# Patient Record
Sex: MALE | Race: WHITE | NOT HISPANIC OR LATINO | Employment: OTHER | URBAN - METROPOLITAN AREA
[De-identification: names, ages, dates, MRNs, and addresses within clinical notes are randomized per-mention and may not be internally consistent; named-entity substitution may affect disease eponyms.]

---

## 2017-11-08 ENCOUNTER — OFFICE VISIT (OUTPATIENT)
Dept: LAB | Facility: HOSPITAL | Age: 67
End: 2017-11-08
Attending: INTERNAL MEDICINE
Payer: MEDICARE

## 2017-11-08 ENCOUNTER — TRANSCRIBE ORDERS (OUTPATIENT)
Dept: LAB | Facility: HOSPITAL | Age: 67
End: 2017-11-08

## 2017-11-08 DIAGNOSIS — I49.40 CARDIAC ARRHYTHMIA DUE TO PREMATURE DEPOLARIZATION, UNSPECIFIED TYPE: ICD-10-CM

## 2017-11-08 DIAGNOSIS — I49.40 CARDIAC ARRHYTHMIA DUE TO PREMATURE DEPOLARIZATION, UNSPECIFIED TYPE: Primary | ICD-10-CM

## 2017-11-08 LAB
ATRIAL RATE: 75 BPM
P AXIS: 48 DEGREES
PR INTERVAL: 180 MS
QRS AXIS: -67 DEGREES
QRSD INTERVAL: 90 MS
QT INTERVAL: 362 MS
QTC INTERVAL: 404 MS
T WAVE AXIS: 65 DEGREES
VENTRICULAR RATE: 75 BPM

## 2017-11-08 PROCEDURE — 93005 ELECTROCARDIOGRAM TRACING: CPT

## 2017-11-15 LAB — HBA1C MFR BLD HPLC: 5.6 %

## 2018-07-24 ENCOUNTER — TELEPHONE (OUTPATIENT)
Dept: FAMILY MEDICINE CLINIC | Facility: CLINIC | Age: 68
End: 2018-07-24

## 2018-07-24 ENCOUNTER — OFFICE VISIT (OUTPATIENT)
Dept: FAMILY MEDICINE CLINIC | Facility: CLINIC | Age: 68
End: 2018-07-24
Payer: COMMERCIAL

## 2018-07-24 VITALS
TEMPERATURE: 99.7 F | BODY MASS INDEX: 30.06 KG/M2 | WEIGHT: 210 LBS | SYSTOLIC BLOOD PRESSURE: 140 MMHG | HEIGHT: 70 IN | HEART RATE: 64 BPM | RESPIRATION RATE: 16 BRPM | DIASTOLIC BLOOD PRESSURE: 90 MMHG

## 2018-07-24 DIAGNOSIS — R03.0 BORDERLINE HYPERTENSION: ICD-10-CM

## 2018-07-24 DIAGNOSIS — G43.709 CHRONIC MIGRAINE WITHOUT AURA WITHOUT STATUS MIGRAINOSUS, NOT INTRACTABLE: Primary | ICD-10-CM

## 2018-07-24 DIAGNOSIS — K21.9 GASTROESOPHAGEAL REFLUX DISEASE WITHOUT ESOPHAGITIS: ICD-10-CM

## 2018-07-24 DIAGNOSIS — Z13.6 SCREENING FOR CARDIOVASCULAR CONDITION: ICD-10-CM

## 2018-07-24 PROCEDURE — 99204 OFFICE O/P NEW MOD 45 MIN: CPT | Performed by: FAMILY MEDICINE

## 2018-07-24 PROCEDURE — 3008F BODY MASS INDEX DOCD: CPT | Performed by: FAMILY MEDICINE

## 2018-07-24 RX ORDER — RANITIDINE 150 MG/1
150 TABLET ORAL 2 TIMES DAILY
COMMUNITY
End: 2021-06-14 | Stop reason: ALTCHOICE

## 2018-07-24 RX ORDER — RANITIDINE 150 MG/1
150 CAPSULE ORAL 2 TIMES DAILY
Refills: 0 | Status: CANCELLED | OUTPATIENT
Start: 2018-07-24

## 2018-07-24 RX ORDER — CALCIUM CARBONATE 300MG(750)
1 TABLET,CHEWABLE ORAL DAILY
Qty: 30 TABLET | Refills: 5
Start: 2018-07-24

## 2018-07-24 RX ORDER — SUMATRIPTAN 50 MG/1
50 TABLET, FILM COATED ORAL ONCE AS NEEDED
COMMUNITY
End: 2018-07-24 | Stop reason: SDUPTHER

## 2018-07-24 RX ORDER — COVID-19 ANTIGEN TEST
KIT MISCELLANEOUS
Refills: 0 | Status: CANCELLED | OUTPATIENT
Start: 2018-07-24

## 2018-07-24 RX ORDER — SUMATRIPTAN 50 MG/1
50 TABLET, FILM COATED ORAL ONCE AS NEEDED
Qty: 9 TABLET | Refills: 0 | Status: CANCELLED | OUTPATIENT
Start: 2018-07-24

## 2018-07-24 RX ORDER — COVID-19 ANTIGEN TEST
KIT MISCELLANEOUS
COMMUNITY

## 2018-07-24 RX ORDER — SUMATRIPTAN 50 MG/1
50 TABLET, FILM COATED ORAL ONCE AS NEEDED
Qty: 9 TABLET | Refills: 1 | Status: SHIPPED | OUTPATIENT
Start: 2018-07-24 | End: 2018-08-20 | Stop reason: SDUPTHER

## 2018-07-24 NOTE — PROGRESS NOTES
Chief Complaint   Patient presents with    New Patient     New patient here to establish care and discuss migraines  Assessment/Plan:    Chronic migraine without aura or status migrainosus  Patient has chronic frequent migraine headaches  Discuss side effects of Imitrex in detail  Explained to patient that I do not recommend frequent use of medication due to increased risk of cardiovascular events  Recommended to start Magnesium 400 mg 1 tablet daily for migraine prophylaxis  Encouraged patient to stay well hydrated, avoid migraine triggers  Schedule appointment with neurologist to discussed treatment options for migraine headaches, Botox injections  Gastroesophageal reflux disease without esophagitis  Symptoms are stable  Take Ranitidine 150 mg one tablet twice daily pRN  Avoid fried, fatty food  Decrease caffeine and alcohol consumption  Borderline hypertension  Blood pressure is elevated in the office today  Recommended to follow a low-sodium diet  Encouraged regular exercise, weight reduction  Will recheck blood pressure in 4 weeks  Check labs  Schedule follow-up visit in 4 weeks, will review test results  Diagnoses and all orders for this visit:    Chronic migraine without aura without status migrainosus, not intractable  -     CBC and differential; Future  -     Comprehensive metabolic panel; Future  -     TSH, 3rd generation with T4 reflex; Future  -     SUMAtriptan (IMITREX) 50 mg tablet; Take 1 tablet (50 mg total) by mouth once as needed for migraine  -     Ambulatory referral to Neurology; Future  -     Magnesium 400 MG TABS; Take 1 tablet (400 mg total) by mouth daily    Borderline hypertension  -     CBC and differential; Future  -     Comprehensive metabolic panel; Future    Screening for cardiovascular condition  -     Lipid panel;  Future    Gastroesophageal reflux disease without esophagitis    Other orders  -     Cancel: SUMAtriptan (IMITREX) 50 mg tablet; Take 1 tablet (50 mg total) by mouth once as needed for migraine for up to 1 dose  -     Cancel: ranitidine (ZANTAC) 150 MG capsule; Take 1 capsule (150 mg total) by mouth 2 (two) times a day  -     Cancel: Naproxen Sodium (ALEVE) 220 MG CAPS; Take by mouth  -     Discontinue: SUMAtriptan (IMITREX) 50 mg tablet; Take 50 mg by mouth once as needed for migraine  -     ranitidine (ZANTAC) 150 mg tablet; Take 150 mg by mouth 2 (two) times a day  -     Naproxen Sodium (ALEVE) 220 MG CAPS; Take by mouth          Subjective:      Patient ID: Hammad Cobos is a 74 y o  male  HPI     New patient presents to [facility] medical care  Patient has H/o migraine headaches for many years  He reports having headaches 3-4 times per week  The only medication which helps to relieve the headache is Imitrex 50 mg  Patient states that he tried in the past Amitriptyline, Topamax with no relief in symptoms  He has not been seen by neurologist previously  Headaches are not associated with nausea or vomiting  C/o light sensitivity, wears sunglasses  Patient has H/o GERD  He takes Ranitidine 150 mg 1 tablet twice daily PRN  Patient is a former smoker  He drinks whiskey 2-3 drinks daily  No illicit drug use  No recent blood test     The following portions of the patient's history were reviewed and updated as appropriate: allergies, past family history, past medical history, past social history, past surgical history and problem list     Review of Systems   Constitutional: Negative for activity change, appetite change, chills, fatigue and fever  HENT: Negative for congestion, ear pain, hearing loss, nosebleeds, sore throat, tinnitus and trouble swallowing  Eyes: Negative for pain, discharge, redness, itching and visual disturbance  Respiratory: Negative for cough, chest tightness, shortness of breath and wheezing  Cardiovascular: Negative for chest pain, palpitations and leg swelling     Gastrointestinal: Negative for abdominal pain, blood in stool, constipation, diarrhea, nausea and vomiting  Denies heartburn   Genitourinary: Negative for difficulty urinating, dysuria and hematuria  Nocturia   Musculoskeletal: Negative for arthralgias, gait problem, joint swelling and myalgias  Skin: Negative for rash and wound  Neurological: Positive for headaches  Negative for dizziness, seizures and syncope  Hematological: Negative  Psychiatric/Behavioral: Negative  Objective:      /90 (BP Location: Left arm, Patient Position: Sitting, Cuff Size: Large)   Pulse 64   Temp 99 7 °F (37 6 °C) (Tympanic)   Resp 16   Ht 5' 10 25" (1 784 m)   Wt 95 3 kg (210 lb)   BMI 29 92 kg/m²          Physical Exam   Constitutional: He appears well-developed and well-nourished  Obese   HENT:   Head: Normocephalic and atraumatic  Right Ear: External ear normal    Left Ear: External ear normal    Mouth/Throat: Oropharynx is clear and moist    Eyes: Conjunctivae are normal  Pupils are equal, round, and reactive to light  Neck: Normal range of motion  Neck supple  No JVD present  Cardiovascular: Normal rate, regular rhythm and normal heart sounds  No murmur heard  No carotid bruits BL  No abdominal bruits  No BL LE edema   Pulmonary/Chest: Effort normal and breath sounds normal  He has no wheezes  He has no rales  Abdominal: Soft  Bowel sounds are normal  There is no tenderness  Musculoskeletal: Normal range of motion  He exhibits no edema, tenderness or deformity  Neurological: He is alert  No cranial nerve deficit  Coordination normal    Skin: Skin is warm and dry  No rash noted  Psychiatric: He has a normal mood and affect  Nursing note and vitals reviewed

## 2018-07-24 NOTE — ASSESSMENT & PLAN NOTE
Patient has chronic frequent migraine headaches  Discuss side effects of Imitrex in detail  Explained to patient that I do not recommend frequent use of medication due to increased risk of cardiovascular events  Recommended to start Magnesium 400 mg 1 tablet daily for migraine prophylaxis  Encouraged patient to stay well hydrated, avoid migraine triggers  Schedule appointment with neurologist to discussed treatment options for migraine headaches, Botox injections

## 2018-07-24 NOTE — ASSESSMENT & PLAN NOTE
Symptoms are stable  Take Ranitidine 150 mg one tablet twice daily pRN  Avoid fried, fatty food  Decrease caffeine and alcohol consumption

## 2018-07-24 NOTE — ASSESSMENT & PLAN NOTE
Blood pressure is elevated in the office today  Recommended to follow a low-sodium diet  Encouraged regular exercise, weight reduction  Will recheck blood pressure in 4 weeks

## 2018-08-20 DIAGNOSIS — G43.709 CHRONIC MIGRAINE WITHOUT AURA WITHOUT STATUS MIGRAINOSUS, NOT INTRACTABLE: ICD-10-CM

## 2018-08-20 RX ORDER — SUMATRIPTAN 50 MG/1
50 TABLET, FILM COATED ORAL ONCE AS NEEDED
Qty: 9 TABLET | Refills: 0 | Status: SHIPPED | OUTPATIENT
Start: 2018-08-20 | End: 2018-09-10 | Stop reason: SDUPTHER

## 2018-08-24 ENCOUNTER — APPOINTMENT (OUTPATIENT)
Dept: LAB | Facility: HOSPITAL | Age: 68
End: 2018-08-24
Payer: COMMERCIAL

## 2018-08-24 DIAGNOSIS — R03.0 BORDERLINE HYPERTENSION: ICD-10-CM

## 2018-08-24 DIAGNOSIS — Z13.6 SCREENING FOR CARDIOVASCULAR CONDITION: ICD-10-CM

## 2018-08-24 DIAGNOSIS — G43.709 CHRONIC MIGRAINE WITHOUT AURA WITHOUT STATUS MIGRAINOSUS, NOT INTRACTABLE: ICD-10-CM

## 2018-08-24 LAB
ALBUMIN SERPL BCP-MCNC: 3.5 G/DL (ref 3.5–5)
ALP SERPL-CCNC: 66 U/L (ref 46–116)
ALT SERPL W P-5'-P-CCNC: 51 U/L (ref 12–78)
ANION GAP SERPL CALCULATED.3IONS-SCNC: 5 MMOL/L (ref 4–13)
AST SERPL W P-5'-P-CCNC: 48 U/L (ref 5–45)
BASOPHILS # BLD AUTO: 0.08 THOUSANDS/ΜL (ref 0–0.1)
BASOPHILS NFR BLD AUTO: 1 % (ref 0–1)
BILIRUB SERPL-MCNC: 1.1 MG/DL (ref 0.2–1)
BUN SERPL-MCNC: 18 MG/DL (ref 5–25)
CALCIUM SERPL-MCNC: 8.3 MG/DL (ref 8.3–10.1)
CHLORIDE SERPL-SCNC: 105 MMOL/L (ref 100–108)
CHOLEST SERPL-MCNC: 185 MG/DL (ref 50–200)
CO2 SERPL-SCNC: 26 MMOL/L (ref 21–32)
CREAT SERPL-MCNC: 1.2 MG/DL (ref 0.6–1.3)
EOSINOPHIL # BLD AUTO: 0.06 THOUSAND/ΜL (ref 0–0.61)
EOSINOPHIL NFR BLD AUTO: 1 % (ref 0–6)
ERYTHROCYTE [DISTWIDTH] IN BLOOD BY AUTOMATED COUNT: 12.4 % (ref 11.6–15.1)
GFR SERPL CREATININE-BSD FRML MDRD: 63 ML/MIN/1.73SQ M
GLUCOSE P FAST SERPL-MCNC: 94 MG/DL (ref 65–99)
HCT VFR BLD AUTO: 45.4 % (ref 36.5–49.3)
HDLC SERPL-MCNC: 51 MG/DL (ref 40–60)
HGB BLD-MCNC: 15.9 G/DL (ref 12–17)
IMM GRANULOCYTES # BLD AUTO: 0.01 THOUSAND/UL (ref 0–0.2)
IMM GRANULOCYTES NFR BLD AUTO: 0 % (ref 0–2)
LDLC SERPL CALC-MCNC: 104 MG/DL (ref 0–100)
LYMPHOCYTES # BLD AUTO: 2.21 THOUSANDS/ΜL (ref 0.6–4.47)
LYMPHOCYTES NFR BLD AUTO: 35 % (ref 14–44)
MCH RBC QN AUTO: 32.3 PG (ref 26.8–34.3)
MCHC RBC AUTO-ENTMCNC: 35 G/DL (ref 31.4–37.4)
MCV RBC AUTO: 92 FL (ref 82–98)
MONOCYTES # BLD AUTO: 0.41 THOUSAND/ΜL (ref 0.17–1.22)
MONOCYTES NFR BLD AUTO: 6 % (ref 4–12)
NEUTROPHILS # BLD AUTO: 3.6 THOUSANDS/ΜL (ref 1.85–7.62)
NEUTS SEG NFR BLD AUTO: 57 % (ref 43–75)
NONHDLC SERPL-MCNC: 134 MG/DL
NRBC BLD AUTO-RTO: 0 /100 WBCS
PLATELET # BLD AUTO: 166 THOUSANDS/UL (ref 149–390)
PMV BLD AUTO: 10.4 FL (ref 8.9–12.7)
POTASSIUM SERPL-SCNC: 4.2 MMOL/L (ref 3.5–5.3)
PROT SERPL-MCNC: 6.9 G/DL (ref 6.4–8.2)
RBC # BLD AUTO: 4.92 MILLION/UL (ref 3.88–5.62)
SODIUM SERPL-SCNC: 136 MMOL/L (ref 136–145)
TRIGL SERPL-MCNC: 148 MG/DL
TSH SERPL DL<=0.05 MIU/L-ACNC: 2.68 UIU/ML (ref 0.36–3.74)
WBC # BLD AUTO: 6.37 THOUSAND/UL (ref 4.31–10.16)

## 2018-08-24 PROCEDURE — 80053 COMPREHEN METABOLIC PANEL: CPT

## 2018-08-24 PROCEDURE — 85025 COMPLETE CBC W/AUTO DIFF WBC: CPT

## 2018-08-24 PROCEDURE — 84443 ASSAY THYROID STIM HORMONE: CPT

## 2018-08-24 PROCEDURE — 36415 COLL VENOUS BLD VENIPUNCTURE: CPT

## 2018-08-24 PROCEDURE — 80061 LIPID PANEL: CPT

## 2018-08-30 ENCOUNTER — OFFICE VISIT (OUTPATIENT)
Dept: FAMILY MEDICINE CLINIC | Facility: CLINIC | Age: 68
End: 2018-08-30
Payer: COMMERCIAL

## 2018-08-30 VITALS
BODY MASS INDEX: 30.17 KG/M2 | WEIGHT: 211.8 LBS | SYSTOLIC BLOOD PRESSURE: 154 MMHG | HEART RATE: 94 BPM | RESPIRATION RATE: 16 BRPM | TEMPERATURE: 99.3 F | DIASTOLIC BLOOD PRESSURE: 94 MMHG

## 2018-08-30 DIAGNOSIS — I10 ESSENTIAL HYPERTENSION: Primary | ICD-10-CM

## 2018-08-30 DIAGNOSIS — R00.0 SINUS TACHYCARDIA: ICD-10-CM

## 2018-08-30 DIAGNOSIS — G43.709 CHRONIC MIGRAINE WITHOUT AURA WITHOUT STATUS MIGRAINOSUS, NOT INTRACTABLE: ICD-10-CM

## 2018-08-30 DIAGNOSIS — Z12.11 SCREENING FOR COLON CANCER: ICD-10-CM

## 2018-08-30 PROBLEM — R74.01 ELEVATED AST (SGOT): Status: ACTIVE | Noted: 2018-08-30

## 2018-08-30 PROCEDURE — 99214 OFFICE O/P EST MOD 30 MIN: CPT | Performed by: FAMILY MEDICINE

## 2018-08-30 PROCEDURE — 93000 ELECTROCARDIOGRAM COMPLETE: CPT | Performed by: FAMILY MEDICINE

## 2018-08-30 RX ORDER — METOPROLOL SUCCINATE 25 MG/1
25 TABLET, EXTENDED RELEASE ORAL DAILY
Qty: 30 TABLET | Refills: 5 | Status: SHIPPED | OUTPATIENT
Start: 2018-08-30 | End: 2018-09-26 | Stop reason: ALTCHOICE

## 2018-08-30 NOTE — ASSESSMENT & PLAN NOTE
Start Magnesium 400 mg one tablet daily for migraine prophylaxis  Recommended to stay well hydrated, avoid migraine triggers  Schedule appointment with neurology for chronic migraine headaches as discussed at the last visit

## 2018-08-30 NOTE — ASSESSMENT & PLAN NOTE
Patient has elevated blood pressure, tachycardia  EKG showed sinus rhythm, HR 98 beats per minute, QRS abnormality in leads 3, rule out old infarct  No acute ST- T wave changes  No significant EKG changes from previous EKG from 11/17  Will start Metoprolol ER 25 mg 1 tablet daily  Recommended to follow a low-sodium diet  Encouraged regular exercise

## 2018-08-30 NOTE — PROGRESS NOTES
Chief Complaint   Patient presents with    Follow-up     Follow up to review labs  Health Maintenance   Topic Date Due    Depression Screening PHQ  1950    Medicare Annual Wellness Visit (AWV)  1950    CRC Screening: Colonoscopy  1950    DTaP,Tdap,and Td Vaccines (1 - Tdap) 09/08/1973    Fall Risk  09/08/2017    Pneumococcal PPSV23/PCV13 65+ Years / Low and Medium Risk (1 of 2 - PCV13) 09/08/2017    INFLUENZA VACCINE  09/01/2018     Assessment/Plan:    Essential hypertension  Patient has elevated blood pressure, tachycardia  EKG showed sinus rhythm, HR 98 beats per minute, QRS abnormality in leads 3, rule out old infarct  No acute ST- T wave changes  No significant EKG changes from previous EKG from 11/17  Will start Metoprolol ER 25 mg 1 tablet daily  Recommended to follow a low-sodium diet  Encouraged regular exercise  Chronic migraine without aura or status migrainosus  Start Magnesium 400 mg one tablet daily for migraine prophylaxis  Recommended to stay well hydrated, avoid migraine triggers  Schedule appointment with neurology for chronic migraine headaches as discussed at the last visit  Sinus tachycardia  Recommended to decrease caffeine intake  Will start beta-blocker  Elevated AST (SGOT)  AST level is mildly elevated  Patient was advised to decrease alcohol consumption  Schedule follow-up visit in 2 weeks for HTN, sinus tachycardia  Diagnoses and all orders for this visit:    Essential hypertension  -     POCT ECG  -     metoprolol succinate (TOPROL-XL) 25 mg 24 hr tablet; Take 1 tablet (25 mg total) by mouth daily    Sinus tachycardia  -     POCT ECG  -     metoprolol succinate (TOPROL-XL) 25 mg 24 hr tablet; Take 1 tablet (25 mg total) by mouth daily    Chronic migraine without aura without status migrainosus, not intractable    Screening for colon cancer  -     Ambulatory referral to Gastroenterology;  Future          Subjective: Patient ID: Sophie Damon is a 74 y o  male  HPI     Patient presents for 4 week follow-up office visit,  review test results  He was seen for initial evaluation on July 24, 2018  His blood pressure was elevated at 140/90  Patient states that he was taking blood pressure medication in the past, but he does not remember the name of the medicine  Denies chest pain, shortness of breath, dizziness, heart palpitations  Patient had blood test done on 8/24/18  Cholesterol 185, triglycerides 148, HDL 51, , CBC with dif - within normal limits  Potassium 4 2, creatinine 1 20, AST 48, ALT 51,   TSH 2 680  Patient has chronic frequent migraine headaches  He reports no headache for the past week  Usually he gets migraine headache 3-4 times per week  He takes Imitrex  PRN  Patient tried Amitriptyline, Topamax in the past with no improvement in symptoms  He states that he had adverse reaction to Amitriptyline  Patient did not start taking Magnesium 400 mg daily as discussed at the last visit  Patient did not schedule appointment with neurology yet  Former smoker  Patient drinks whiskey 2-3 drinks daily  Denies using illicit drugs  The following portions of the patient's history were reviewed and updated as appropriate: allergies, current medications, past medical history, past social history, past surgical history and problem list     Review of Systems   Constitutional: Negative for activity change, chills, fatigue and fever  HENT: Negative  Eyes: Negative for pain, discharge, redness, itching and visual disturbance  Respiratory: Negative for cough, chest tightness, shortness of breath and wheezing  Cardiovascular: Negative for chest pain, palpitations and leg swelling  Gastrointestinal: Negative for abdominal pain, blood in stool, constipation, diarrhea, nausea and vomiting     Genitourinary: Negative for difficulty urinating, dysuria, flank pain, frequency and hematuria  Musculoskeletal: Negative for arthralgias, gait problem, joint swelling and myalgias  Skin: Negative for rash and wound  Neurological: Negative for dizziness, seizures, syncope and headaches  Hematological: Negative  Psychiatric/Behavioral: Negative  Objective:      /94 (BP Location: Left arm, Patient Position: Sitting, Cuff Size: Large)   Pulse 94   Temp 99 3 °F (37 4 °C) (Tympanic)   Resp 16   Wt 96 1 kg (211 lb 12 8 oz)   BMI 30 17 kg/m²          Physical Exam   Constitutional: He appears well-developed and well-nourished  Mildly obese   HENT:   Head: Normocephalic and atraumatic  Right Ear: External ear normal    Left Ear: External ear normal    Mouth/Throat: Oropharynx is clear and moist    Eyes: Conjunctivae are normal  Pupils are equal, round, and reactive to light  Cardiovascular: Normal rate and regular rhythm  Exam reveals no gallop  No murmur heard  Sinus tachycardia  No carotid bruits BL  No BL LE edema   Pulmonary/Chest: Effort normal and breath sounds normal  He has no wheezes  He has no rales  Abdominal: Soft  Bowel sounds are normal  There is no rebound  Musculoskeletal: Normal range of motion  He exhibits no edema, tenderness or deformity  Patient ambulates with a cane   Skin: Skin is warm and dry  Psychiatric: He has a normal mood and affect  Nursing note and vitals reviewed

## 2018-09-10 DIAGNOSIS — G43.709 CHRONIC MIGRAINE WITHOUT AURA WITHOUT STATUS MIGRAINOSUS, NOT INTRACTABLE: ICD-10-CM

## 2018-09-10 RX ORDER — SUMATRIPTAN 50 MG/1
50 TABLET, FILM COATED ORAL ONCE AS NEEDED
Qty: 9 TABLET | Refills: 0 | Status: SHIPPED | OUTPATIENT
Start: 2018-09-10 | End: 2018-09-26 | Stop reason: SDUPTHER

## 2018-09-26 ENCOUNTER — OFFICE VISIT (OUTPATIENT)
Dept: FAMILY MEDICINE CLINIC | Facility: CLINIC | Age: 68
End: 2018-09-26
Payer: COMMERCIAL

## 2018-09-26 ENCOUNTER — TELEPHONE (OUTPATIENT)
Dept: FAMILY MEDICINE CLINIC | Facility: CLINIC | Age: 68
End: 2018-09-26

## 2018-09-26 VITALS
HEART RATE: 84 BPM | OXYGEN SATURATION: 95 % | BODY MASS INDEX: 29.78 KG/M2 | SYSTOLIC BLOOD PRESSURE: 144 MMHG | TEMPERATURE: 99.2 F | RESPIRATION RATE: 16 BRPM | DIASTOLIC BLOOD PRESSURE: 90 MMHG | WEIGHT: 209 LBS

## 2018-09-26 DIAGNOSIS — G43.709 CHRONIC MIGRAINE WITHOUT AURA WITHOUT STATUS MIGRAINOSUS, NOT INTRACTABLE: ICD-10-CM

## 2018-09-26 DIAGNOSIS — I10 ESSENTIAL HYPERTENSION: Primary | ICD-10-CM

## 2018-09-26 DIAGNOSIS — R00.0 SINUS TACHYCARDIA: ICD-10-CM

## 2018-09-26 DIAGNOSIS — Z12.11 SCREENING FOR COLON CANCER: ICD-10-CM

## 2018-09-26 DIAGNOSIS — R94.31 ABNORMAL EKG: ICD-10-CM

## 2018-09-26 DIAGNOSIS — K21.9 GASTROESOPHAGEAL REFLUX DISEASE WITHOUT ESOPHAGITIS: ICD-10-CM

## 2018-09-26 PROCEDURE — 99214 OFFICE O/P EST MOD 30 MIN: CPT | Performed by: FAMILY MEDICINE

## 2018-09-26 RX ORDER — SUMATRIPTAN 50 MG/1
50 TABLET, FILM COATED ORAL ONCE AS NEEDED
Qty: 9 TABLET | Refills: 1 | Status: SHIPPED | OUTPATIENT
Start: 2018-09-26 | End: 2018-10-15 | Stop reason: SDUPTHER

## 2018-09-26 NOTE — PROGRESS NOTES
Chief Complaint   Patient presents with    Follow-up     2 week follow up for Hypertension  Health Maintenance   Topic Date Due    Depression Screening PHQ  1950    Medicare Annual Wellness Visit (AWV)  1950    CRC Screening: Colonoscopy  1950    DTaP,Tdap,and Td Vaccines (1 - Tdap) 09/08/1973    Fall Risk  09/08/2017    Pneumococcal PPSV23/PCV13 65+ Years / Low and Medium Risk (1 of 2 - PCV13) 09/08/2017    INFLUENZA VACCINE  09/01/2018     Assessment/Plan:    HTN / Sinus tachycardia - patient stop taking Metoprolol ER 25 mg due to side effects, felt tired and weak  Blood pressure remains elevated  His heart rate is irregular  Patient denies heart palpitations, dizziness, chest pain  Recommended to follow low-sodium diet, decrease caffeine and alcohol intake  Stay well hydrated  Check BP at [employer] and keep BP log  Recommended cardiology evaluation  Chronic migraine without aura or status migrainosus  Explained to patient that he should not take Imitrex every 2-3 days due to increased risk of cardiovascular events  Will refill prescription only for 9 pills per month  Recommended neurology consultation  Appointment scheduled with neurology on 11/2/18  Gastroesophageal reflux disease without esophagitis  Symptoms are stable  Take Ranitidine 150 mg 1 tablet twice daily PRN  HM: patient declined flu shot today  Recommended to schedule colonoscopy  I have spent 25 minutes with Patient  today in which greater than 50% of this time was spent in counseling/coordination of care regarding Risks and benefits of tx options, Intructions for management, Importance of tx compliance and Risk factor reductions  Schedule follow-up visit in 4 months  Diagnoses and all orders for this visit:    Essential hypertension  -     Ambulatory referral to Cardiology; Future    Sinus tachycardia  -     Ambulatory referral to Cardiology;  Future    Chronic migraine without aura without status migrainosus, not intractable  -     Ambulatory referral to Neurology; Future  -     SUMAtriptan (IMITREX) 50 mg tablet; Take 1 tablet (50 mg total) by mouth once as needed for migraine    Screening for colon cancer  -     Ambulatory referral to Gastroenterology; Future    Abnormal EKG  -     Ambulatory referral to Cardiology; Future    Gastroesophageal reflux disease without esophagitis          Subjective:      Patient ID: Abbey Capone is a 79 y o  male  HPI     Patient presents for 3 week follow-up visit for HTN, sinus tachycardia  He was started on Metoprolol ER 25 mg daily at the last visit on 8/30/18  Patient states that he took medication for 1 week and then stopped due to feeling very tired and weak  Patient denies chest pain, shortness of breath, heart palpitations  EKG done last month showed sinus rhythm, 98 bpm,  QRS abnormality in leads 3, rule out old infarct  No acute ST- T wave changes  Patient has chronic frequent migraine headaches  He takes Imitrex 50 mg every 2-3 days  Patient states that he needs 18 pills per month  He tried Topamax, Amitriptyline in the past with no improvement in symptoms  Patient started taking Magnesium 400 mg daily  Patient did not schedule appointment with neurology as discussed at the previous visit  GERD - symptoms are stable  Patient takes Zantac 150 mg twice daily PRN for heartburn  Denies abdominal pain  Patient is a former smoker, stopped smoking 14 years ago  He drinks whiskey 2-3 drinks daily  Denies using illicit drugs  The following portions of the patient's history were reviewed and updated as appropriate: allergies, past family history, past medical history, past social history, past surgical history and problem list     Review of Systems   Constitutional: Positive for fatigue  Negative for activity change, appetite change, chills and fever  Diaphoresis: mild     HENT: Negative for congestion, hearing loss, sinus pressure, sore throat, tinnitus and trouble swallowing  Eyes: Positive for photophobia  Negative for pain, discharge, redness, itching and visual disturbance  Respiratory: Negative for cough, chest tightness, shortness of breath and wheezing  Cardiovascular: Negative for chest pain, palpitations and leg swelling  Gastrointestinal: Negative  Musculoskeletal: Positive for gait problem (ambulates with a cane)  Negative for arthralgias and joint swelling  Skin: Negative for rash and wound  Neurological: Positive for headaches  Negative for dizziness, seizures and syncope  Light-headedness: chronic migraine headache  Hematological: Negative  Objective:      /90 (BP Location: Left arm, Patient Position: Sitting, Cuff Size: Standard)   Pulse 84   Temp 99 2 °F (37 3 °C) (Tympanic)   Resp 16   Wt 94 8 kg (209 lb)   SpO2 95%   BMI 29 78 kg/m²        Physical Exam   Constitutional: He appears well-developed and well-nourished  HENT:   Head: Normocephalic and atraumatic  Right Ear: External ear normal    Left Ear: External ear normal    Mouth/Throat: Oropharynx is clear and moist    Eyes: Conjunctivae are normal  Pupils are equal, round, and reactive to light  Cardiovascular: Normal rate and normal heart sounds  No murmur heard  Irregular heart rate  No BL LE edema   Pulmonary/Chest: Effort normal and breath sounds normal  He has no wheezes  He has no rales  Abdominal: Soft  Bowel sounds are normal  There is no tenderness  Musculoskeletal: He exhibits no edema, tenderness or deformity  Patient ambulates with a cane   Skin: Skin is warm and dry  No rash noted  Psychiatric: He has a normal mood and affect  Nursing note and vitals reviewed

## 2018-09-26 NOTE — ASSESSMENT & PLAN NOTE
Explained to patient that he should not take Imitrex every 2-3 days due to increased risk of cardiovascular events  Will refill prescription only for 9 pills per month  Recommended neurology consultation  Appointment scheduled with neurology on 11/2/18

## 2018-09-26 NOTE — TELEPHONE ENCOUNTER
FYI:   Mailed an [facility] referral to Gastroenterology along with a Business Card for [employer] for Naz Castrejon, along with instructions to [place]

## 2018-10-10 ENCOUNTER — PATIENT OUTREACH (OUTPATIENT)
Dept: FAMILY MEDICINE CLINIC | Facility: CLINIC | Age: 68
End: 2018-10-10

## 2018-10-10 NOTE — PROGRESS NOTES
Spoke with patient in regards to his upcoming Neurology appointment on 11/2/18  He does not have a cell phone so I will arrange for taxi to take him to Neurology appointment  He is fine with this plan  I will call back day before appointment to confirm everything  He will call me if appointment changes  I will also mail him the Yasmine [facility] application  He uses [employer] bus now to get around  He has my contact information

## 2018-10-15 DIAGNOSIS — G43.709 CHRONIC MIGRAINE WITHOUT AURA WITHOUT STATUS MIGRAINOSUS, NOT INTRACTABLE: ICD-10-CM

## 2018-10-15 RX ORDER — SUMATRIPTAN 50 MG/1
50 TABLET, FILM COATED ORAL ONCE AS NEEDED
Qty: 9 TABLET | Refills: 0 | Status: SHIPPED | OUTPATIENT
Start: 2018-10-15 | End: 2018-11-02 | Stop reason: SDUPTHER

## 2018-10-15 NOTE — TELEPHONE ENCOUNTER
Patient wants a refill on Generic Imitrex 50 mg to [facility]   Can be reached at [phone]

## 2018-10-16 ENCOUNTER — IMMUNIZATION (OUTPATIENT)
Dept: FAMILY MEDICINE CLINIC | Facility: CLINIC | Age: 68
End: 2018-10-16
Payer: COMMERCIAL

## 2018-10-16 DIAGNOSIS — Z23 ENCOUNTER FOR IMMUNIZATION: ICD-10-CM

## 2018-10-16 PROCEDURE — G0008 ADMIN INFLUENZA VIRUS VAC: HCPCS

## 2018-10-16 PROCEDURE — 90662 IIV NO PRSV INCREASED AG IM: CPT

## 2018-11-02 ENCOUNTER — OFFICE VISIT (OUTPATIENT)
Dept: NEUROLOGY | Facility: CLINIC | Age: 68
End: 2018-11-02
Payer: COMMERCIAL

## 2018-11-02 VITALS — SYSTOLIC BLOOD PRESSURE: 132 MMHG | HEART RATE: 87 BPM | DIASTOLIC BLOOD PRESSURE: 79 MMHG

## 2018-11-02 DIAGNOSIS — G43.709 CHRONIC MIGRAINE WITHOUT AURA WITHOUT STATUS MIGRAINOSUS, NOT INTRACTABLE: ICD-10-CM

## 2018-11-02 DIAGNOSIS — G43.719 INTRACTABLE CHRONIC MIGRAINE WITHOUT AURA AND WITHOUT STATUS MIGRAINOSUS: Primary | ICD-10-CM

## 2018-11-02 PROCEDURE — 96372 THER/PROPH/DIAG INJ SC/IM: CPT | Performed by: PSYCHIATRY & NEUROLOGY

## 2018-11-02 PROCEDURE — 99204 OFFICE O/P NEW MOD 45 MIN: CPT | Performed by: PSYCHIATRY & NEUROLOGY

## 2018-11-02 RX ORDER — SUMATRIPTAN 50 MG/1
TABLET, FILM COATED ORAL
Qty: 9 TABLET | Refills: 0 | Status: SHIPPED | OUTPATIENT
Start: 2018-11-02 | End: 2018-12-03 | Stop reason: SDUPTHER

## 2018-11-02 RX ORDER — KETOROLAC TROMETHAMINE 30 MG/ML
60 INJECTION, SOLUTION INTRAMUSCULAR; INTRAVENOUS ONCE
Status: COMPLETED | OUTPATIENT
Start: 2018-11-02 | End: 2018-11-02

## 2018-11-02 RX ORDER — PROPRANOLOL HYDROCHLORIDE 40 MG/1
TABLET ORAL
Qty: 25 TABLET | Refills: 0 | Status: SHIPPED | OUTPATIENT
Start: 2018-11-02 | End: 2019-01-02 | Stop reason: SDUPTHER

## 2018-11-02 RX ADMIN — KETOROLAC TROMETHAMINE 60 MG: 30 INJECTION, SOLUTION INTRAMUSCULAR; INTRAVENOUS at 13:49

## 2018-11-02 NOTE — PROGRESS NOTES
Patient ID: Ashtyn Sahu is a 79 y o  male  Assessment/Plan:    No problem-specific Assessment & Plan notes found for this encounter  Diagnoses and all orders for this visit:    Intractable chronic migraine without aura and without status migrainosus  -     propranolol (INDERAL) 40 mg tablet; Take half tab nightly x 1 wk, then increase to 1 tab nightly  Call MD in 2 weeks  - Will increase to 60 ER if no s/e with 40 mg dose        - Discussed potential med a/e  - If this does not help or if he has s/e, will try Topamax 25 titrate up to 100 mg as tolerated  - Botox is another option  - Discussed with him 50% reduction in frequency is a good goal   Chronic migraine without aura without status migrainosus, not intractable  -  Abortive:   SUMAtriptan (IMITREX) 50 mg tablet; Take 1 tab at migraine onset, max 2 tabs in 24 hours  Max 2 days a week  - Discussed Toradol PO as an option, gave him Toradol IM in office today, if this helps will send over Toradol PO as rescue instead of triptan use given vascular risk factors  - Discussed with him okay to use triptan if once a week max  Subjective:    HPI     Mr Ewa Victor is a pleasant 78 yo male, hx HTN, tobacco use (stopped ) seen in consultation for migraines  He tells me he has had them since childhood and over time worse but has figured out his triggers thus now better than when younger he would stand out in the sun or work there all day and would have almost daily headaches  States now every other day    States PCP referred him here due to frequent sumatriptan use and EKG abnormal- looking in chart sinus tachycardia  He reports occasional SOB- not necessarily with exertion, denies chest pain or other associated symptoms, not with triptan        Sumatriptan with significant migraine - takes it early on resolved in hour  Duration without sumatriptan: > 4 hours up to a week  No aura  Location: bitemporal vertex  Throbbing pounding aching  A/w photophobia, phonophobia, nausea, used to have emesis but no longer  Severity: 8-10/10  Has a severe headache today in office  Denies other associated neurologic deficits or autonomic symptoms or tinnitus or vertigo or speech changes or significant vision changes    Medications tried and failed: Amitriptyline with excessive fatigue, butalbital not helpful, Depakote not helpful, Topamax but did not try high dose, propranolol- states did not help but probably took low dose as he thought it was BP and stopped it- denies s/e, has tried Advil/Aleve uses this max once a week for arthritis- does not help with headache  Verapamil- unsure if he tried it but states sounds familiar and likely did not help  Triggers: Bright lights, loud noises, skipping meals  Can wake up in middle of night with migraines but this is not new, takes sumatriptan and goes back to sleep  Denies s/e with Sumatriptan- takes 50 mg and rarely needs to repeat    Denies hx head or neck trauma, depression or anxiety  No family hx brain aneurysms or brain bleeds  Family hx migraines in dad and paternal grandmother  Sister with TBI and secondary epilepsy  Denies hx stroke TIA    The following portions of the patient's history were reviewed and updated as appropriate: allergies, current medications, past family history, past medical history, past social history, past surgical history and problem list          Objective:    Blood pressure 132/79, pulse 87  Physical Exam   Constitutional: He appears well-developed and well-nourished  Photophobia, has sunglasses on   HENT:   Head: Normocephalic and atraumatic  Eyes: Pupils are equal, round, and reactive to light  Conjunctivae are normal    Neck: Normal range of motion  Neck supple  Cardiovascular: Normal rate and regular rhythm  Pulmonary/Chest: Effort normal    Musculoskeletal: Normal range of motion  Neurological: He is alert  He has normal strength and normal reflexes   Coordination normal  Nursing note and vitals reviewed  Neurological Exam  Mental Status  Alert  Oriented to person, place, time and situation  Recent and remote memory are intact  no dysarthria present  Language is fluent with no aphasia  Attention and concentration are normal  Fund of knowledge is appropriate for level of education  Cranial Nerves  CN II: Visual fields full to confrontation  Right funduscopic exam: disc intact  Left funduscopic exam: disc intact  CN III, IV, VI: Extraocular movements intact bilaterally  Pupils equal round and reactive to light bilaterally  CN V: Facial sensation is normal   CN VII: Full and symmetric facial movement  CN VIII: Hearing is normal   CN IX, X: Palate elevates symmetrically  Normal gag reflex  CN XI: Shoulder shrug strength is normal   CN XII: Tongue midline without atrophy or fasciculations  Motor   Normal muscle tone  Strength is 5/5 throughout all four extremities  Sensory  Sensation is intact to light touch, pinprick, vibration and proprioception in all four extremities  Light touch is normal in upper and lower extremities  Temperature is normal in upper and lower extremities  Vibration is normal in upper and lower extremities  No right-sided hemispatial neglect  No left-sided hemispatial neglect  Reflexes  Deep tendon reflexes are 2+ and symmetric in all four extremities with downgoing toes bilaterally  Coordination  Finger-to-nose, rapid alternating movements and heel-to-shin normal bilaterally without dysmetria  Gait  Antalgic gait with left knee- walks with cane  ROS:    Review of Systems   Constitutional: Negative  HENT: Negative  Eyes: Negative  Respiratory: Positive for shortness of breath  Cardiovascular: Positive for palpitations  Gastrointestinal: Negative  Endocrine: Negative  Genitourinary: Negative  Musculoskeletal: Negative  Skin: Negative  Allergic/Immunologic: Negative      Neurological: Positive for headaches  Memory problems     Hematological: Negative  Psychiatric/Behavioral: Positive for sleep disturbance (Snoring)

## 2018-11-19 ENCOUNTER — TELEPHONE (OUTPATIENT)
Dept: NEUROLOGY | Facility: CLINIC | Age: 68
End: 2018-11-19

## 2018-11-19 NOTE — TELEPHONE ENCOUNTER
Pt called in to give an update on starting propranolol  States that his blood pressure has gone down so this helps the intensity, but the frequency has not changed  States that he doesn't need to take the sumatriptan as often, but it is uncomfortable not to  Pt states the medication has made him tired, and feels depressed, he feels this is realted to the lower BP and discomfort of the migraines  States he would be hesistant to increase it  According to your note the plan was to try topamax, he he hesitant to do so (reviewed side effects with him), and thinks he would prefer to remain on the propranolol

## 2018-11-20 NOTE — TELEPHONE ENCOUNTER
Is he now taking the half tab or the full tab of propranolol? I can send in Toradol for rescue      Thanks

## 2018-11-21 DIAGNOSIS — G43.719 INTRACTABLE CHRONIC MIGRAINE WITHOUT AURA AND WITHOUT STATUS MIGRAINOSUS: Primary | ICD-10-CM

## 2018-11-21 RX ORDER — KETOROLAC TROMETHAMINE 10 MG/1
TABLET, FILM COATED ORAL
Qty: 15 TABLET | Refills: 0 | Status: SHIPPED | OUTPATIENT
Start: 2018-11-21 | End: 2019-01-28 | Stop reason: ALTCHOICE

## 2018-11-21 NOTE — PROGRESS NOTES
Sent toradol as discussed  Will call and speak with him about Aimovig on Monday, am okay for him to try this

## 2018-11-21 NOTE — TELEPHONE ENCOUNTER
Patient has been taking the full Propanolol tablet for about a week  Agreeable to the Toradol prescription to [facility] [address] in [facility] please  Patient also asking about Aimovig prescription and if he would be appropriate to try that medication  Please advise

## 2018-12-03 DIAGNOSIS — G43.709 CHRONIC MIGRAINE WITHOUT AURA WITHOUT STATUS MIGRAINOSUS, NOT INTRACTABLE: ICD-10-CM

## 2018-12-06 RX ORDER — SUMATRIPTAN 50 MG/1
TABLET, FILM COATED ORAL
Qty: 9 TABLET | Refills: 0 | Status: SHIPPED | OUTPATIENT
Start: 2018-12-06 | End: 2019-01-02 | Stop reason: SDUPTHER

## 2019-01-02 DIAGNOSIS — G43.719 INTRACTABLE CHRONIC MIGRAINE WITHOUT AURA AND WITHOUT STATUS MIGRAINOSUS: ICD-10-CM

## 2019-01-02 DIAGNOSIS — G43.709 CHRONIC MIGRAINE WITHOUT AURA WITHOUT STATUS MIGRAINOSUS, NOT INTRACTABLE: ICD-10-CM

## 2019-01-02 RX ORDER — SUMATRIPTAN 50 MG/1
TABLET, FILM COATED ORAL
Qty: 9 TABLET | Refills: 0 | Status: SHIPPED | OUTPATIENT
Start: 2019-01-02 | End: 2019-02-25 | Stop reason: SDUPTHER

## 2019-01-02 RX ORDER — PROPRANOLOL HYDROCHLORIDE 40 MG/1
TABLET ORAL
Qty: 30 TABLET | Refills: 3 | Status: SHIPPED | OUTPATIENT
Start: 2019-01-02 | End: 2019-07-29 | Stop reason: ALTCHOICE

## 2019-01-02 NOTE — TELEPHONE ENCOUNTER
Patient stated that he thinks the propranolol is helping his migraines a little, they are less frequent   It has lowered his BP

## 2019-01-28 ENCOUNTER — OFFICE VISIT (OUTPATIENT)
Dept: FAMILY MEDICINE CLINIC | Facility: CLINIC | Age: 69
End: 2019-01-28
Payer: COMMERCIAL

## 2019-01-28 VITALS
OXYGEN SATURATION: 96 % | TEMPERATURE: 99.4 F | RESPIRATION RATE: 20 BRPM | SYSTOLIC BLOOD PRESSURE: 120 MMHG | HEART RATE: 100 BPM | BODY MASS INDEX: 31.51 KG/M2 | DIASTOLIC BLOOD PRESSURE: 84 MMHG | WEIGHT: 221.2 LBS

## 2019-01-28 DIAGNOSIS — Z00.00 MEDICARE ANNUAL WELLNESS VISIT, INITIAL: ICD-10-CM

## 2019-01-28 DIAGNOSIS — K21.9 GASTROESOPHAGEAL REFLUX DISEASE WITHOUT ESOPHAGITIS: ICD-10-CM

## 2019-01-28 DIAGNOSIS — I10 ESSENTIAL HYPERTENSION: Primary | ICD-10-CM

## 2019-01-28 DIAGNOSIS — Z12.5 SCREENING FOR PROSTATE CANCER: ICD-10-CM

## 2019-01-28 DIAGNOSIS — Z12.11 SCREENING FOR COLON CANCER: ICD-10-CM

## 2019-01-28 DIAGNOSIS — E66.09 CLASS 1 OBESITY DUE TO EXCESS CALORIES WITHOUT SERIOUS COMORBIDITY WITH BODY MASS INDEX (BMI) OF 31.0 TO 31.9 IN ADULT: ICD-10-CM

## 2019-01-28 DIAGNOSIS — G43.709 CHRONIC MIGRAINE WITHOUT AURA WITHOUT STATUS MIGRAINOSUS, NOT INTRACTABLE: ICD-10-CM

## 2019-01-28 DIAGNOSIS — Z13.6 SCREENING FOR CARDIOVASCULAR CONDITION: ICD-10-CM

## 2019-01-28 PROBLEM — E66.811 CLASS 1 OBESITY DUE TO EXCESS CALORIES WITHOUT SERIOUS COMORBIDITY WITH BODY MASS INDEX (BMI) OF 31.0 TO 31.9 IN ADULT: Status: ACTIVE | Noted: 2019-01-28

## 2019-01-28 PROCEDURE — 1170F FXNL STATUS ASSESSED: CPT | Performed by: FAMILY MEDICINE

## 2019-01-28 PROCEDURE — 1125F AMNT PAIN NOTED PAIN PRSNT: CPT | Performed by: FAMILY MEDICINE

## 2019-01-28 PROCEDURE — 3725F SCREEN DEPRESSION PERFORMED: CPT | Performed by: FAMILY MEDICINE

## 2019-01-28 PROCEDURE — 1036F TOBACCO NON-USER: CPT | Performed by: FAMILY MEDICINE

## 2019-01-28 PROCEDURE — 3079F DIAST BP 80-89 MM HG: CPT | Performed by: FAMILY MEDICINE

## 2019-01-28 PROCEDURE — G0438 PPPS, INITIAL VISIT: HCPCS | Performed by: FAMILY MEDICINE

## 2019-01-28 PROCEDURE — 99214 OFFICE O/P EST MOD 30 MIN: CPT | Performed by: FAMILY MEDICINE

## 2019-01-28 PROCEDURE — 1160F RVW MEDS BY RX/DR IN RCRD: CPT | Performed by: FAMILY MEDICINE

## 2019-01-28 PROCEDURE — 1101F PT FALLS ASSESS-DOCD LE1/YR: CPT | Performed by: FAMILY MEDICINE

## 2019-01-28 PROCEDURE — 3074F SYST BP LT 130 MM HG: CPT | Performed by: FAMILY MEDICINE

## 2019-01-28 NOTE — PROGRESS NOTES
Chief Complaint   Patient presents with    Follow-up     4 month follow up     Health Maintenance   Topic Date Due    Hepatitis C Screening  1950   Georgeana Rouleau Medicare Annual Wellness Visit (AWV)  1950    CRC Screening: Colonoscopy  1950    DTaP,Tdap,and Td Vaccines (1 - Tdap) 09/08/1973    Pneumococcal PPSV23/PCV13 65+ Years / Low and Medium Risk (1 of 2 - PCV13) 09/08/2017    Fall Risk  01/28/2020    Depression Screening PHQ  01/28/2020    INFLUENZA VACCINE  Completed     Assessment/Plan:    No problem-specific Assessment & Plan notes found for this encounter  Diagnoses and all orders for this visit:    Essential hypertension    Chronic migraine without aura without status migrainosus, not intractable  -     Comprehensive metabolic panel; Future    Gastroesophageal reflux disease without esophagitis  -     CBC and differential; Future    Screening for prostate cancer  -     PSA, Total Screen; Future    Screening for cardiovascular condition  -     Lipid panel; Future    Screening for colon cancer  -     Cologuard; Future          Subjective:      Patient ID: Camila Betts is a 79 y o  male  HPI     Patient is 21-year-old male with HTN, chronic migraine headaches, GERD  He presents for 4 month follow-up visit, Medicare initial annual wellness visit  The following portions of the patient's history were reviewed and updated as appropriate: current medications, past family history, past medical history, past social history, past surgical history and problem list     Review of Systems   Gastrointestinal: Negative for bowel incontinence  Psychiatric/Behavioral: The patient is not nervous/anxious            Objective:      /84 (BP Location: Left arm, Patient Position: Sitting, Cuff Size: Large)   Pulse 100   Temp 99 4 °F (37 4 °C) (Tympanic)   Resp 20   Wt 100 kg (221 lb 3 2 oz)   SpO2 96%   BMI 31 51 kg/m²          Physical Exam    Assessment and Plan:    Problem List Items Addressed This Visit        Digestive    Gastroesophageal reflux disease without esophagitis    Relevant Orders    CBC and differential       Cardiovascular and Mediastinum    Chronic migraine without aura or status migrainosus    Relevant Orders    Comprehensive metabolic panel    Essential hypertension - Primary      Other Visit Diagnoses     Screening for prostate cancer        Relevant Orders    PSA, Total Screen    Screening for cardiovascular condition        Relevant Orders    Lipid panel    Screening for colon cancer        Relevant Orders    Cologuard        Health Maintenance Due   Topic Date Due    Hepatitis C Screening  1950    Medicare Annual Wellness Visit (AWV)  1950    CRC Screening: Colonoscopy  1950    DTaP,Tdap,and Td Vaccines (1 - Tdap) 09/08/1973    Pneumococcal PPSV23/PCV13 65+ Years / Low and Medium Risk (1 of 2 - PCV13) 09/08/2017         HPI:  Ann Mayorga is a 79 y o  male here for his Initial Wellness Visit  Patient Active Problem List   Diagnosis    Chronic migraine without aura or status migrainosus    Essential hypertension    Gastroesophageal reflux disease without esophagitis    Sinus tachycardia    Elevated AST (SGOT)    Abnormal EKG     Past Medical History:   Diagnosis Date    Arthritis     Heart murmur 1989    No pluerisy    Hypertension     Migraine      No past surgical history on file    Family History   Problem Relation Age of Onset    Cancer Mother     Migraines Father     Bipolar disorder Brother     Migraines Maternal Grandmother      History   Smoking Status    Former Smoker    Quit date: 2005   Smokeless Tobacco    Never Used     History   Alcohol Use    Yes     Comment: Whiskey and beer      History   Drug Use No       Current Outpatient Prescriptions   Medication Sig Dispense Refill    Magnesium 400 MG TABS Take 1 tablet (400 mg total) by mouth daily 30 tablet 5    Naproxen Sodium (ALEVE) 220 MG CAPS Take by mouth      propranolol (INDERAL) 40 mg tablet 1 tablet at bedtime 30 tablet 3    ranitidine (ZANTAC) 150 mg tablet Take 150 mg by mouth 2 (two) times a day      SUMAtriptan (IMITREX) 50 mg tablet Take 1 tab at migraine onset, max 2 tabs in 24 hours  Max 2 days a week 9 tablet 0     No current facility-administered medications for this visit  No Known Allergies  Immunization History   Administered Date(s) Administered    Influenza, high dose seasonal 0 5 mL 10/16/2018       Patient Care Team:  Braulio Barrientos MD as PCP - General (Family Medicine)    Medicare Screening Tests and Risk Assessments:      Health Risk Assessment:  Patient rates overall health as good  Patient feels that their physical health rating is Slightly better  Eyesight was rated as Slightly worse  Hearing was rated as Same  Patient feels that their emotional and mental health rating is Same  Pain experienced by patient in the last 7 days has been Some  Patient's pain rating has been 2/10  Emotional/Mental Health:  Patient has been feeling nervous/anxious  PHQ-9 Depression Screening:    Frequency of the following problems over the past two weeks:      1  Little interest or pleasure in doing things: 2 - more than half the days      2  Feeling down, depressed, or hopeless: 0 - not at all      3  Trouble falling or staying asleep, or sleeping too much: 0 - not at all      4  Feeling tired or having little energy: 0 - not at all      5  Poor appetite or overeatin - not at all      6  Feeling bad about yourself - or that you are a failure or have let yourself or your family down: 0 - not at all      7  Trouble concentrating on things, such as reading the newspaper or watching television: 0 - not at all      8  Moving or speaking so slowly that other people could have noticed  Or the opposite - being so fidgety or restless that you have been moving around a lot more than usual: 0 - not at all      9   Thoughts that you would be better off dead, or of hurting yourself in some way: 0 - not at all  PHQ-2 Score: 2  PHQ-9 Score: 2    Broken Bones/Falls: Fall Risk Assessment:    In the past year, patient has experienced: No history of falling in past year          Bladder/Bowel:  Patient has not leaked urine accidently in the last six months  Patient reports no loss of bowel control  Immunizations:  Patient has had a flu vaccination within the last year  Patient has received a pneumonia shot  Patient has not received a shingles shot  Patient has not received tetanus/diphtheria shot  Home Safety:  Patient does not have trouble with stairs inside or outside of their home  Patient currently reports that there are no safety hazards present in home, working smoke alarms, working carbon monoxide detectors  Preventative Screenings:   no prostate cancer screen performed, no colon cancer screen completed, cholesterol screen completed, no glaucoma eye exam completed    Nutrition:  Current diet: Regular and No Added Salt with servings of the following:    Medications:  Patient is currently taking over-the-counter supplements  Patient is able to manage medications  Lifestyle Choices:  Patient reports no tobacco use  Patient has smoked or used tobacco in the past   Patient has stopped his tobacco use  Patient reports alcohol use  Patient does not drive a vehicle  Patient wears seat belt  Activities of Daily Living:  Can get out of bed by his or her self, able to dress self, able to make own meals, able to do own shopping, able to bathe self, can do own laundry/housekeeping, can manage own money, pay bills and track expenses    Previous Hospitalizations:  No hospitalization or ED visit in past 12 months        Advanced Directives:  Patient has not decided on power of   Patient has not completed advanced directive          Preventative Screening/Counseling:      Cardiovascular:      General: Risks and Benefits Discussed and Screening Current      Counseling: Healthy Diet, Healthy Weight, Improve Cholesterol, Improve Blood Pressure and Improve Exercise Tolerance          Diabetes:      General: Risks and Benefits Discussed      Counseling: Healthy Diet, Healthy Weight and Improve Physical Activity          Colorectal Cancer:      General: Risks and Benefits Discussed and Patient Declines      Counseling: high fiber diet      Comments: Will order Cologuard test        Prostate Cancer:      General: Risks and Benefits Discussed      Due for labs: PSA          Osteoporosis:      General: Risks and Benefits Discussed and Screening Not Indicated      Counseling: Calcium and Vitamin D Intake and Regular Weightbearing Exercise          AAA:      General: Risks and Benefits Discussed and Screening Not Indicated          Glaucoma:      General: Risks and Benefits Discussed      Comments: Recommended to schedule eye exam with ophthalmologist        HIV:      General: Screening Not Indicated          Hepatitis C:      General: Risks and Benefits Discussed        Advanced Directives:   Patient has no living will for healthcare, does not have durable POA for healthcare, patient does not have an advanced directive  Information on ACP and/or AD provided  5 wishes given  End of life assessment reviewed with patient  Immunizations:      Influenza: Risks & Benefits Discussed and Influenza UTD This Year      Pneumococcal: Risks & Benefits Discussed      Shingrix: Risks & Benefits Discussed      Hepatitis B (Low risk patients): Series Not Indicated      TDAP: Risks & Benefits Discussed      Other Preventative Counseling (Non-Medicare): Fall Prevention, Increase physical activity, Nutrition Counseling, Car/seat belt/driving safety reviewed and Skin self-exam      MMSE: 26/30

## 2019-01-29 NOTE — ASSESSMENT & PLAN NOTE
BP improved since last visit  Patient was started on Propranolol 40 mg by neurology for migraines  Recommended to follow a low sodium diet

## 2019-01-29 NOTE — ASSESSMENT & PLAN NOTE
Patient reports improvement in headaches after starting on Propranolol 40 mg daily  Continue Magnesium 400 mg daily for migraine prophylaxis  Follow-up with neurology

## 2019-01-29 NOTE — PROGRESS NOTES
Assessment/Plan:    Essential hypertension  BP improved since last visit  Patient was started on Propranolol 40 mg by neurology for migraines  Recommended to follow a low sodium diet  Chronic migraine without aura or status migrainosus  Patient reports improvement in headaches after starting on Propranolol 40 mg daily  Continue Magnesium 400 mg daily for migraine prophylaxis  Follow-up with neurology  Gastroesophageal reflux disease without esophagitis  Symptoms are stable  Continue Ranitidine 150 mg twice daily PRN  Class 1 obesity due to excess calories without serious comorbidity with body mass index (BMI) of 31 0 to 31 9 in adult  Encouraged regular exercise, weight reduction  HM: patient refusing colonoscopy  Will order Cologuard test   Recommended Tdap, Shingrix vaccination  Patient will check with insurance regarding coverage  Schedule follow-up visit in 6 months  Check labs prior to next visit  Diagnoses and all orders for this visit:    Essential hypertension    Chronic migraine without aura without status migrainosus, not intractable  -     Comprehensive metabolic panel; Future    Gastroesophageal reflux disease without esophagitis  -     CBC and differential; Future    Screening for prostate cancer  -     PSA, Total Screen; Future    Screening for cardiovascular condition  -     Lipid panel; Future    Screening for colon cancer  -     Cologuard; Future    Medicare annual wellness visit, initial          Subjective:      Patient ID: Wu Mike is a 79 y o  male  HPI     Patient is 79-year-old male with HTN, sinus tachycardia, chronic migraine headaches, GERD  He presents for 4 month follow-up office  Reviewed current medications, last blood test results from 8/18  Patient was seen in 11/18 by neurology Dr Tammie Chung who prescribed Propranolol 40 mg daily for migraine headaches  Patient report improvement in headaches after starting on B-blocker   He tolerates current dose well  HTN - BP improved since last visit  Denies CP, SOB, dizziness  GERD - symptoms are stable  Patient takes Ranitidine 150 mg twice daily PRN  Patient refusing colonoscopy  He does not drive a car  Ambulates wit a cane  Denies falls  Patient is a former smoker  He drinks whiskey 2-3 drinks daily  Flu shot done in 10/18  Patient states that had shot for pneumonia 2 years ago at previous PCP's office  The following portions of the patient's history were reviewed and updated as appropriate: allergies, current medications, past medical history, past social history, past surgical history and problem list     Review of Systems   Constitutional: Positive for fatigue (mid)  Negative for appetite change, chills and fever  HENT: Negative for congestion, ear pain, hearing loss, nosebleeds, sore throat, tinnitus and trouble swallowing  Eyes: Positive for photophobia  Negative for pain, discharge, redness, itching and visual disturbance  Respiratory: Negative for cough, chest tightness, shortness of breath and wheezing  Cardiovascular: Negative for chest pain, palpitations and leg swelling  Gastrointestinal: Negative for abdominal pain, blood in stool, constipation, diarrhea, nausea and vomiting  Denies heartburn   Genitourinary: Negative for difficulty urinating, dysuria, flank pain and hematuria  Nocturia   Musculoskeletal: Positive for arthralgias (pain in knees) and gait problem (ambulates with a cane)  Negative for joint swelling and myalgias  Skin: Negative for rash and wound  Neurological: Positive for headaches (improved)  Negative for dizziness and syncope  Hematological: Negative  Psychiatric/Behavioral: Negative            Objective:      /84 (BP Location: Left arm, Patient Position: Sitting, Cuff Size: Large)   Pulse 100   Temp 99 4 °F (37 4 °C) (Tympanic)   Resp 20   Wt 100 kg (221 lb 3 2 oz)   SpO2 96%   BMI 31 51 kg/m²        Physical Exam   Constitutional: He appears well-developed and well-nourished  Obese   HENT:   Head: Normocephalic and atraumatic  Right Ear: External ear normal    Left Ear: External ear normal    Mouth/Throat: Oropharynx is clear and moist    Eyes: Pupils are equal, round, and reactive to light  Conjunctivae are normal    Neck: Normal range of motion  No JVD present  Cardiovascular: Normal rate, regular rhythm and normal heart sounds  No murmur heard  Tachycardia   Pulmonary/Chest: Effort normal and breath sounds normal  He has no wheezes  He has no rales  Abdominal: Soft  Bowel sounds are normal  There is no tenderness  Musculoskeletal: He exhibits no edema, tenderness or deformity  Patient ambulates with a cane   Skin: Skin is warm and dry  No rash noted  Psychiatric: He has a normal mood and affect  Nursing note and vitals reviewed

## 2019-02-25 DIAGNOSIS — G43.709 CHRONIC MIGRAINE WITHOUT AURA WITHOUT STATUS MIGRAINOSUS, NOT INTRACTABLE: ICD-10-CM

## 2019-02-25 RX ORDER — SUMATRIPTAN 50 MG/1
TABLET, FILM COATED ORAL
Qty: 9 TABLET | Refills: 0 | Status: SHIPPED | OUTPATIENT
Start: 2019-02-25 | End: 2019-03-22 | Stop reason: SDUPTHER

## 2019-02-25 NOTE — TELEPHONE ENCOUNTER
Pt called stated that he was taking propranolol and his BP has been gradully going down, so he stopped this 2 weeks ago and states that now his /60, states this is a lot lower than normal for him  States that he is not able to say if he benefited from the medication, reports he had fewer migraines, but he is not sure if it was from that medication

## 2019-02-25 NOTE — TELEPHONE ENCOUNTER
If he wants to continue with imitrex alone PRN basis, that is fine  We can discuss alternative meds at a f/u if he would like

## 2019-02-26 NOTE — TELEPHONE ENCOUNTER
I made pt aware  He will call back to schedule a follow up appt  Pt has no further questions at this time

## 2019-03-22 DIAGNOSIS — G43.709 CHRONIC MIGRAINE WITHOUT AURA WITHOUT STATUS MIGRAINOSUS, NOT INTRACTABLE: ICD-10-CM

## 2019-03-25 RX ORDER — SUMATRIPTAN 50 MG/1
TABLET, FILM COATED ORAL
Qty: 9 TABLET | Refills: 0 | Status: SHIPPED | OUTPATIENT
Start: 2019-03-25 | End: 2019-04-25 | Stop reason: SDUPTHER

## 2019-04-11 DIAGNOSIS — G43.709 CHRONIC MIGRAINE WITHOUT AURA WITHOUT STATUS MIGRAINOSUS, NOT INTRACTABLE: ICD-10-CM

## 2019-04-12 RX ORDER — SUMATRIPTAN 50 MG/1
TABLET, FILM COATED ORAL
Qty: 9 TABLET | Refills: 0 | OUTPATIENT
Start: 2019-04-12

## 2019-04-12 RX ORDER — DEXAMETHASONE 2 MG/1
TABLET ORAL
Qty: 5 TABLET | Refills: 0 | Status: SHIPPED | OUTPATIENT
Start: 2019-04-12 | End: 2019-07-29 | Stop reason: ALTCHOICE

## 2019-04-25 DIAGNOSIS — G43.709 CHRONIC MIGRAINE WITHOUT AURA WITHOUT STATUS MIGRAINOSUS, NOT INTRACTABLE: ICD-10-CM

## 2019-04-25 RX ORDER — SUMATRIPTAN 50 MG/1
TABLET, FILM COATED ORAL
Qty: 9 TABLET | Refills: 0 | Status: SHIPPED | OUTPATIENT
Start: 2019-04-25 | End: 2019-05-28 | Stop reason: SDUPTHER

## 2019-04-27 ENCOUNTER — OFFICE VISIT (OUTPATIENT)
Dept: FAMILY MEDICINE CLINIC | Facility: CLINIC | Age: 69
End: 2019-04-27
Payer: COMMERCIAL

## 2019-04-27 VITALS
SYSTOLIC BLOOD PRESSURE: 142 MMHG | HEIGHT: 71 IN | DIASTOLIC BLOOD PRESSURE: 68 MMHG | OXYGEN SATURATION: 95 % | TEMPERATURE: 99.3 F | HEART RATE: 80 BPM | BODY MASS INDEX: 30.1 KG/M2 | RESPIRATION RATE: 16 BRPM | WEIGHT: 215 LBS

## 2019-04-27 DIAGNOSIS — J20.9 ACUTE BRONCHITIS WITH BRONCHOSPASM: Primary | ICD-10-CM

## 2019-04-27 DIAGNOSIS — I10 ESSENTIAL HYPERTENSION: ICD-10-CM

## 2019-04-27 PROCEDURE — 3008F BODY MASS INDEX DOCD: CPT | Performed by: FAMILY MEDICINE

## 2019-04-27 PROCEDURE — 1160F RVW MEDS BY RX/DR IN RCRD: CPT | Performed by: FAMILY MEDICINE

## 2019-04-27 PROCEDURE — 99214 OFFICE O/P EST MOD 30 MIN: CPT | Performed by: FAMILY MEDICINE

## 2019-04-27 RX ORDER — AZITHROMYCIN 250 MG/1
TABLET, FILM COATED ORAL
Qty: 6 TABLET | Refills: 0 | Status: SHIPPED | OUTPATIENT
Start: 2019-04-27 | End: 2019-05-01

## 2019-04-27 RX ORDER — PROMETHAZINE HYDROCHLORIDE AND CODEINE PHOSPHATE 6.25; 1 MG/5ML; MG/5ML
5 SYRUP ORAL EVERY 6 HOURS PRN
Qty: 240 ML | Refills: 0 | Status: SHIPPED | OUTPATIENT
Start: 2019-04-27 | End: 2019-07-29 | Stop reason: ALTCHOICE

## 2019-04-27 RX ORDER — ALBUTEROL SULFATE 90 UG/1
2 AEROSOL, METERED RESPIRATORY (INHALATION) EVERY 6 HOURS PRN
Qty: 1 INHALER | Refills: 0 | Status: SHIPPED | OUTPATIENT
Start: 2019-04-27

## 2019-05-28 DIAGNOSIS — G43.709 CHRONIC MIGRAINE WITHOUT AURA WITHOUT STATUS MIGRAINOSUS, NOT INTRACTABLE: ICD-10-CM

## 2019-05-29 RX ORDER — SUMATRIPTAN 50 MG/1
TABLET, FILM COATED ORAL
Qty: 9 TABLET | Refills: 0 | Status: SHIPPED | OUTPATIENT
Start: 2019-05-29 | End: 2019-09-05 | Stop reason: SDUPTHER

## 2019-06-30 ENCOUNTER — HOSPITAL ENCOUNTER (EMERGENCY)
Facility: HOSPITAL | Age: 69
Discharge: HOME | End: 2019-06-30
Attending: EMERGENCY MEDICINE | Admitting: EMERGENCY MEDICINE
Payer: COMMERCIAL

## 2019-06-30 VITALS
SYSTOLIC BLOOD PRESSURE: 167 MMHG | HEIGHT: 72 IN | DIASTOLIC BLOOD PRESSURE: 90 MMHG | RESPIRATION RATE: 18 BRPM | OXYGEN SATURATION: 97 % | WEIGHT: 210 LBS | TEMPERATURE: 97.8 F | HEART RATE: 74 BPM | BODY MASS INDEX: 28.44 KG/M2

## 2019-06-30 DIAGNOSIS — R11.0 NAUSEA: ICD-10-CM

## 2019-06-30 DIAGNOSIS — G43.909 MIGRAINE: Primary | ICD-10-CM

## 2019-06-30 PROCEDURE — 99283 EMERGENCY DEPT VISIT LOW MDM: CPT

## 2019-06-30 PROCEDURE — 99284 EMERGENCY DEPT VISIT MOD MDM: CPT | Performed by: EMERGENCY MEDICINE

## 2019-06-30 PROCEDURE — 96372 THER/PROPH/DIAG INJ SC/IM: CPT

## 2019-06-30 PROCEDURE — 96365 THER/PROPH/DIAG IV INF INIT: CPT

## 2019-06-30 PROCEDURE — 96375 TX/PRO/DX INJ NEW DRUG ADDON: CPT

## 2019-06-30 RX ORDER — SUMATRIPTAN 100 MG/1
50 TABLET, FILM COATED ORAL ONCE AS NEEDED
Qty: 15 TABLET | Refills: 0 | Status: SHIPPED | OUTPATIENT
Start: 2019-06-30 | End: 2019-07-29 | Stop reason: ALTCHOICE

## 2019-06-30 RX ORDER — MAGNESIUM SULFATE HEPTAHYDRATE 40 MG/ML
2 INJECTION, SOLUTION INTRAVENOUS ONCE
Status: COMPLETED | OUTPATIENT
Start: 2019-06-30 | End: 2019-06-30

## 2019-06-30 RX ORDER — KETOROLAC TROMETHAMINE 30 MG/ML
15 INJECTION, SOLUTION INTRAMUSCULAR; INTRAVENOUS ONCE
Status: COMPLETED | OUTPATIENT
Start: 2019-06-30 | End: 2019-06-30

## 2019-06-30 RX ORDER — METOCLOPRAMIDE HYDROCHLORIDE 5 MG/ML
10 INJECTION INTRAMUSCULAR; INTRAVENOUS ONCE
Status: COMPLETED | OUTPATIENT
Start: 2019-06-30 | End: 2019-06-30

## 2019-06-30 RX ORDER — ACETAMINOPHEN 325 MG/1
975 TABLET ORAL ONCE
Status: COMPLETED | OUTPATIENT
Start: 2019-06-30 | End: 2019-06-30

## 2019-06-30 RX ORDER — SUMATRIPTAN 6 MG/.5ML
6 INJECTION, SOLUTION SUBCUTANEOUS ONCE
Status: COMPLETED | OUTPATIENT
Start: 2019-06-30 | End: 2019-06-30

## 2019-06-30 RX ADMIN — SUMATRIPTAN 6 MG: 6 INJECTION, SOLUTION SUBCUTANEOUS at 21:46

## 2019-06-30 RX ADMIN — ACETAMINOPHEN 975 MG: 325 TABLET ORAL at 20:37

## 2019-06-30 RX ADMIN — MAGNESIUM SULFATE HEPTAHYDRATE 2 G: 40 INJECTION, SOLUTION INTRAVENOUS at 20:37

## 2019-06-30 RX ADMIN — KETOROLAC TROMETHAMINE 15 MG: 30 INJECTION, SOLUTION INTRAMUSCULAR at 20:37

## 2019-06-30 RX ADMIN — METOCLOPRAMIDE 10 MG: 5 INJECTION, SOLUTION INTRAMUSCULAR; INTRAVENOUS at 20:37

## 2019-07-01 NOTE — ED ATTENDING ATTESTATION
Himanshu Escalona MD, saw and evaluated the patient  I have discussed the patient with the resident/non-physician practitioner and agree with the resident's/non-physician practitioner's findings, Plan of Care, and MDM as documented in the resident's/non-physician practitioner's note, except where noted  All available labs and Radiology studies were reviewed  I was present for key portions of any procedure(s) performed by the resident/non-physician practitioner and I was immediately available to provide assistance  At this point I agree with the current assessment done in the Emergency Department  I have conducted an independent evaluation of this patient a history and physical is as follows:    80-year-old male with history of chronic migraines about 2 per week presented for treatment after he developed a migraine at home today and had run out of his abortive medication Imitrex  States that it has been few hours since the onset of the headache and that it is a typical migraine for him with photophobia and some nausea but no vomiting  Pain throbbing, fairly diffuse, no radiation  No neurologic deficits on exam   Will plan dose of Imitrex here as well as migraine cocktail and will re-evaluate       Critical Care Time  Procedures

## 2019-07-01 NOTE — ED PROVIDER NOTES
History  Chief Complaint   Patient presents with    Migraine     Pt states he has a hx of migraines and take imatrex for them, however he ran out of his medications last week and has had a migraine since this afternoon       This 63-year-old male with previous medical history of migraines presenting emergency department with a migraine consistent with previous migraines  Patient said he had onset of symptoms around noon today  Patient states he usually takes Imitrex however he is  Out of Imitrex  Patient denies maximal onset of symptoms, neck stiffness, fevers, chills  Patient admits nausea without emesis  Patient denies any recent sickness or illnesses such as diarrhea, constipation, upper respiratory infections  Patient denies neurological deficits such as change in vision, hearing, numbness, tingling, weakness, inability to ambulate  Migraine   Severity:  Severe  Onset quality:  Gradual  Timing:  Constant  Progression:  Worsening  Chronicity:  Chronic  Relieved by:  Imitrex  Worsened by:  Photophobia, phonophobia  Associated symptoms: headaches    Headache - Recurrent or Known Dx Migraines       Prior to Admission Medications   Prescriptions Last Dose Informant Patient Reported? Taking? Magnesium 400 MG TABS  Self No Yes   Sig: Take 1 tablet (400 mg total) by mouth daily   Naproxen Sodium (ALEVE) 220 MG CAPS  Self Yes Yes   Sig: Take by mouth   SUMAtriptan (IMITREX) 50 mg tablet   No Yes   Sig: Take 1 tab at migraine onset, max 2 tabs in 24 hours  Max 2 days a week   albuterol (VENTOLIN HFA) 90 mcg/act inhaler   No Yes   Sig: Inhale 2 puffs every 6 (six) hours as needed for wheezing   dexamethasone (DECADRON) 2 mg tablet  Self No Yes   Si tab qam with food prn migraine     promethazine-codeine (PHENERGAN WITH CODEINE) 6 25-10 mg/5 mL syrup   No No   Sig: Take 5 mL by mouth every 6 (six) hours as needed for cough   propranolol (INDERAL) 40 mg tablet  Self No Yes   Si tablet at bedtime ranitidine (ZANTAC) 150 mg tablet  Self Yes No   Sig: Take 150 mg by mouth 2 (two) times a day      Facility-Administered Medications: None       Past Medical History:   Diagnosis Date    Arthritis     Heart murmur     No pluerisy    Hypertension     Migraine        History reviewed  No pertinent surgical history  Family History   Problem Relation Age of Onset    Cancer Mother     Migraines Father     Bipolar disorder Brother     Migraines Maternal Grandmother      I have reviewed and agree with the history as documented  Social History     Tobacco Use    Smoking status: Former Smoker     Last attempt to quit:      Years since quittin 5    Smokeless tobacco: Never Used   Substance Use Topics    Alcohol use: Yes     Comment: Whiskey and beer    Drug use: No        Review of Systems   Neurological: Positive for headaches  All other systems reviewed and are negative  Physical Exam  ED Triage Vitals [19]   Temperature Pulse Respirations Blood Pressure SpO2   97 8 °F (36 6 °C) 74 18 167/90 97 %      Temp Source Heart Rate Source Patient Position - Orthostatic VS BP Location FiO2 (%)   Oral Monitor Lying Left arm --      Pain Score       Worst Possible Pain             Orthostatic Vital Signs  Vitals:    19   BP: 167/90   Pulse: 74   Patient Position - Orthostatic VS: Lying       Physical Exam   Constitutional: He appears well-developed and well-nourished  No distress  HENT:   Head: Normocephalic and atraumatic  Right Ear: External ear normal    Left Ear: External ear normal    Eyes: Conjunctivae and EOM are normal    Neck: No JVD present  No tracheal deviation present  Cardiovascular: Normal rate, regular rhythm, normal heart sounds and intact distal pulses  No murmur heard  Pulmonary/Chest: Breath sounds normal  No stridor  No respiratory distress  He has no wheezes  He has no rales  Abdominal: Soft   Bowel sounds are normal  He exhibits no distension and no mass  There is no tenderness  There is no rebound and no guarding  Genitourinary:   Genitourinary Comments: Deferred   Musculoskeletal: He exhibits no edema, tenderness or deformity  Neurological: He exhibits normal muscle tone  Coordination normal    Muscular strength 5/5 in the upper lower extremities bilaterally, negative point-to-point exam, negative pronator drift, intact light sensation in the upper lower extremities bilaterally, ambulates without difficulty  Skin: Skin is warm and dry  Capillary refill takes less than 2 seconds  No rash noted  He is not diaphoretic  No erythema  No pallor  Psychiatric: He has a normal mood and affect  His behavior is normal  Judgment and thought content normal    Nursing note and vitals reviewed  ED Medications  Medications   metoclopramide (REGLAN) injection 10 mg (10 mg Intravenous Given 6/30/19 2037)   ketorolac (TORADOL) injection 15 mg (15 mg Intravenous Given 6/30/19 2037)   acetaminophen (TYLENOL) tablet 975 mg (975 mg Oral Given 6/30/19 2037)   magnesium sulfate 2 g/50 mL IVPB (premix) 2 g (0 g Intravenous Stopped 6/30/19 2141)   SUMAtriptan (IMITREX) subcutaneous injection 6 mg (6 mg Subcutaneous Given 6/30/19 2146)       Diagnostic Studies  Results Reviewed     None                 No orders to display         Procedures  Procedures        ED Course  ED Course as of Jun 30 2218   Sun Jun 30, 2019 2212 Patient has had resolution of his headache  Identification of Seniors at Risk      Most Recent Value   (ISAR) Identification of Seniors at Risk   Before the illness or injury that brought you to the Emergency, did you need someone to help you on a regular basis? 0 Filed at: 06/30/2019 2016   In the last 24 hours, have you needed more help than usual?  0 Filed at: 06/30/2019 2016   Have you been hospitalized for one or more nights during the past 6 months?   0 Filed at: 06/30/2019 2016   In general, do you see well?  0 Filed at: 06/30/2019 2016   In general, do you have serious problems with your memory? 0 Filed at: 06/30/2019 2016   Do you take more than three different medications every day? 1 Filed at: 06/30/2019 2016   ISAR Score  1 Filed at: 06/30/2019 2016                          [facility]  Number of Diagnoses or Management Options  Migraine: established and worsening  Nausea: established and worsening  Diagnosis management comments: This 72-year-old male presenting emergency department headache consistent with previous headaches  Patient will be told treated with Imitrex, Reglan, Toradol, magnesium  Planned for discharge the patient back home with a script for Imitrex  Patient come back to the emergency department if he has any fevers, neurological deficits  Amount and/or Complexity of Data Reviewed  Review and summarize past medical records: yes    Risk of Complications, Morbidity, and/or Mortality  Presenting problems: minimal  Diagnostic procedures: minimal  Management options: minimal    Patient Progress  Patient progress: stable      Disposition  Final diagnoses:   Migraine   Nausea     Time reflects when diagnosis was documented in both MDM as applicable and the Disposition within this note     Time User Action Codes Description Comment    6/30/2019 10:15 PM Pratibha Ellison [G43 [id number]] Migraine     6/30/2019 10:15 PM Pratibha Ellison [R11 [id number]] Nausea       ED Disposition     ED Disposition Condition Date/Time Comment    Discharge Fair Sun Jun 30, 2019 10:15 PM Amie Bailey discharge to home/self care              Follow-up Information     Follow up With Specialties Details Why Contact Info Additional Information    Antwon Davila MD Family Medicine In 1 week  [address] [facility] [id number]       [address] [place] [address] Emergency Department Emergency Medicine  If symptoms worsen [address]  [phone]  ED, [address] [place] [address], [phone]          Patient's Medications   Discharge Prescriptions    SUMATRIPTAN (IMITREX) 100 MG TABLET    Take 0 5 tablets (50 mg total) by mouth once as needed for migraine for up to 30 doses       Start Date: 6/30/2019 End Date: --       Order Dose: 50 mg       Quantity: 15 tablet    Refills: 0     No discharge procedures on file  ED Provider  Attending physically available and evaluated Josr Frias I managed the patient along with the ED Attending      Electronically Signed by         Manny Collins DO  06/30/19 1356

## 2019-07-27 PROBLEM — J20.9 ACUTE BRONCHITIS WITH BRONCHOSPASM: Status: RESOLVED | Noted: 2019-04-27 | Resolved: 2019-07-27

## 2019-07-29 ENCOUNTER — OFFICE VISIT (OUTPATIENT)
Dept: FAMILY MEDICINE CLINIC | Facility: CLINIC | Age: 69
End: 2019-07-29
Payer: COMMERCIAL

## 2019-07-29 VITALS
WEIGHT: 210 LBS | OXYGEN SATURATION: 97 % | DIASTOLIC BLOOD PRESSURE: 80 MMHG | TEMPERATURE: 99.7 F | HEART RATE: 100 BPM | SYSTOLIC BLOOD PRESSURE: 124 MMHG | HEIGHT: 72 IN | RESPIRATION RATE: 20 BRPM | BODY MASS INDEX: 28.44 KG/M2

## 2019-07-29 DIAGNOSIS — J20.9 ACUTE BRONCHITIS WITH BRONCHOSPASM: ICD-10-CM

## 2019-07-29 DIAGNOSIS — E66.09 CLASS 1 OBESITY DUE TO EXCESS CALORIES WITHOUT SERIOUS COMORBIDITY WITH BODY MASS INDEX (BMI) OF 31.0 TO 31.9 IN ADULT: ICD-10-CM

## 2019-07-29 DIAGNOSIS — K21.9 GASTROESOPHAGEAL REFLUX DISEASE WITHOUT ESOPHAGITIS: ICD-10-CM

## 2019-07-29 DIAGNOSIS — G43.709 CHRONIC MIGRAINE WITHOUT AURA WITHOUT STATUS MIGRAINOSUS, NOT INTRACTABLE: ICD-10-CM

## 2019-07-29 DIAGNOSIS — Z12.11 SCREENING FOR COLON CANCER: ICD-10-CM

## 2019-07-29 DIAGNOSIS — I10 ESSENTIAL HYPERTENSION: Primary | ICD-10-CM

## 2019-07-29 PROCEDURE — 3074F SYST BP LT 130 MM HG: CPT | Performed by: FAMILY MEDICINE

## 2019-07-29 PROCEDURE — 1160F RVW MEDS BY RX/DR IN RCRD: CPT | Performed by: FAMILY MEDICINE

## 2019-07-29 PROCEDURE — 3008F BODY MASS INDEX DOCD: CPT | Performed by: FAMILY MEDICINE

## 2019-07-29 PROCEDURE — 1036F TOBACCO NON-USER: CPT | Performed by: FAMILY MEDICINE

## 2019-07-29 PROCEDURE — 99214 OFFICE O/P EST MOD 30 MIN: CPT | Performed by: FAMILY MEDICINE

## 2019-07-29 PROCEDURE — 3725F SCREEN DEPRESSION PERFORMED: CPT | Performed by: FAMILY MEDICINE

## 2019-07-29 RX ORDER — PREDNISONE 10 MG/1
TABLET ORAL
Qty: 21 TABLET | Refills: 0 | Status: SHIPPED | OUTPATIENT
Start: 2019-07-29 | End: 2019-10-01 | Stop reason: SDUPTHER

## 2019-07-29 RX ORDER — PROMETHAZINE HYDROCHLORIDE AND CODEINE PHOSPHATE 6.25; 1 MG/5ML; MG/5ML
5 SYRUP ORAL EVERY 6 HOURS PRN
Qty: 240 ML | Refills: 0 | Status: SHIPPED | OUTPATIENT
Start: 2019-07-29 | End: 2019-10-01 | Stop reason: ALTCHOICE

## 2019-07-29 RX ORDER — AZITHROMYCIN 250 MG/1
TABLET, FILM COATED ORAL
Qty: 6 TABLET | Refills: 0 | Status: SHIPPED | OUTPATIENT
Start: 2019-07-29 | End: 2019-08-02

## 2019-07-29 NOTE — ASSESSMENT & PLAN NOTE
Patient c/o occasional heartburn  Recommended to take Ranitidine 100 mg 1 tablet twice daily before meals  Avoid caffeine, fried, fatty foods, late meals

## 2019-07-29 NOTE — PROGRESS NOTES
Chief Complaint   Patient presents with    Follow-up     6 month follow up    Cough     Chronic Cough     Health Maintenance   Topic Date Due    Hepatitis C Screening  1950    CRC Screening: Colonoscopy  1950    BMI: Followup Plan  09/08/1970    DTaP,Tdap,and Td Vaccines (1 - Tdap) 09/08/1973    Pneumococcal Vaccine: 65+ Years (1 of 2 - PCV13) 09/08/2017    INFLUENZA VACCINE  09/19/2019 (Originally 7/1/2019)    Fall Risk  01/28/2020    Depression Screening PHQ  01/28/2020    Medicare Annual Wellness Visit (AWV)  01/28/2020    BMI: Adult  06/30/2020    Pneumococcal Vaccine: Pediatrics (0 to 5 Years) and At-Risk Patients (6 to 59 Years)  Aged Out    HEPATITIS B VACCINES  Aged Out     Assessment/Plan:    Essential hypertension  BP is stable  Patient does not take any blood pressure medication  Recommended to follow a low-sodium diet, regular exercise  Recommended to stay well hydrated, avoid caffeine due to sinus tachycardia  Class 1 obesity due to excess calories without serious comorbidity with body mass index (BMI) of 31 0 to 31 9 in adult  Patient lost 11 lb since January 2019  Continue to work on weight reduction  Chronic migraine without aura or status migrainosus  Patient stopped taking Propranolol 40 mg daily as was prescribed by neurologist Dr Lan Carlisle  He states that Propranolol was not effective for management of migraine headaches  He takes Imitrex 100 mg 2-3 times per week PRN  Also takes Magnesium 400 mg daily for migraine prophylaxis  Recommended to avoid migraine triggers  Follow- up with neurology  Gastroesophageal reflux disease without esophagitis  Patient c/o occasional heartburn  Recommended to take Ranitidine 100 mg 1 tablet twice daily before meals  Avoid caffeine, fried, fatty foods, late meals  Acute bronchitis with bronchospasm  Start Zithromax for 5 days, Promethazine with Codeine 1 tsp every 6 hr PRN for cough      Use Ventolin HFA inhaler PRN for chest tightness or wheezing  Will start on Prednisone taper  Patient was advised to call office if symptoms persist or worsen  Consider referral to pulmonology if continues with persistent dry cough  HM: patient refusing colonoscopy  Will check  FIT test     Check labs as ordered in 1/19  Schedule follow-up office visit in 6 months  Diagnoses and all orders for this visit:    Essential hypertension    Class 1 obesity due to excess calories without serious comorbidity with body mass index (BMI) of 31 0 to 31 9 in adult    Chronic migraine without aura without status migrainosus, not intractable    Gastroesophageal reflux disease without esophagitis    Acute bronchitis with bronchospasm  -     azithromycin (ZITHROMAX) 250 mg tablet; Take 2 tablets today then 1 tablet daily x 4 days  -     predniSONE 10 mg tablet; Take 4 tab  for 3 days, then 2 tab  for 3 days, then 1 tab  for 3 days, then stop  -     promethazine-codeine (PHENERGAN WITH CODEINE) 6 25-10 mg/5 mL syrup; Take 5 mL by mouth every 6 (six) hours as needed for cough    Screening for colon cancer  -     Occult Blood, Fecal Immunochemical; Future          Subjective:      Patient ID: Lanelle Hashimoto is a 79 y o  male  HPI     Patient presents for 6 month follow-up office visit  PMHx: HTN, sinus tachycardia, chronic migraine headaches, GERD  Reviewed current medications  Patient did not go for blood work as was ordered in 1/19  Patient c/o persistent dry cough, some chest tightness, wheezing for the past   4 weeks  He was seen in April 2019 for acute bronchitis with bronchospasm  He states that respiratory symptoms improved with antibiotic therapy, using Ventolin HFA inhaler, cough syrup, but then started coughing again 4 weeks ago  Patient is a former smoker  Quit smoking 15 years ago  Denies fever, chills  HTN - blood pressure is stable    Patient denies chest pain, shortness of breath, dizziness  He does not take any blood pressure medication currently  He stopped taking Propranolol 40 mg as was prescribed by neurologist Jarrett Law in November 2018  He takes Imitrex 100 mg 2-3 times per week PRN for migraine headaches  Admits drinking 2 cups of coffee daily  GERD - patient c/o occasional heartburn  He does not take Ranitidine 150 mg twice daily, only PRN  Denies abdominal pain  Patient refusing colonoscopy  He did not sent back Cologuard kit  Patient does not drive a car  He ambulates with a cane  Denies falls  Drinks whiskey 2-3 drinks daily  Patient states that he had Pneumovax done over 2 years ago at previous PCP's office  The following portions of the patient's history were reviewed and updated as appropriate: allergies, current medications, past family history, past medical history, past social history, past surgical history and problem list     Review of Systems   Constitutional: Positive for fatigue (mild)  Negative for activity change, appetite change, chills and fever  HENT: Negative for congestion, ear pain, hearing loss, nosebleeds, postnasal drip, sore throat and trouble swallowing  Eyes: Positive for photophobia  Negative for pain, discharge, itching and visual disturbance  Wears glasses   Respiratory: Positive for cough, chest tightness and wheezing  Negative for shortness of breath  Cardiovascular: Negative for chest pain, palpitations and leg swelling  Gastrointestinal: Negative for abdominal pain, blood in stool, constipation, diarrhea, nausea and vomiting  C/o occasional heartburn   Genitourinary: Negative for difficulty urinating, dysuria, flank pain, frequency and hematuria  Nocturia   Musculoskeletal: Positive for arthralgias (BL knee pain ) and gait problem (ambulates with a cane)  Negative for back pain, joint swelling and neck pain  Skin: Negative for rash and wound     Neurological: Positive for headaches  Negative for dizziness and syncope  Hematological: Negative  Psychiatric/Behavioral: Negative  Objective:      /80 (BP Location: Left arm, Patient Position: Sitting, Cuff Size: Standard)   Pulse 100   Temp 99 7 °F (37 6 °C) (Tympanic)   Resp 20   Ht 6' (1 829 m)   Wt 95 3 kg (210 lb)   SpO2 97%   BMI 28 48 kg/m²          Physical Exam   Constitutional: He appears well-developed and well-nourished  HENT:   Head: Normocephalic and atraumatic  Right Ear: External ear normal    Left Ear: External ear normal    Mouth/Throat: Oropharynx is clear and moist    Eyes: Pupils are equal, round, and reactive to light  Conjunctivae are normal    Neck: Normal range of motion  Neck supple  No JVD present  Cardiovascular: Normal rate, regular rhythm and normal heart sounds  No murmur heard  Sinus tachycardia  No carotid bruits BL  No BL LE edema   Pulmonary/Chest: Effort normal  No respiratory distress  Coarse breath sounds BL   Abdominal: Soft  Bowel sounds are normal  There is no tenderness  Musculoskeletal:   Patient ambulates with a cane  No joint swelling or tenderness   Skin: Skin is warm and dry  No rash noted  Psychiatric: He has a normal mood and affect  Nursing note and vitals reviewed  BMI Counseling: Body mass index is 28 48 kg/m²  Discussed the patient's BMI with him  The BMI is above average  BMI counseling and education was provided to the patient  Nutrition recommendations include reducing portion sizes, decreasing overall calorie intake, 3-5 servings of fruits/vegetables daily, reducing fast food intake, consuming healthier snacks, decreasing soda and/or juice intake, moderation in carbohydrate intake, increasing intake of lean protein, reducing intake of saturated fat and trans fat and reducing intake of cholesterol  Exercise recommendations include moderate aerobic physical activity for 150 minutes/week

## 2019-07-29 NOTE — ASSESSMENT & PLAN NOTE
Start Zithromax for 5 days, Promethazine with Codeine 1 tsp every 6 hr PRN for cough  Use Ventolin HFA inhaler PRN for chest tightness or wheezing  Will start on Prednisone taper  Patient was advised to call office if symptoms persist or worsen  Consider referral to pulmonology if continues with persistent dry cough

## 2019-07-29 NOTE — ASSESSMENT & PLAN NOTE
BP is stable  Patient does not take any blood pressure medication  Recommended to follow a low-sodium diet, regular exercise  Recommended to stay well hydrated, avoid caffeine due to sinus tachycardia

## 2019-07-29 NOTE — ASSESSMENT & PLAN NOTE
Patient stopped taking Propranolol 40 mg daily as was prescribed by neurologist Dr Herndon Ranks  He states that Propranolol was not effective for management of migraine headaches  He takes Imitrex 100 mg 2-3 times per week PRN  Also takes Magnesium 400 mg daily for migraine prophylaxis  Recommended to avoid migraine triggers  Follow- up with neurology

## 2019-09-05 DIAGNOSIS — G43.709 CHRONIC MIGRAINE WITHOUT AURA WITHOUT STATUS MIGRAINOSUS, NOT INTRACTABLE: ICD-10-CM

## 2019-09-05 RX ORDER — SUMATRIPTAN 50 MG/1
TABLET, FILM COATED ORAL
Qty: 9 TABLET | Refills: 0 | Status: SHIPPED | OUTPATIENT
Start: 2019-09-05 | End: 2019-10-18 | Stop reason: SDUPTHER

## 2019-09-05 NOTE — TELEPHONE ENCOUNTER
Patient requesting imitrex refill be sent to [facility] [place]  Requesting a call once it's sent   [phone]

## 2019-09-17 ENCOUNTER — TELEPHONE (OUTPATIENT)
Dept: NEUROLOGY | Facility: CLINIC | Age: 69
End: 2019-09-17

## 2019-09-17 NOTE — TELEPHONE ENCOUNTER
Received a [facility] Medical Exception form for sumatriptan  Emailed to JAMSHID KIM  For signature  Please have your MA fax to them

## 2019-09-26 NOTE — TELEPHONE ENCOUNTER
Received fax from [facility] stating sumatriptan approved from 9/18/19-3/18/20  However, review code states to call help desk  Called [facility] provider services at [phone] and spoke with [facility]  She states nothing is needed at this time

## 2019-09-30 ENCOUNTER — TELEPHONE (OUTPATIENT)
Dept: NEUROLOGY | Facility: CLINIC | Age: 69
End: 2019-09-30

## 2019-10-01 ENCOUNTER — OFFICE VISIT (OUTPATIENT)
Dept: FAMILY MEDICINE CLINIC | Facility: CLINIC | Age: 69
End: 2019-10-01
Payer: COMMERCIAL

## 2019-10-01 VITALS
HEIGHT: 72 IN | HEART RATE: 92 BPM | TEMPERATURE: 98.2 F | OXYGEN SATURATION: 96 % | SYSTOLIC BLOOD PRESSURE: 132 MMHG | WEIGHT: 208 LBS | BODY MASS INDEX: 28.17 KG/M2 | DIASTOLIC BLOOD PRESSURE: 86 MMHG | RESPIRATION RATE: 16 BRPM

## 2019-10-01 DIAGNOSIS — J20.9 ACUTE BRONCHITIS WITH BRONCHOSPASM: Primary | ICD-10-CM

## 2019-10-01 PROCEDURE — 99214 OFFICE O/P EST MOD 30 MIN: CPT | Performed by: FAMILY MEDICINE

## 2019-10-01 PROCEDURE — 3008F BODY MASS INDEX DOCD: CPT | Performed by: FAMILY MEDICINE

## 2019-10-01 RX ORDER — PREDNISONE 10 MG/1
TABLET ORAL
Qty: 21 TABLET | Refills: 0 | Status: SHIPPED | OUTPATIENT
Start: 2019-10-01 | End: 2019-12-16

## 2019-10-01 RX ORDER — DEXTROMETHORPHAN HYDROBROMIDE AND PROMETHAZINE HYDROCHLORIDE 15; 6.25 MG/5ML; MG/5ML
5 SOLUTION ORAL 4 TIMES DAILY PRN
Qty: 180 ML | Refills: 0 | Status: SHIPPED | OUTPATIENT
Start: 2019-10-01 | End: 2019-12-16

## 2019-10-01 RX ORDER — DOXYCYCLINE 100 MG/1
100 CAPSULE ORAL 2 TIMES DAILY
Qty: 20 CAPSULE | Refills: 0 | Status: SHIPPED | OUTPATIENT
Start: 2019-10-01 | End: 2019-10-11

## 2019-10-01 NOTE — PROGRESS NOTES
Chief Complaint   Patient presents with    URI     Symptoms congestion in throat, post nasal drip, non productive cough, and migraine for couple days  Assessment/Plan:    A lot of fluids and rest  Tylenol or motrin for fever or pain  May use OTC flonase  Give doxycycline  Side effects educated patient  Give prednisone  SE educated pt  Give cough medication  Side effects educated patient  Will check CXR  Call office if symptoms no improving or worse  Diagnoses and all orders for this visit:    Acute bronchitis with bronchospasm  -     doxycycline monohydrate (MONODOX) 100 mg capsule; Take 1 capsule (100 mg total) by mouth 2 (two) times a day for 10 days  -     predniSONE 10 mg tablet; Take 4 tab  for 3 days, then 2 tab  for 3 days, then 1 tab  for 3 days, then stop  -     XR chest pa & lateral; Future  -     Promethazine-DM (PHENERGAN-DM) 6 25-15 mg/5 mL oral syrup; Take 5 mL by mouth 4 (four) times a day as needed for cough          Subjective:      Patient ID: Luis Antonio Holliday is a 81 y o  male  HPI    Pt is here by himself  C/o cough for several weeks  Some running nose  No ear pain or sore throat  Dry cough, sometimes wheezing  No SOB  He uses albuterol Q 6 hours daily  Last use albuterol was this morning and in the waiting room per pt  Denies fever  Denies chest pain, n/v/abd pain  No smoking  Denies hx of asthma or COPD  Last bronchitis was the end of July  Got zpack and prednisone at that time  Pt states he got better after medications and then symptoms coming back again  The following portions of the patient's history were reviewed and updated as appropriate: allergies, current medications, past family history, past medical history, past social history, past surgical history and problem list     Review of Systems   Constitutional: Negative for appetite change, chills and fever  HENT: Positive for rhinorrhea   Negative for congestion, ear pain, sinus pain and sore throat  Eyes: Negative for discharge and itching  Respiratory: Positive for cough and wheezing  Negative for apnea, chest tightness and shortness of breath  Cardiovascular: Negative for chest pain, palpitations and leg swelling  Gastrointestinal: Negative for abdominal pain, anal bleeding, constipation, diarrhea, nausea and vomiting  Endocrine: Negative for cold intolerance, heat intolerance and polyuria  Genitourinary: Negative for difficulty urinating and dysuria  Musculoskeletal: Negative for arthralgias, back pain and myalgias  Skin: Negative for rash  Neurological: Negative for dizziness and headaches  Psychiatric/Behavioral: Negative for agitation  Objective:      /86 (BP Location: Left arm, Patient Position: Sitting, Cuff Size: Adult)   Pulse 92   Temp 98 2 °F (36 8 °C) (Tympanic)   Resp 16   Ht 6' (1 829 m)   Wt 94 3 kg (208 lb)   SpO2 96%   BMI 28 21 kg/m²          Physical Exam   Constitutional: He appears well-developed  HENT:   Head: Normocephalic and atraumatic  Right Ear: External ear normal    Left Ear: External ear normal    Mouth/Throat: Oropharynx is clear and moist    B/l nasal swollen   Eyes: Pupils are equal, round, and reactive to light  Conjunctivae are normal    Neck: Normal range of motion  Neck supple  Cardiovascular: Normal rate, regular rhythm, normal heart sounds and intact distal pulses  Exam reveals no gallop and no friction rub  No murmur heard  Pulmonary/Chest: Effort normal and breath sounds normal  No stridor  No respiratory distress  He has no wheezes  He has no rales  He exhibits no tenderness  Abdominal: Soft  Bowel sounds are normal    Musculoskeletal: Normal range of motion  Neurological: He is alert

## 2019-10-18 DIAGNOSIS — G43.709 CHRONIC MIGRAINE WITHOUT AURA WITHOUT STATUS MIGRAINOSUS, NOT INTRACTABLE: ICD-10-CM

## 2019-10-18 RX ORDER — SUMATRIPTAN 50 MG/1
TABLET, FILM COATED ORAL
Qty: 9 TABLET | Refills: 0 | Status: SHIPPED | OUTPATIENT
Start: 2019-10-18 | End: 2019-11-19 | Stop reason: SDUPTHER

## 2019-10-28 ENCOUNTER — TELEPHONE (OUTPATIENT)
Dept: NEUROLOGY | Facility: CLINIC | Age: 69
End: 2019-10-28

## 2019-11-19 DIAGNOSIS — G43.709 CHRONIC MIGRAINE WITHOUT AURA WITHOUT STATUS MIGRAINOSUS, NOT INTRACTABLE: ICD-10-CM

## 2019-11-19 RX ORDER — SUMATRIPTAN 50 MG/1
TABLET, FILM COATED ORAL
Qty: 9 TABLET | Refills: 0 | Status: SHIPPED | OUTPATIENT
Start: 2019-11-19 | End: 2019-12-19 | Stop reason: SDUPTHER

## 2019-11-19 NOTE — TELEPHONE ENCOUNTER
Medication refill check list    Correct patient? yes   Correct medication name, dose, and pill size? yes   Correct provider? yes   Last and Next appt  scheduled? Yes, last date 11/2/18 & next date 1/21/20   Right pharmacy listed? yes   Correct quantity for 30 or 90 days? yes   Is the patient out of refills? When was it last prescribed? Yes, date 10/18/19   Directions match what the patient says they are taking?  yes   Enough refills? (none for controlled substances, 1 year for routine medications) no

## 2019-12-16 ENCOUNTER — OFFICE VISIT (OUTPATIENT)
Dept: FAMILY MEDICINE CLINIC | Facility: CLINIC | Age: 69
End: 2019-12-16
Payer: COMMERCIAL

## 2019-12-16 VITALS
HEIGHT: 72 IN | HEART RATE: 88 BPM | BODY MASS INDEX: 27.77 KG/M2 | DIASTOLIC BLOOD PRESSURE: 68 MMHG | RESPIRATION RATE: 16 BRPM | SYSTOLIC BLOOD PRESSURE: 122 MMHG | WEIGHT: 205 LBS | TEMPERATURE: 99.7 F | OXYGEN SATURATION: 97 %

## 2019-12-16 DIAGNOSIS — J20.9 ACUTE BRONCHITIS, UNSPECIFIED ORGANISM: Primary | ICD-10-CM

## 2019-12-16 PROCEDURE — 1036F TOBACCO NON-USER: CPT | Performed by: NURSE PRACTITIONER

## 2019-12-16 PROCEDURE — 1160F RVW MEDS BY RX/DR IN RCRD: CPT | Performed by: NURSE PRACTITIONER

## 2019-12-16 PROCEDURE — 3008F BODY MASS INDEX DOCD: CPT | Performed by: NURSE PRACTITIONER

## 2019-12-16 PROCEDURE — 99213 OFFICE O/P EST LOW 20 MIN: CPT | Performed by: NURSE PRACTITIONER

## 2019-12-16 RX ORDER — AZITHROMYCIN 250 MG/1
TABLET, FILM COATED ORAL
Qty: 6 TABLET | Refills: 0 | Status: SHIPPED | OUTPATIENT
Start: 2019-12-16 | End: 2019-12-20

## 2019-12-16 RX ORDER — DEXTROMETHORPHAN HYDROBROMIDE AND PROMETHAZINE HYDROCHLORIDE 15; 6.25 MG/5ML; MG/5ML
5 SOLUTION ORAL 4 TIMES DAILY PRN
Qty: 118 ML | Refills: 0 | Status: SHIPPED | OUTPATIENT
Start: 2019-12-16 | End: 2021-05-18 | Stop reason: ALTCHOICE

## 2019-12-16 NOTE — PROGRESS NOTES
Chief Complaint   Patient presents with    Cough     patient complains of coughing and lack of apetiete      Health Maintenance   Topic Date Due    Hepatitis C Screening  1950    CRC Screening: Colonoscopy  1950    DTaP,Tdap,and Td Vaccines (1 - Tdap) 09/08/1963    Pneumococcal Vaccine: 65+ Years (1 of 2 - PCV13) 09/08/2017    Influenza Vaccine  07/01/2019    Fall Risk  01/28/2020    Medicare Annual Wellness Visit (AWV)  01/28/2020    Depression Screening PHQ  07/29/2020    BMI: Followup Plan  07/29/2020    BMI: Adult  10/01/2020    Pneumococcal Vaccine: Pediatrics (0 to 5 Years) and At-Risk Patients (6 to 59 Years)  Aged Out    HIB Vaccine  Aged Out    Hepatitis B Vaccine  Aged Out    IPV Vaccine  Aged Out    Hepatitis A Vaccine  Aged Out    Meningococcal ACWY Vaccine  Aged Out    HPV Vaccine  Aged Out     Assessment/Plan:    Acute bronchitis- to start Azithromycin  SE reviewed  May take Promethazine DM prn for the cough  Increase PO fluids and rest   Warm, salt water gargles, throat lozenges  OTC Tylenol prn, max of 3g/24 hours  Call office if symptoms worsen or persist      Diagnoses and all orders for this visit:    Acute bronchitis, unspecified organism  -     azithromycin (ZITHROMAX) 250 mg tablet; Take 2 tablets today then 1 tablet daily x 4 days  -     Promethazine-DM (PHENERGAN-DM) 6 25-15 mg/5 mL oral syrup; Take 5 mL by mouth 4 (four) times a day as needed for cough          Subjective:      Patient ID: Zofia Verma is a 81 y o  male      HPI     Pt presents by himself today for an acute visit   C/o a dry cough for the past 3 days  Denies SOB or wheezing   +sore throat   Denies sinus pressure, headaches, rhinorrhea  No fevers, chills, body aches or headaches  Slight nausea on and off but no vomiting or diarrhea  Appetite is decreased although he notes this is improving today      Denies H/o asthma or COPD   Non smoker   No known sick contacts   He did not get the flu vaccine this season and would not like it today       The following portions of the patient's history were reviewed and updated as appropriate: allergies, current medications, past medical history, past social history and problem list     Review of Systems   Constitutional: Positive for appetite change and fatigue  Negative for chills and fever  HENT: Positive for sore throat  Negative for congestion, ear pain, postnasal drip, rhinorrhea, sinus pressure, sinus pain, tinnitus, trouble swallowing and voice change  Eyes: Negative for visual disturbance  Respiratory: Positive for cough  Negative for chest tightness, shortness of breath and wheezing  Cardiovascular: Negative for chest pain, palpitations and leg swelling  Gastrointestinal: Positive for nausea  Negative for abdominal distention, abdominal pain, blood in stool, constipation, diarrhea and vomiting  Genitourinary: Negative for dysuria  Musculoskeletal: Negative for arthralgias and myalgias  Skin: Negative for rash and wound  Neurological: Negative for dizziness, weakness, numbness and headaches  Psychiatric/Behavioral: Negative for sleep disturbance and suicidal ideas  Objective:      /68   Pulse 88   Temp 99 7 °F (37 6 °C) (Tympanic)   Resp 16   Ht 6' (1 829 m)   Wt 93 kg (205 lb)   SpO2 97%   BMI 27 80 kg/m²          Physical Exam   Constitutional: He is oriented to person, place, and time  He appears well-developed and well-nourished  No distress  HENT:   Head: Normocephalic and atraumatic  Right Ear: Hearing, tympanic membrane and ear canal normal    Left Ear: Hearing, tympanic membrane and ear canal normal    Nose: Rhinorrhea present  No mucosal edema  Right sinus exhibits no maxillary sinus tenderness and no frontal sinus tenderness  Left sinus exhibits no maxillary sinus tenderness and no frontal sinus tenderness     Mouth/Throat: Uvula is midline and mucous membranes are normal  Posterior oropharyngeal erythema present  No posterior oropharyngeal edema  Eyes: Pupils are equal, round, and reactive to light  Conjunctivae are normal    Neck: Normal range of motion  Neck supple  No thyromegaly present  Cardiovascular: Normal rate, regular rhythm and normal heart sounds  No murmur heard  No LE edema   Pulmonary/Chest: Effort normal and breath sounds normal  No accessory muscle usage or stridor  No respiratory distress  He has no decreased breath sounds  He has no wheezes  He has no rhonchi  He has no rales  Abdominal: Soft  Bowel sounds are normal  He exhibits no distension  There is no tenderness  Musculoskeletal: Normal range of motion  Lymphadenopathy:     He has no cervical adenopathy  Neurological: He is alert and oriented to person, place, and time  Skin: Skin is warm and dry  He is not diaphoretic  Psychiatric: He has a normal mood and affect

## 2019-12-19 DIAGNOSIS — G43.709 CHRONIC MIGRAINE WITHOUT AURA WITHOUT STATUS MIGRAINOSUS, NOT INTRACTABLE: ICD-10-CM

## 2019-12-19 NOTE — TELEPHONE ENCOUNTER
Medication refill check list    Correct patient? yes   Correct medication name, dose, and pill size? yes   Correct provider? yes   Last and Next appt  scheduled? Yes, last date 11/02/18 & next date 01/21/20   Right pharmacy listed? yes   Correct quantity for 30 or 90 days? yes   Is the patient out of refills? When was it last prescribed? N/A Seen by faculty  Directions match what the patient says they are taking? yes   Enough refills? (none for controlled substances, 1 year for routine medications) yes       Patient has not been seen in a year

## 2019-12-20 ENCOUNTER — TELEPHONE (OUTPATIENT)
Dept: FAMILY MEDICINE CLINIC | Facility: CLINIC | Age: 69
End: 2019-12-20

## 2019-12-20 DIAGNOSIS — R05.9 COUGH: Primary | ICD-10-CM

## 2019-12-20 RX ORDER — SUMATRIPTAN 50 MG/1
TABLET, FILM COATED ORAL
Qty: 9 TABLET | Refills: 0 | Status: SHIPPED | OUTPATIENT
Start: 2019-12-20 | End: 2020-01-20 | Stop reason: SDUPTHER

## 2019-12-20 RX ORDER — PROMETHAZINE HYDROCHLORIDE AND CODEINE PHOSPHATE 6.25; 1 MG/5ML; MG/5ML
5 SYRUP ORAL EVERY 6 HOURS PRN
Qty: 180 ML | Refills: 0 | Status: SHIPPED | OUTPATIENT
Start: 2019-12-20 | End: 2021-05-18 | Stop reason: ALTCHOICE

## 2019-12-20 NOTE — TELEPHONE ENCOUNTER
Patient finished his antibiotic today and is still coughing  Patient is requesting a cough syrup with codeine to [facility]  He can be contacted at [phone]

## 2019-12-20 NOTE — TELEPHONE ENCOUNTER
Please inform patient that prescription was sent to the pharmacy for Promethazine with Codeine cough syrup

## 2020-01-17 ENCOUNTER — TELEPHONE (OUTPATIENT)
Dept: NEUROLOGY | Facility: CLINIC | Age: 70
End: 2020-01-17

## 2020-01-17 NOTE — TELEPHONE ENCOUNTER
LMOM for patient to contact office regarding rescheduling appt with Dr Jorge Luis Serrano  Please transfer call once patient calls back

## 2020-01-20 DIAGNOSIS — G43.709 CHRONIC MIGRAINE WITHOUT AURA WITHOUT STATUS MIGRAINOSUS, NOT INTRACTABLE: ICD-10-CM

## 2020-01-20 RX ORDER — SUMATRIPTAN 50 MG/1
TABLET, FILM COATED ORAL
Qty: 9 TABLET | Refills: 0 | Status: SHIPPED | OUTPATIENT
Start: 2020-01-20 | End: 2020-02-21 | Stop reason: SDUPTHER

## 2020-02-07 NOTE — TELEPHONE ENCOUNTER
Left message to see if patient interested in coming in for sooner appt 2/10/2020 [facility] location

## 2020-02-21 DIAGNOSIS — G43.709 CHRONIC MIGRAINE WITHOUT AURA WITHOUT STATUS MIGRAINOSUS, NOT INTRACTABLE: ICD-10-CM

## 2020-02-21 RX ORDER — SUMATRIPTAN 50 MG/1
TABLET, FILM COATED ORAL
Qty: 9 TABLET | Refills: 1 | Status: SHIPPED | OUTPATIENT
Start: 2020-02-21 | End: 2020-05-04 | Stop reason: SDUPTHER

## 2020-02-21 NOTE — TELEPHONE ENCOUNTER
Pt called Rx line for med refill request for imitrex 50 mg  To be sent to [facility]  Pt last ov 11/2/18 next f/u 9/1/2020

## 2020-02-24 NOTE — TELEPHONE ENCOUNTER
Left message for patient to contact office regarding scheduling f/u appointment in [facility] on hold for 3/2/20

## 2020-03-30 ENCOUNTER — TELEPHONE (OUTPATIENT)
Dept: NEUROLOGY | Facility: CLINIC | Age: 70
End: 2020-03-30

## 2020-03-30 NOTE — TELEPHONE ENCOUNTER
Patient called medication refill line and did not leave voicemail  Called patient back and he stated he needs refill on his Imitrex  Advised patient that on 2/21/2020 Imitrex was filled and given 1 refill  Patient stated he did not know rx was given refill  Patient will call pharmacy

## 2020-05-04 DIAGNOSIS — G43.709 CHRONIC MIGRAINE WITHOUT AURA WITHOUT STATUS MIGRAINOSUS, NOT INTRACTABLE: ICD-10-CM

## 2020-05-04 RX ORDER — SUMATRIPTAN 50 MG/1
TABLET, FILM COATED ORAL
Qty: 9 TABLET | Refills: 1 | Status: SHIPPED | OUTPATIENT
Start: 2020-05-04 | End: 2020-07-07 | Stop reason: SDUPTHER

## 2020-05-26 ENCOUNTER — TELEPHONE (OUTPATIENT)
Dept: NEUROLOGY | Facility: CLINIC | Age: 70
End: 2020-05-26

## 2020-07-07 DIAGNOSIS — G43.709 CHRONIC MIGRAINE WITHOUT AURA WITHOUT STATUS MIGRAINOSUS, NOT INTRACTABLE: ICD-10-CM

## 2020-07-07 RX ORDER — SUMATRIPTAN 50 MG/1
TABLET, FILM COATED ORAL
Qty: 9 TABLET | Refills: 1 | Status: SHIPPED | OUTPATIENT
Start: 2020-07-07 | End: 2020-08-31 | Stop reason: SDUPTHER

## 2020-08-31 ENCOUNTER — OFFICE VISIT (OUTPATIENT)
Dept: NEUROLOGY | Facility: CLINIC | Age: 70
End: 2020-08-31
Payer: COMMERCIAL

## 2020-08-31 VITALS
TEMPERATURE: 97.2 F | WEIGHT: 199 LBS | DIASTOLIC BLOOD PRESSURE: 84 MMHG | HEIGHT: 72 IN | SYSTOLIC BLOOD PRESSURE: 140 MMHG | BODY MASS INDEX: 26.95 KG/M2 | HEART RATE: 88 BPM

## 2020-08-31 DIAGNOSIS — G43.719 INTRACTABLE CHRONIC MIGRAINE WITHOUT AURA AND WITHOUT STATUS MIGRAINOSUS: Primary | ICD-10-CM

## 2020-08-31 DIAGNOSIS — R51.9 WORSENING HEADACHES: ICD-10-CM

## 2020-08-31 PROCEDURE — 1160F RVW MEDS BY RX/DR IN RCRD: CPT | Performed by: PSYCHIATRY & NEUROLOGY

## 2020-08-31 PROCEDURE — 1036F TOBACCO NON-USER: CPT | Performed by: PSYCHIATRY & NEUROLOGY

## 2020-08-31 PROCEDURE — 3008F BODY MASS INDEX DOCD: CPT | Performed by: PSYCHIATRY & NEUROLOGY

## 2020-08-31 PROCEDURE — 3079F DIAST BP 80-89 MM HG: CPT | Performed by: PSYCHIATRY & NEUROLOGY

## 2020-08-31 PROCEDURE — 3077F SYST BP >= 140 MM HG: CPT | Performed by: PSYCHIATRY & NEUROLOGY

## 2020-08-31 PROCEDURE — 99214 OFFICE O/P EST MOD 30 MIN: CPT | Performed by: PSYCHIATRY & NEUROLOGY

## 2020-08-31 RX ORDER — SUMATRIPTAN 50 MG/1
TABLET, FILM COATED ORAL
Qty: 12 TABLET | Refills: 2 | Status: SHIPPED | OUTPATIENT
Start: 2020-08-31 | End: 2020-12-04 | Stop reason: SDUPTHER

## 2020-08-31 RX ORDER — ERENUMAB-AOOE 140 MG/ML
INJECTION, SOLUTION SUBCUTANEOUS
Qty: 1 PEN | Refills: 4 | Status: SHIPPED | OUTPATIENT
Start: 2020-08-31 | End: 2020-12-01

## 2020-08-31 NOTE — PROGRESS NOTES
Patient ID: Kiah Macario is a 81 y o  male  Assessment/Plan:    No problem-specific Assessment & Plan notes found for this encounter  Diagnoses and all orders for this visit:    Intractable chronic migraine without aura and without status migrainosus  -  Preventative:   Erenumab-aooe (Aimovig) 140 MG/ML SOAJ; Administer 1 ml SC in thigh or stomach once every 30 days  -     SUMAtriptan (IMITREX) 50 mg tablet; Take 1 tab at migraine onset, max 2 tabs in 24 hours  Max 2 days a week  -     CT head wo contrast; Future  If Aimovig not helpful will send for Botox authorization  Discussed q3 month commitment and potential a/e including worse headaches and ptosis  - Discussed potential med a/e   - Similar migraines as prior with vision loss but worsening   - Has not had neuroimaging in over a decade per him    Worsening headaches  -     CT head wo contrast; Future     Follow up in two months time or sooner pending clinical course  He prefers [facility] location as he has to take public transportation      Subjective:    HPI    Mr Trina Coelho is a pleasant 80 yo male with HTN, hx tobacco use, seen in follow up for chronic migraines starting in childhood  When last seen tried on propranolol-- states not helpful  Sumatriptan continues to help him significantly abortively  No s/e  He continues to have at least 15/30 migraine headache days which do not allow him to drive or function   He states if he does not catch it on time, he will have the duration for > 4 hours-- typically 24-48 hours  Severity: Severe    "Medications tried and failed: Amitriptyline with excessive fatigue, butalbital not helpful, Depakote not helpful, Topamax not helpful, propranolol- not helpful, has tried Advil/Aleve uses this max once a week for arthritis- does not help with headache  Verapamil-  did not help  Triggers: Bright lights, loud noises, skipping meals  Can wake up in middle of night with migraines but this is not new, takes sumatriptan and goes back to sleep  Denies s/e with Sumatriptan- takes 50 mg and rarely needs to repeat     Denies hx head or neck trauma, depression or anxiety  No family hx brain aneurysms or brain bleeds  Family hx migraines in dad and paternal grandmother  Sister with TBI and secondary epilepsy  Denies hx stroke TIA"  Does not drive due to migraines vision loss    The following portions of the patient's history were reviewed and updated as appropriate: allergies, current medications, past family history, past medical history, past social history, past surgical history and problem list and ROS         Objective:    Blood pressure 140/84, pulse 88, temperature (!) 97 2 °F (36 2 °C), temperature source Temporal, height 6' (1 829 m), weight 90 3 kg (199 lb)  Physical Exam  Vitals signs and nursing note reviewed  Constitutional:       Appearance: He is well-developed  HENT:      Head: Normocephalic and atraumatic  Eyes:      Extraocular Movements: Extraocular movements intact  Conjunctiva/sclera: Conjunctivae normal       Pupils: Pupils are equal, round, and reactive to light  Neck:      Musculoskeletal: Normal range of motion and neck supple  Cardiovascular:      Rate and Rhythm: Normal rate and regular rhythm  Pulmonary:      Effort: Pulmonary effort is normal    Musculoskeletal: Normal range of motion  Neurological:      Mental Status: He is alert  Cranial Nerves: No dysarthria  Coordination: Romberg sign negative  Gait: Gait is intact  Deep Tendon Reflexes: Strength normal       Reflex Scores:       Patellar reflexes are 2+ on the right side and 2+ on the left side  Achilles reflexes are 2+ on the right side and 2+ on the left side  Neurological Exam  Mental Status  Alert  Oriented to person, place, time and situation  Recent and remote memory are intact  no dysarthria present  Language is fluent with no aphasia      Cranial Nerves  CN II: Visual fields full to confrontation  CN III, IV, VI: Extraocular movements intact bilaterally  Pupils equal round and reactive to light bilaterally  CN V: Facial sensation is normal   CN VII: Full and symmetric facial movement  CN VIII: Hearing is normal   CN XI: Shoulder shrug strength is normal   CN XII: Tongue midline without atrophy or fasciculations  Motor   Normal muscle tone  Strength is 5/5 throughout all four extremities  Sensory  Light touch is normal in upper and lower extremities  Vibration is normal in upper and lower extremities  No right-sided hemispatial neglect  No left-sided hemispatial neglect  Reflexes                                           Right                      Left  Patellar                                2+                         2+  Achilles                                2+                         2+    Coordination  Right: Finger-to-nose normal   Left: Finger-to-nose normal     Gait  Casual gait: Wide stance  Normal stride length  Normal gait  Romberg is absent  ROS:    Review of Systems   Constitutional: Negative  Negative for appetite change and fever  HENT: Negative  Negative for hearing loss, tinnitus, trouble swallowing and voice change  Eyes: Negative  Negative for photophobia and pain  Respiratory: Negative  Negative for shortness of breath  Cardiovascular: Negative  Negative for palpitations  Gastrointestinal: Negative  Negative for nausea and vomiting  Endocrine: Negative  Negative for cold intolerance  Genitourinary: Negative  Negative for dysuria, frequency and urgency  Musculoskeletal: Negative  Negative for myalgias and neck pain  Skin: Negative  Negative for rash  Allergic/Immunologic: Negative  Neurological: Negative  Negative for dizziness, tremors, seizures, syncope, facial asymmetry, speech difficulty, weakness, light-headedness, numbness and headaches  Hematological: Negative  Does not bruise/bleed easily  Psychiatric/Behavioral: Negative  Negative for confusion, hallucinations and sleep disturbance

## 2020-11-24 DIAGNOSIS — G43.719 INTRACTABLE CHRONIC MIGRAINE WITHOUT AURA AND WITHOUT STATUS MIGRAINOSUS: ICD-10-CM

## 2020-11-25 ENCOUNTER — VBI (OUTPATIENT)
Dept: ADMINISTRATIVE | Facility: OTHER | Age: 70
End: 2020-11-25

## 2020-12-01 DIAGNOSIS — G43.719 INTRACTABLE CHRONIC MIGRAINE WITHOUT AURA AND WITHOUT STATUS MIGRAINOSUS: Primary | ICD-10-CM

## 2020-12-04 RX ORDER — SUMATRIPTAN 50 MG/1
TABLET, FILM COATED ORAL
Qty: 12 TABLET | Refills: 2 | Status: SHIPPED | OUTPATIENT
Start: 2020-12-04 | End: 2021-03-12 | Stop reason: SDUPTHER

## 2021-02-08 ENCOUNTER — TELEPHONE (OUTPATIENT)
Dept: NEUROLOGY | Facility: CLINIC | Age: 71
End: 2021-02-08

## 2021-02-08 NOTE — TELEPHONE ENCOUNTER
PA needed for Emgality      Submitted on [facility]    KEY [id number]      Awaiting determination

## 2021-02-08 NOTE — TELEPHONE ENCOUNTER
Patient calling requesting refill of sumatriptan  Advised that he was not ready for refill at this time, last fill 12/4/20 with 2 refills  Max use 2 times per week with 2 pills per use  Patient was previously on [address] with side effects of constipation  Patient did not want to start Emgality, but is now agreeable to trying  Emgality requires ELBA Charles [id number]  N [id number]  GRP [id number]  ID [id number]    [facility] secondary  Diana Christian  N [id number]  GRP PACE  ID [id number]    Dr Jorge Luis Serrano, patient is also agreeable to trying Botox  This is in the note as another option for him, as he is not having good results with other medications  Patient has not been on another preventative medication since stopping Aimovig in December other than Magnesium 400mg qd    Please advise if you would like to start Botox with patient at this time, or try Emgality first?

## 2021-03-12 DIAGNOSIS — G43.719 INTRACTABLE CHRONIC MIGRAINE WITHOUT AURA AND WITHOUT STATUS MIGRAINOSUS: ICD-10-CM

## 2021-03-12 NOTE — TELEPHONE ENCOUNTER
Patient calling in requesting refill of Sumatriptan    Order pended below, please sign if agreeable  Thank you!

## 2021-03-15 RX ORDER — SUMATRIPTAN 50 MG/1
TABLET, FILM COATED ORAL
Qty: 12 TABLET | Refills: 2 | Status: SHIPPED | OUTPATIENT
Start: 2021-03-15

## 2021-03-19 ENCOUNTER — HOSPITAL ENCOUNTER (EMERGENCY)
Facility: HOSPITAL | Age: 71
Discharge: HOME | End: 2021-03-20
Attending: EMERGENCY MEDICINE | Admitting: EMERGENCY MEDICINE
Payer: COMMERCIAL

## 2021-03-19 VITALS
HEIGHT: 72 IN | TEMPERATURE: 98 F | SYSTOLIC BLOOD PRESSURE: 167 MMHG | OXYGEN SATURATION: 97 % | HEART RATE: 91 BPM | RESPIRATION RATE: 18 BRPM | WEIGHT: 210 LBS | BODY MASS INDEX: 28.44 KG/M2 | DIASTOLIC BLOOD PRESSURE: 100 MMHG

## 2021-03-19 DIAGNOSIS — K04.7 DENTAL INFECTION: ICD-10-CM

## 2021-03-19 DIAGNOSIS — K08.89 PAIN, DENTAL: Primary | ICD-10-CM

## 2021-03-19 PROCEDURE — 99283 EMERGENCY DEPT VISIT LOW MDM: CPT

## 2021-03-20 PROCEDURE — 64450 NJX AA&/STRD OTHER PN/BRANCH: CPT | Performed by: EMERGENCY MEDICINE

## 2021-03-20 PROCEDURE — 99284 EMERGENCY DEPT VISIT MOD MDM: CPT | Performed by: EMERGENCY MEDICINE

## 2021-03-20 RX ORDER — NAPROXEN 500 MG/1
500 TABLET ORAL 2 TIMES DAILY WITH MEALS
Qty: 14 TABLET | Refills: 0 | Status: SHIPPED | OUTPATIENT
Start: 2021-03-20 | End: 2021-06-14 | Stop reason: ALTCHOICE

## 2021-03-20 RX ORDER — PENICILLIN V POTASSIUM 500 MG/1
500 TABLET ORAL 4 TIMES DAILY
Qty: 28 TABLET | Refills: 0 | Status: SHIPPED | OUTPATIENT
Start: 2021-03-20 | End: 2021-03-27

## 2021-03-20 RX ORDER — BUPIVACAINE HYDROCHLORIDE 5 MG/ML
5 INJECTION, SOLUTION EPIDURAL; INTRACAUDAL ONCE
Status: COMPLETED | OUTPATIENT
Start: 2021-03-20 | End: 2021-03-20

## 2021-03-20 RX ORDER — OXYCODONE HYDROCHLORIDE 5 MG/1
5 TABLET ORAL ONCE
Status: COMPLETED | OUTPATIENT
Start: 2021-03-20 | End: 2021-03-20

## 2021-03-20 RX ORDER — PENICILLIN V POTASSIUM 250 MG/1
500 TABLET ORAL ONCE
Status: COMPLETED | OUTPATIENT
Start: 2021-03-20 | End: 2021-03-20

## 2021-03-20 RX ADMIN — OXYCODONE HYDROCHLORIDE 5 MG: 5 TABLET ORAL at 00:49

## 2021-03-20 RX ADMIN — PENICILLIN V POTASSIUM 500 MG: 250 TABLET, FILM COATED ORAL at 00:49

## 2021-03-20 RX ADMIN — BUPIVACAINE HYDROCHLORIDE 5 ML: 5 INJECTION, SOLUTION EPIDURAL; INTRACAUDAL; PERINEURAL at 00:50

## 2021-03-20 NOTE — ED ATTENDING ATTESTATION
3/19/2021  INasima MD, saw and evaluated the patient  I have discussed the patient with the resident/non-physician practitioner and agree with the resident's/non-physician practitioner's findings, Plan of Care, and MDM as documented in the resident's/non-physician practitioner's note, except where noted  All available labs and Radiology studies were reviewed  I was present for key portions of any procedure(s) performed by the resident/non-physician practitioner and I was immediately available to provide assistance  At this point I agree with the current assessment done in the Emergency Department  I have conducted an independent evaluation of this patient a history and physical is as follows:  Chronic pain in right upper 1st molar  No trismus, fevers, chills, changes in vision, no drainage  On exam, no drainable abscess, floor of mouth is soft, normal neck exam   Impression:  Dental pain    Agree with documentation  ED Course         Critical Care Time  Procedures

## 2021-03-22 NOTE — ED PROVIDER NOTES
History  Chief Complaint   Patient presents with    Dental Pain     pt stated he is having dental pain since yestarday and has progressivley worsened  Pt tried taking tylenol, alive, amd alcohol to help with pain and was unsuccessful  79 yo M with h/o chronic poor dentition presenting for evaluation of right upper dental pain that has been getting progressively worse since yesterday  Describes the pain as severe in nature  Has tried naproxen as well as alcohol to help with his pain and nothing has helped it and he is here seeking pain control  Patient does have a dentist but stating they are out of town and he is unable to get in to see them for 2 months and that the dental clinic has been difficult to get into see in the past  Has not had any fevers or chills, no headaches, sinus pain, neck pain  States he is unsure exactly which tooth is causing the issue  No other complaints today  History provided by:  Patient   used: No    Dental Pain  Associated symptoms: no congestion, no fever, no headaches and no neck pain        Prior to Admission Medications   Prescriptions Last Dose Informant Patient Reported? Taking? Galcanezumab-gnlm 120 MG/ML SOAJ   No No   Sig: Inject 1 ml in each thigh and or stomach for a total of two injections the first time  Then one injection only every thirty days afterward   Magnesium 400 MG TABS  Self No No   Sig: Take 1 tablet (400 mg total) by mouth daily   Naproxen Sodium (ALEVE) 220 MG CAPS  Self Yes No   Sig: Take by mouth   Promethazine-DM (PHENERGAN-DM) 6 25-15 mg/5 mL oral syrup   No No   Sig: Take 5 mL by mouth 4 (four) times a day as needed for cough   Patient not taking: Reported on 8/31/2020   SUMAtriptan (IMITREX) 50 mg tablet   No No   Sig: Take 1 tab at migraine onset, max 2 tabs in 24 hours   Max 2 days a week   albuterol (VENTOLIN HFA) 90 mcg/act inhaler  Self No No   Sig: Inhale 2 puffs every 6 (six) hours as needed for wheezing   Patient not taking: Reported on 2020   promethazine-codeine (PHENERGAN WITH CODEINE) 6 25-10 mg/5 mL syrup   No No   Sig: Take 5 mL by mouth every 6 (six) hours as needed for cough   Patient not taking: Reported on 2020   ranitidine (ZANTAC) 150 mg tablet  Self Yes No   Sig: Take 150 mg by mouth 2 (two) times a day      Facility-Administered Medications: None       Past Medical History:   Diagnosis Date    Arthritis     Heart murmur     No pluerisy    Hypertension     Migraine        History reviewed  No pertinent surgical history  Family History   Problem Relation Age of Onset    Cancer Mother     Migraines Father     Bipolar disorder Brother     Migraines Maternal Grandmother      I have reviewed and agree with the history as documented  E-Cigarette/Vaping     E-Cigarette/Vaping Substances     Social History     Tobacco Use    Smoking status: Former Smoker     Quit date:      Years since quittin 2    Smokeless tobacco: Never Used   Substance Use Topics    Alcohol use: Yes     Comment: Whiskey and beer    Drug use: No        Review of Systems   Constitutional: Negative for chills and fever  HENT: Positive for dental problem  Negative for congestion and rhinorrhea  Eyes: Negative for visual disturbance  Respiratory: Negative for cough and shortness of breath  Cardiovascular: Negative for chest pain and palpitations  Gastrointestinal: Negative for abdominal pain, nausea and vomiting  Genitourinary: Negative for dysuria and hematuria  Musculoskeletal: Negative for neck pain and neck stiffness  Skin: Negative for pallor and rash  Neurological: Negative for dizziness, light-headedness and headaches  Psychiatric/Behavioral: Negative for confusion  The patient is not nervous/anxious          Physical Exam  ED Triage Vitals [21 2350]   Temperature Pulse Respirations Blood Pressure SpO2   98 °F (36 7 °C) 91 18 167/100 97 %      Temp Source Heart Rate Source Patient Position - Orthostatic VS BP Location FiO2 (%)   Tympanic Monitor Sitting Right arm --      Pain Score       7             Orthostatic Vital Signs  Vitals:    03/19/21 2350   BP: 167/100   Pulse: 91   Patient Position - Orthostatic VS: Sitting       Physical Exam  Vitals signs and nursing note reviewed  Constitutional:       General: He is not in acute distress  Appearance: Normal appearance  He is well-developed  He is not ill-appearing or diaphoretic  HENT:      Head: Normocephalic and atraumatic  Right Ear: External ear normal       Left Ear: External ear normal       Nose: Nose normal       Mouth/Throat:      Mouth: Mucous membranes are moist       Pharynx: Oropharynx is clear  No oropharyngeal exudate  Comments: Poor dentition  Pain localized to right upper first molar (#3) which which does not appear to have been filled previously  Surrounding teeth with prior fillings/fractures but are nontender  No visible abscess  Gingiva are mildly erythematous throughout  Eyes:      General:         Right eye: No discharge  Left eye: No discharge  Conjunctiva/sclera: Conjunctivae normal       Pupils: Pupils are equal, round, and reactive to light  Neck:      Musculoskeletal: Normal range of motion  Cardiovascular:      Rate and Rhythm: Normal rate and regular rhythm  Heart sounds: No murmur  Pulmonary:      Effort: Pulmonary effort is normal  No respiratory distress  Breath sounds: Normal breath sounds  No wheezing  Abdominal:      General: Bowel sounds are normal  There is no distension  Palpations: Abdomen is soft  Musculoskeletal: Normal range of motion  General: No deformity  Skin:     General: Skin is warm and dry  Coloration: Skin is not pale  Findings: No rash  Neurological:      Mental Status: He is alert           ED Medications  Medications   penicillin V potassium (VEETID) tablet 500 mg (500 mg Oral Given 3/20/21 0049)   oxyCODONE (ROXICODONE) IR tablet 5 mg (5 mg Oral Given 3/20/21 0049)   bupivacaine (PF) (MARCAINE) 0 5 % injection 5 mL (5 mL Injection Given by Other 3/20/21 0050)       Diagnostic Studies  Results Reviewed     None                 No orders to display         Procedures  Procedures      ED Course               Identification of Seniors at Risk      Most Recent Value   (ISAR) Identification of Seniors at Risk   Before the illness or injury that brought you to the Emergency, did you need someone to help you on a regular basis? 0 Filed at: 03/19/2021 2357   In the last 24 hours, have you needed more help than usual?  0 Filed at: 03/19/2021 2356   Have you been hospitalized for one or more nights during the past 6 months? 0 Filed at: 03/19/2021 2356   In general, do you see well? 1 Filed at: 03/19/2021 2356   In general, do you have serious problems with your memory? 0 Filed at: 03/19/2021 2356   Do you take more than three different medications every day? 1 Filed at: 03/19/2021 2356   ISAR Score  2 Filed at: 03/19/2021 2356                              MDM  Number of Diagnoses or Management Options  Dental infection:   Pain, dental:   Diagnosis management comments: 77 yo M presenting for dental pain due to poor dentition at the right upper first molar, suspect patient may require a root canal in the future  Will treat with penicillin V, antiinflammatories  Patient having severe pain at this time, given dose of oxycodone in the department which patient reports minimal pain relief from  Performed supraperiosteal infiltration with bupivicaine which again patient reported minimal pain relief from  Explained that he will need to f/u with dentistry and continue using NSAIDs in order to get source control and stop the pain and he should f/u either with his own dentist or our dental clinic ASAP  Discharged, ambulatory out of the dept         Disposition  Final diagnoses:   Pain, dental   Dental infection     Time reflects when diagnosis was documented in both MDM as applicable and the Disposition within this note     Time User Action Codes Description Comment    3/20/2021  1:16 AM Shelley Robbins Headings Add [K08 89] Pain, dental     3/20/2021  1:16 AM Shelley Robbins Headings Add [K04 7] Dental infection       ED Disposition     ED Disposition Condition Date/Time Comment    Discharge Good Sat Mar 20, 2021  1:16 AM Estee Devine discharge to home/self care  Follow-up Information     Follow up With Specialties Details Why Contact Info Additional Information    Kvng Klein MD Family Medicine Schedule an appointment as soon as possible for a visit in 2 days For reevaluation as we discussed  [address]   [phone]       [address] [place] [address] 34 [facility] Emergency Department Emergency Medicine Go to  As needed [address]  [address] [facility] 64 [facility], [address]    [address] Adult and [address] [address] [facility] [address]  Schedule an appointment as soon as possible for a visit in 2 days For reevaluation as we discussed   [address]  [phone]           Discharge Medication List as of 3/20/2021  1:52 AM      START taking these medications    Details   naproxen (NAPROSYN) 500 mg tablet Take 1 tablet (500 mg total) by mouth 2 (two) times a day with meals for 7 days, Starting Sat 3/20/2021, Until Sat 3/27/2021, Normal      penicillin V potassium (VEETID) 500 mg tablet Take 1 tablet (500 mg total) by mouth 4 (four) times a day for 7 days, Starting Sat 3/20/2021, Until Sat 3/27/2021, Normal         CONTINUE these medications which have NOT CHANGED    Details   albuterol (VENTOLIN HFA) 90 mcg/act inhaler Inhale 2 puffs every 6 (six) hours as needed for wheezing, Starting Sat 4/27/2019, Normal      Galcanezumab-gnlm 120 MG/ML SOAJ Inject 1 ml in each thigh and or stomach for a total of two injections the first time  Then one injection only every thirty days afterward, Normal      Magnesium 400 MG TABS Take 1 tablet (400 mg total) by mouth daily, Starting Tue 7/24/2018, No Print      Naproxen Sodium (ALEVE) 220 MG CAPS Take by mouth, Historical Med      promethazine-codeine (PHENERGAN WITH CODEINE) 6 25-10 mg/5 mL syrup Take 5 mL by mouth every 6 (six) hours as needed for cough, Starting Fri 12/20/2019, Normal      Promethazine-DM (PHENERGAN-DM) 6 25-15 mg/5 mL oral syrup Take 5 mL by mouth 4 (four) times a day as needed for cough, Starting Mon 12/16/2019, Normal      ranitidine (ZANTAC) 150 mg tablet Take 150 mg by mouth 2 (two) times a day, Historical Med      SUMAtriptan (IMITREX) 50 mg tablet Take 1 tab at migraine onset, max 2 tabs in 24 hours  Max 2 days a week, Normal           No discharge procedures on file  PDMP Review     None           ED Provider  Attending physically available and evaluated Pelon Álvarez I managed the patient along with the ED Attending      Electronically Signed by         Darcy Foreman MD  03/22/21 3677

## 2021-03-29 ENCOUNTER — TELEPHONE (OUTPATIENT)
Dept: NEUROLOGY | Facility: CLINIC | Age: 71
End: 2021-03-29

## 2021-03-29 NOTE — TELEPHONE ENCOUNTER
lvm for patient to reschedule appt from Dr Giovanni Alan for 5/17 - availability today 3/29 with Dr Giovanni Alan in [facility] @ 11:30 - please assist if still available

## 2021-05-03 ENCOUNTER — TELEPHONE (OUTPATIENT)
Dept: NEUROLOGY | Facility: CLINIC | Age: 71
End: 2021-05-03

## 2021-05-03 NOTE — TELEPHONE ENCOUNTER
Called patient LVM for patient to call back to reschedule his appointment due to provider is out of office

## 2021-05-03 NOTE — TELEPHONE ENCOUNTER
Left patient voicemail regarding a mix up in schedules, made pt aware the appointment on May 17 at 1200 with Sebastian Au still remains

## 2021-05-09 ENCOUNTER — HOSPITAL ENCOUNTER (EMERGENCY)
Facility: HOSPITAL | Age: 71
Discharge: HOME | End: 2021-05-10
Attending: EMERGENCY MEDICINE | Admitting: EMERGENCY MEDICINE
Payer: COMMERCIAL

## 2021-05-09 DIAGNOSIS — R51.9 ACUTE NONINTRACTABLE HEADACHE, UNSPECIFIED HEADACHE TYPE: Primary | ICD-10-CM

## 2021-05-09 LAB
ANION GAP SERPL CALCULATED.3IONS-SCNC: 8 MMOL/L (ref 4–13)
BASOPHILS # BLD AUTO: 0.04 THOUSANDS/ΜL (ref 0–0.1)
BASOPHILS NFR BLD AUTO: 0 % (ref 0–1)
BUN SERPL-MCNC: 23 MG/DL (ref 5–25)
CALCIUM SERPL-MCNC: 8.6 MG/DL (ref 8.3–10.1)
CHLORIDE SERPL-SCNC: 103 MMOL/L (ref 100–108)
CO2 SERPL-SCNC: 23 MMOL/L (ref 21–32)
CREAT SERPL-MCNC: 1.1 MG/DL (ref 0.6–1.3)
EOSINOPHIL # BLD AUTO: 0 THOUSAND/ΜL (ref 0–0.61)
EOSINOPHIL NFR BLD AUTO: 0 % (ref 0–6)
ERYTHROCYTE [DISTWIDTH] IN BLOOD BY AUTOMATED COUNT: 12 % (ref 11.6–15.1)
GFR SERPL CREATININE-BSD FRML MDRD: 69 ML/MIN/1.73SQ M
GLUCOSE SERPL-MCNC: 102 MG/DL (ref 65–140)
HCT VFR BLD AUTO: 47.1 % (ref 36.5–49.3)
HGB BLD-MCNC: 16.7 G/DL (ref 12–17)
IMM GRANULOCYTES # BLD AUTO: 0.06 THOUSAND/UL (ref 0–0.2)
IMM GRANULOCYTES NFR BLD AUTO: 0 % (ref 0–2)
LYMPHOCYTES # BLD AUTO: 0.84 THOUSANDS/ΜL (ref 0.6–4.47)
LYMPHOCYTES NFR BLD AUTO: 6 % (ref 14–44)
MCH RBC QN AUTO: 32.1 PG (ref 26.8–34.3)
MCHC RBC AUTO-ENTMCNC: 35.5 G/DL (ref 31.4–37.4)
MCV RBC AUTO: 91 FL (ref 82–98)
MONOCYTES # BLD AUTO: 0.85 THOUSAND/ΜL (ref 0.17–1.22)
MONOCYTES NFR BLD AUTO: 6 % (ref 4–12)
NEUTROPHILS # BLD AUTO: 11.72 THOUSANDS/ΜL (ref 1.85–7.62)
NEUTS SEG NFR BLD AUTO: 88 % (ref 43–75)
NRBC BLD AUTO-RTO: 0 /100 WBCS
PLATELET # BLD AUTO: 142 THOUSANDS/UL (ref 149–390)
PMV BLD AUTO: 10.6 FL (ref 8.9–12.7)
POTASSIUM SERPL-SCNC: 3.6 MMOL/L (ref 3.5–5.3)
RBC # BLD AUTO: 5.2 MILLION/UL (ref 3.88–5.62)
SARS-COV-2 RNA RESP QL NAA+PROBE: NEGATIVE
SODIUM SERPL-SCNC: 134 MMOL/L (ref 136–145)
WBC # BLD AUTO: 13.51 THOUSAND/UL (ref 4.31–10.16)

## 2021-05-09 PROCEDURE — 96361 HYDRATE IV INFUSION ADD-ON: CPT

## 2021-05-09 PROCEDURE — 99284 EMERGENCY DEPT VISIT MOD MDM: CPT | Performed by: EMERGENCY MEDICINE

## 2021-05-09 PROCEDURE — U0005 INFEC AGEN DETEC AMPLI PROBE: HCPCS | Performed by: EMERGENCY MEDICINE

## 2021-05-09 PROCEDURE — 62270 DX LMBR SPI PNXR: CPT | Performed by: EMERGENCY MEDICINE

## 2021-05-09 PROCEDURE — U0003 INFECTIOUS AGENT DETECTION BY NUCLEIC ACID (DNA OR RNA); SEVERE ACUTE RESPIRATORY SYNDROME CORONAVIRUS 2 (SARS-COV-2) (CORONAVIRUS DISEASE [COVID-19]), AMPLIFIED PROBE TECHNIQUE, MAKING USE OF HIGH THROUGHPUT TECHNOLOGIES AS DESCRIBED BY CMS-2020-01-R: HCPCS | Performed by: EMERGENCY MEDICINE

## 2021-05-09 PROCEDURE — 85025 COMPLETE CBC W/AUTO DIFF WBC: CPT | Performed by: EMERGENCY MEDICINE

## 2021-05-09 PROCEDURE — 99284 EMERGENCY DEPT VISIT MOD MDM: CPT

## 2021-05-09 PROCEDURE — 80048 BASIC METABOLIC PNL TOTAL CA: CPT | Performed by: EMERGENCY MEDICINE

## 2021-05-09 PROCEDURE — 36415 COLL VENOUS BLD VENIPUNCTURE: CPT | Performed by: EMERGENCY MEDICINE

## 2021-05-09 PROCEDURE — 96375 TX/PRO/DX INJ NEW DRUG ADDON: CPT

## 2021-05-09 PROCEDURE — 96365 THER/PROPH/DIAG IV INF INIT: CPT

## 2021-05-09 RX ORDER — METOCLOPRAMIDE HYDROCHLORIDE 5 MG/ML
10 INJECTION INTRAMUSCULAR; INTRAVENOUS ONCE
Status: COMPLETED | OUTPATIENT
Start: 2021-05-09 | End: 2021-05-09

## 2021-05-09 RX ORDER — LIDOCAINE HYDROCHLORIDE AND EPINEPHRINE 10; 10 MG/ML; UG/ML
10 INJECTION, SOLUTION INFILTRATION; PERINEURAL ONCE
Status: DISCONTINUED | OUTPATIENT
Start: 2021-05-10 | End: 2021-05-10

## 2021-05-09 RX ORDER — DEXAMETHASONE SODIUM PHOSPHATE 10 MG/ML
10 INJECTION, SOLUTION INTRAMUSCULAR; INTRAVENOUS ONCE
Status: COMPLETED | OUTPATIENT
Start: 2021-05-09 | End: 2021-05-09

## 2021-05-09 RX ORDER — KETOROLAC TROMETHAMINE 30 MG/ML
30 INJECTION, SOLUTION INTRAMUSCULAR; INTRAVENOUS ONCE
Status: COMPLETED | OUTPATIENT
Start: 2021-05-09 | End: 2021-05-09

## 2021-05-09 RX ORDER — LIDOCAINE HYDROCHLORIDE AND EPINEPHRINE 10; 10 MG/ML; UG/ML
1 INJECTION, SOLUTION INFILTRATION; PERINEURAL ONCE
Status: DISCONTINUED | OUTPATIENT
Start: 2021-05-10 | End: 2021-05-10

## 2021-05-09 RX ORDER — DIPHENHYDRAMINE HYDROCHLORIDE 50 MG/ML
25 INJECTION INTRAMUSCULAR; INTRAVENOUS ONCE
Status: COMPLETED | OUTPATIENT
Start: 2021-05-09 | End: 2021-05-09

## 2021-05-09 RX ORDER — LIDOCAINE HYDROCHLORIDE 10 MG/ML
INJECTION, SOLUTION EPIDURAL; INFILTRATION; INTRACAUDAL; PERINEURAL
Status: COMPLETED
Start: 2021-05-09 | End: 2021-05-09

## 2021-05-09 RX ORDER — MAGNESIUM SULFATE HEPTAHYDRATE 40 MG/ML
2 INJECTION, SOLUTION INTRAVENOUS ONCE
Status: COMPLETED | OUTPATIENT
Start: 2021-05-09 | End: 2021-05-09

## 2021-05-09 RX ADMIN — MAGNESIUM SULFATE HEPTAHYDRATE 2 G: 40 INJECTION, SOLUTION INTRAVENOUS at 21:40

## 2021-05-09 RX ADMIN — LIDOCAINE HYDROCHLORIDE 5 ML: 10 INJECTION, SOLUTION EPIDURAL; INFILTRATION; INTRACAUDAL at 23:30

## 2021-05-09 RX ADMIN — KETOROLAC TROMETHAMINE 30 MG: 30 INJECTION, SOLUTION INTRAMUSCULAR at 21:36

## 2021-05-09 RX ADMIN — METOCLOPRAMIDE HYDROCHLORIDE 10 MG: 5 INJECTION INTRAMUSCULAR; INTRAVENOUS at 21:36

## 2021-05-09 RX ADMIN — DIPHENHYDRAMINE HYDROCHLORIDE 25 MG: 50 INJECTION, SOLUTION INTRAMUSCULAR; INTRAVENOUS at 21:36

## 2021-05-09 RX ADMIN — DEXAMETHASONE SODIUM PHOSPHATE 10 MG: 10 INJECTION, SOLUTION INTRAMUSCULAR; INTRAVENOUS at 23:43

## 2021-05-09 RX ADMIN — SODIUM CHLORIDE 1000 ML: 0.9 INJECTION, SOLUTION INTRAVENOUS at 21:40

## 2021-05-09 RX ADMIN — LIDOCAINE HYDROCHLORIDE 5 ML: 10 INJECTION, SOLUTION EPIDURAL; INFILTRATION; INTRACAUDAL; PERINEURAL at 23:30

## 2021-05-10 ENCOUNTER — APPOINTMENT (EMERGENCY)
Dept: RADIOLOGY | Facility: HOSPITAL | Age: 71
End: 2021-05-10
Payer: COMMERCIAL

## 2021-05-10 VITALS
OXYGEN SATURATION: 94 % | RESPIRATION RATE: 16 BRPM | HEART RATE: 80 BPM | TEMPERATURE: 98.7 F | DIASTOLIC BLOOD PRESSURE: 75 MMHG | SYSTOLIC BLOOD PRESSURE: 121 MMHG

## 2021-05-10 LAB
APPEARANCE CSF: CLEAR
GLUCOSE CSF-MCNC: 56 MG/DL (ref 50–80)
GRAM STN SPEC: NORMAL
GRAM STN SPEC: NORMAL
LYMPHOCYTES NFR CSF MANUAL: 40 %
MONOS+MACROS CSF MANUAL: 58 %
NEUTROPHILS NFR CSF MANUAL: 2 %
PROT CSF-MCNC: 46 MG/DL (ref 15–45)
RBC # CSF MANUAL: 22 UL (ref 0–10)
RBC # CSF MANUAL: 7 UL (ref 0–10)
TOTAL CELLS COUNTED BLD: NO
TOTAL CELLS COUNTED SPEC: 60
TUBE # CSF: 4
WBC # CSF AUTO: 4 /UL (ref 0–5)

## 2021-05-10 PROCEDURE — 96361 HYDRATE IV INFUSION ADD-ON: CPT

## 2021-05-10 PROCEDURE — 89051 BODY FLUID CELL COUNT: CPT | Performed by: EMERGENCY MEDICINE

## 2021-05-10 PROCEDURE — 84157 ASSAY OF PROTEIN OTHER: CPT | Performed by: EMERGENCY MEDICINE

## 2021-05-10 PROCEDURE — 96375 TX/PRO/DX INJ NEW DRUG ADDON: CPT

## 2021-05-10 PROCEDURE — 96367 TX/PROPH/DG ADDL SEQ IV INF: CPT

## 2021-05-10 PROCEDURE — 89050 BODY FLUID CELL COUNT: CPT | Performed by: EMERGENCY MEDICINE

## 2021-05-10 PROCEDURE — 70450 CT HEAD/BRAIN W/O DYE: CPT

## 2021-05-10 PROCEDURE — 87070 CULTURE OTHR SPECIMN AEROBIC: CPT | Performed by: EMERGENCY MEDICINE

## 2021-05-10 PROCEDURE — G1004 CDSM NDSC: HCPCS

## 2021-05-10 PROCEDURE — 82945 GLUCOSE OTHER FLUID: CPT | Performed by: EMERGENCY MEDICINE

## 2021-05-10 RX ORDER — MIDAZOLAM HYDROCHLORIDE 2 MG/2ML
2 INJECTION, SOLUTION INTRAMUSCULAR; INTRAVENOUS ONCE
Status: COMPLETED | OUTPATIENT
Start: 2021-05-10 | End: 2021-05-10

## 2021-05-10 RX ORDER — LIDOCAINE HYDROCHLORIDE 10 MG/ML
5 INJECTION, SOLUTION EPIDURAL; INFILTRATION; INTRACAUDAL; PERINEURAL ONCE
Status: COMPLETED | OUTPATIENT
Start: 2021-05-10 | End: 2021-05-09

## 2021-05-10 RX ADMIN — SODIUM CHLORIDE 1000 ML: 0.9 INJECTION, SOLUTION INTRAVENOUS at 01:39

## 2021-05-10 RX ADMIN — VALPROATE SODIUM 1000 MG: 100 INJECTION, SOLUTION INTRAVENOUS at 01:13

## 2021-05-10 RX ADMIN — MIDAZOLAM 2 MG: 1 INJECTION INTRAMUSCULAR; INTRAVENOUS at 00:56

## 2021-05-10 NOTE — ED PROVIDER NOTES
History  Chief Complaint   Patient presents with    Headache - Recurrent or Known Dx Migraines     patient reports headache "all weekend"  States he has hx of migraines and has take 4 imitrex throughout weekend with no relief  Also reports nausea     28-year-old male with history of migraine, hypertension who presents for evaluation of headache  Patient states that his headache began yesterday morning  He has tried taking his Imitrex, Aleve, and Tylenol without relief  He states that this is similar to his previous migraines, however, they are usually relieved with Imitrex  The pain is left-sided  He has associated photophobia and phonophobia  He has had intermittent nausea without vomiting  This is not the worst headache of his life  He endorses having a fever yesterday of 100 4 F which resolved after a few hours  He has not had any antipyretics today  He also endorses a cough  He denies shortness of breath, chest pain, abdominal pain  He has no neck stiffness  He denies weakness, numbness, vision changes  Prior to Admission Medications   Prescriptions Last Dose Informant Patient Reported? Taking? Galcanezumab-gnlm 120 MG/ML SOAJ   No No   Sig: Inject 1 ml in each thigh and or stomach for a total of two injections the first time  Then one injection only every thirty days afterward   Magnesium 400 MG TABS  Self No No   Sig: Take 1 tablet (400 mg total) by mouth daily   Naproxen Sodium (ALEVE) 220 MG CAPS  Self Yes No   Sig: Take by mouth   Promethazine-DM (PHENERGAN-DM) 6 25-15 mg/5 mL oral syrup   No No   Sig: Take 5 mL by mouth 4 (four) times a day as needed for cough   Patient not taking: Reported on 8/31/2020   SUMAtriptan (IMITREX) 50 mg tablet   No No   Sig: Take 1 tab at migraine onset, max 2 tabs in 24 hours   Max 2 days a week   albuterol (VENTOLIN HFA) 90 mcg/act inhaler  Self No No   Sig: Inhale 2 puffs every 6 (six) hours as needed for wheezing   Patient not taking: Reported on 2020   naproxen (NAPROSYN) 500 mg tablet   No No   Sig: Take 1 tablet (500 mg total) by mouth 2 (two) times a day with meals for 7 days   promethazine-codeine (PHENERGAN WITH CODEINE) 6 25-10 mg/5 mL syrup   No No   Sig: Take 5 mL by mouth every 6 (six) hours as needed for cough   Patient not taking: Reported on 2020   ranitidine (ZANTAC) 150 mg tablet  Self Yes No   Sig: Take 150 mg by mouth 2 (two) times a day      Facility-Administered Medications: None       Past Medical History:   Diagnosis Date    Arthritis     Heart murmur     No pluerisy    Hypertension     Migraine        History reviewed  No pertinent surgical history  Family History   Problem Relation Age of Onset    Cancer Mother     Migraines Father     Bipolar disorder Brother     Migraines Maternal Grandmother      I have reviewed and agree with the history as documented  E-Cigarette/Vaping     E-Cigarette/Vaping Substances     Social History     Tobacco Use    Smoking status: Former Smoker     Quit date:      Years since quittin 3    Smokeless tobacco: Never Used   Substance Use Topics    Alcohol use: Yes     Comment: Whiskey and beer    Drug use: No        Review of Systems   Constitutional: Positive for fever  Negative for chills  Eyes: Negative for visual disturbance  Respiratory: Positive for cough  Negative for chest tightness and shortness of breath  Cardiovascular: Negative for chest pain and leg swelling  Gastrointestinal: Negative for abdominal distention, abdominal pain, diarrhea, nausea and vomiting  Genitourinary: Negative for dysuria, flank pain, frequency and urgency  Musculoskeletal: Negative for back pain and gait problem  Skin: Negative for pallor and rash  Neurological: Positive for headaches  Negative for syncope, weakness, light-headedness and numbness  All other systems reviewed and are negative        Physical Exam  ED Triage Vitals   Temperature Pulse Respirations Blood Pressure SpO2   05/09/21 2135 05/09/21 2128 05/09/21 2128 05/09/21 2128 05/09/21 2128   100 1 °F (37 8 °C) 99 18 157/71 95 %      Temp Source Heart Rate Source Patient Position - Orthostatic VS BP Location FiO2 (%)   05/09/21 2135 05/09/21 2128 05/09/21 2128 05/09/21 2128 --   Oral Monitor Lying Right arm       Pain Score       05/09/21 2122       Worst Possible Pain             Orthostatic Vital Signs  Vitals:    05/09/21 2128 05/09/21 2245 05/10/21 0015 05/10/21 0145   BP: 157/71 141/79  121/75   Pulse: 99 82 88 80   Patient Position - Orthostatic VS: Lying Sitting  Lying       Physical Exam  Vitals signs and nursing note reviewed  Constitutional:       General: He is not in acute distress  HENT:      Head: Normocephalic and atraumatic  Nose: Nose normal       Mouth/Throat:      Mouth: Mucous membranes are moist       Pharynx: No oropharyngeal exudate or posterior oropharyngeal erythema  Eyes:      Extraocular Movements: Extraocular movements intact  Pupils: Pupils are equal, round, and reactive to light  Neck:      Musculoskeletal: Normal range of motion and neck supple  No neck rigidity or muscular tenderness  Cardiovascular:      Rate and Rhythm: Normal rate and regular rhythm  Heart sounds: No murmur  No friction rub  No gallop  Pulmonary:      Effort: Pulmonary effort is normal       Breath sounds: Normal breath sounds  No wheezing, rhonchi or rales  Abdominal:      General: Bowel sounds are normal  There is no distension  Palpations: Abdomen is soft  Tenderness: There is no abdominal tenderness  Musculoskeletal: Normal range of motion  General: No swelling or tenderness  Lymphadenopathy:      Cervical: No cervical adenopathy  Skin:     General: Skin is warm and dry  Coloration: Skin is not pale  Findings: No rash  Neurological:      General: No focal deficit present  Mental Status: He is alert and oriented to person, place, and time  Comments: Speech normal, no dysarthria  Cranial nerves 2-12 intact  5/5 strength in bilateral upper and lower extremities  Sensation intact in bilateral upper and lower extremities    Gait normal   Finger-nose normal    Psychiatric:         Behavior: Behavior normal          ED Medications  Medications   diphenhydrAMINE (BENADRYL) injection 25 mg (25 mg Intravenous Given 5/9/21 2136)   metoclopramide (REGLAN) injection 10 mg (10 mg Intravenous Given 5/9/21 2136)   ketorolac (TORADOL) injection 30 mg (30 mg Intravenous Given 5/9/21 2136)   magnesium sulfate 2 g/50 mL IVPB (premix) 2 g (0 g Intravenous Stopped 5/9/21 2249)   sodium chloride 0 9 % bolus 1,000 mL (0 mL Intravenous Stopped 5/9/21 2342)   dexamethasone (PF) (DECADRON) injection 10 mg (10 mg Intravenous Given 5/9/21 2343)   valproate (DEPACON) 1,000 mg in sodium chloride 0 9 % 50 mL for headache (0 g Intravenous Stopped 5/10/21 0130)   midazolam (VERSED) injection 2 mg (2 mg Intravenous Given 5/10/21 0056)   sodium chloride 0 9 % bolus 1,000 mL (0 mL Intravenous Stopped 5/10/21 0300)   lidocaine (PF) (XYLOCAINE-MPF) 1 % injection 5 mL (5 mL Infiltration Given by Other 5/9/21 2330)       Diagnostic Studies  Results Reviewed     Procedure Component Value Units Date/Time    CSF Diff [[id number]] Collected: 05/10/21 0126    Lab Status: Final result Specimen: Cerebrospinal Fluid from Lumbar Puncture Updated: 05/10/21 0637     Total Counted 60     Neutrophils % (CSF) 2 %      Lymphs % CSF 40 %      Monocytes % (CSF) 58 %     CSF, Gram Stain (Tube 3) [[id number]] Collected: 05/10/21 0126    Lab Status: Final result Specimen: Cerebrospinal Fluid from Lumbar Puncture Updated: 05/10/21 0256     Gram Stain Result Rare Mononuclear Cells      No bacteria seen    CSF, White cell count with differential (Tube 4) [[id number]] Collected: 05/10/21 0126    Lab Status: Final result Specimen: Cerebrospinal Fluid from Lumbar Puncture Updated: 05/10/21 0248     Appearance, CSF clear     Tube Number, CSF 4     WBC, CSF 4 /uL      Xanthochromia No    CSF, RBC count (Tube 4) [[id number]]  (Normal) Collected: 05/10/21 0126    Lab Status: Final result Specimen: Cerebrospinal Fluid from Lumbar Puncture Updated: 05/10/21 0248     RBC, CSF 7 uL     CSF, RBC count (Tube 1) [[id number]]  (Abnormal) Collected: 05/10/21 0126    Lab Status: Final result Specimen: Cerebrospinal Fluid from Lumbar Puncture Updated: 05/10/21 0236     RBC, CSF 22 uL     CSF, Glucose (Tube 2) [[id number]]  (Normal) Collected: 05/10/21 0126    Lab Status: Final result Specimen: Cerebrospinal Fluid from Lumbar Puncture Updated: 05/10/21 0153     Glucose, CSF 56 mg/dL     CSF, Total Protein (Tube 2) [[id number]]  (Abnormal) Collected: 05/10/21 0126    Lab Status: Final result Specimen: Cerebrospinal Fluid from Lumbar Puncture Updated: 05/10/21 0153     Protein, CSF 46 mg/dL     CSF, Culture and Gram stain (Tube 3) [[id number]] Collected: 05/10/21 0126    Lab Status:  In process Specimen: Cerebrospinal Fluid from Lumbar Puncture Updated: 05/10/21 0137    Novel Coronavirus (Covid-19),PCR [facility] - 2 Hour Stat [[id number]]  (Normal) Collected: 05/09/21 2153    Lab Status: Final result Specimen: Nares from Nasopharyngeal Swab Updated: 05/09/21 2317     SARS-CoV-2 Negative    Narrative:           Basic metabolic panel [[id number]]  (Abnormal) Collected: 05/09/21 2153    Lab Status: Final result Specimen: Blood from Arm, Left Updated: 05/09/21 2218     Sodium 134 mmol/L      Potassium 3 6 mmol/L      Chloride 103 mmol/L      CO2 23 mmol/L      ANION GAP 8 mmol/L      BUN 23 mg/dL      Creatinine 1 10 mg/dL      Glucose 102 mg/dL      Calcium 8 6 mg/dL      eGFR 69 ml/min/1 73sq m     Narrative:      [place] guidelines for Chronic Kidney Disease (CKD):     Stage 1 with normal or high GFR (GFR > 90 mL/min/1 73 square meters)    Stage 2 Mild CKD (GFR = 60-89 mL/min/1 73 square meters)    Stage 3A Moderate CKD (GFR = 45-59 mL/min/1 73 square meters)    Stage 3B Moderate CKD (GFR = 30-44 mL/min/1 73 square meters)    Stage 4 Severe CKD (GFR = 15-29 mL/min/1 73 square meters)    Stage 5 End Stage CKD (GFR <15 mL/min/1 73 square meters)  Note: GFR calculation is accurate only with a steady state creatinine    CBC and differential [[id number]]  (Abnormal) Collected: 05/09/21 2153    Lab Status: Final result Specimen: Blood from Arm, Left Updated: 05/09/21 2204     WBC 13 51 Thousand/uL      RBC 5 20 Million/uL      Hemoglobin 16 7 g/dL      Hematocrit 47 1 %      MCV 91 fL      MCH 32 1 pg      MCHC 35 5 g/dL      RDW 12 0 %      MPV 10 6 fL      Platelets 756 Thousands/uL      nRBC 0 /100 WBCs      Neutrophils Relative 88 %      Immat GRANS % 0 %      Lymphocytes Relative 6 %      Monocytes Relative 6 %      Eosinophils Relative 0 %      Basophils Relative 0 %      Neutrophils Absolute 11 72 Thousands/µL      Immature Grans Absolute 0 06 Thousand/uL      Lymphocytes Absolute 0 84 Thousands/µL      Monocytes Absolute 0 85 Thousand/µL      Eosinophils Absolute 0 00 Thousand/µL      Basophils Absolute 0 04 Thousands/µL                  CT head without contrast   Final Result by Marie Hart MD (05/10 0038)      No acute intracranial abnormality  Workstation performed: [id number]               Procedures  Lumbar puncture    Date/Time: 5/9/2021 11:22 PM  Performed by: Jacqulynn Canavan, MD  Authorized by: Jacqulynn Canavan, MD   Universal Protocol:  Procedure performed by: (Dr Symone Lopes)  Consent: Written consent obtained    Consent given by: patient  Patient understanding: patient states understanding of the procedure being performed  Patient consent: the patient's understanding of the procedure matches consent given  Site marked: the operative site was marked  Required items: required blood products, implants, devices, and special equipment available  Patient identity confirmed: verbally with patient      Patient location:  ED  Pre-procedure details:     Preparation: Patient was prepped and draped in usual sterile fashion    Indications:     Indications: evaluation for infection and evaluation of opening pressure    Anesthesia (see MAR for exact dosages): Anesthesia method:  Local infiltration    Local anesthetic:  Lidocaine 1% w/o epi  Procedure details:     Lumbar space:  L4-L5 interspace    Patient position:  R lateral decubitus    Equipment: Lumbar puncture kit used      Needle gauge:  22G x 3 5in    Ultrasound guidance: no      Number of attempts:  5 or more  Post-procedure:     Puncture site:  Direct pressure applied    Patient tolerance of procedure: Tolerated well, no immediate complications    Complication comments (i e , not enough fluid obtained, etc ):  Dry LP, no fluid obtained          ED Course               Identification of Seniors at Risk      Most Recent Value   (ISAR) Identification of Seniors at Risk   Before the illness or injury that brought you to the Emergency, did you need someone to help you on a regular basis? 0 Filed at: 05/09/2021 2111   In the last 24 hours, have you needed more help than usual?  0 Filed at: 05/09/2021 2111   Have you been hospitalized for one or more nights during the past 6 months? 0 Filed at: 05/09/2021 2111   In general, do you see well? 1 Filed at: 05/09/2021 2111   In general, do you have serious problems with your memory? 0 Filed at: 05/09/2021 2111   Do you take more than three different medications every day?  0 Filed at: 05/09/2021 2111   ISAR Score  1 Filed at: 05/09/2021 2111                              MDM  Number of Diagnoses or Management Options  Acute nonintractable headache, unspecified headache type: new and requires workup  Diagnosis management comments: 72-year-old male who presents for evaluation of headache  Symptoms are consistent with his previous migraines, however, patient reporting fevers at home yesterday with low-grade temp here    Will treat with migraine cocktail and reassess for the need for further evaluation  Initial migraine cocktail provided patient with little relief  Discussed obtaining neuro imaging as well as lumbar puncture given patient's history of fever  Patient is agreeable to lumbar puncture and imaging  Lumbar puncture unsuccessful after multiple attempts by myself and the attending in the right lateral recumbent position  Will provide additional treatment with Depakote at this time  Signed out pending re-evaluation after treatment, CT, and possible repeat lumbar puncture in the upright position  On chart review, lumbar puncture the upright position was successful  His imaging and CSF studies were normal   Patient was discharged  Amount and/or Complexity of Data Reviewed  Clinical lab tests: ordered and reviewed  Decide to obtain previous medical records or to obtain history from someone other than the patient: yes  Review and summarize past medical records: yes    Risk of Complications, Morbidity, and/or Mortality  Presenting problems: high  Diagnostic procedures: moderate  Management options: moderate    Patient Progress  Patient progress: stable      Disposition  Final diagnoses:   Acute nonintractable headache, unspecified headache type     Time reflects when diagnosis was documented in both MDM as applicable and the Disposition within this note     Time User Action Codes Description Comment    5/10/2021  2:59 AM David Lewis Add [R51 9] Acute nonintractable headache, unspecified headache type       ED Disposition     ED Disposition Condition Date/Time Comment    Discharge Stable Mon May 10, 2021  2:58 AM Letty Garcia discharge to home/self care  Results of completed tests discussed  Return to ER precautions given, verbal and written, and questions answered satisfactorily                  Follow-up Information     Follow up With Specialties Details Why Randi Diaz MD Family Medicine Call in 1 day To recheck symptoms and follow up on your ER visit [address] [facility]  [phone]            Discharge Medication List as of 5/10/2021  3:00 AM      CONTINUE these medications which have NOT CHANGED    Details   albuterol (VENTOLIN HFA) 90 mcg/act inhaler Inhale 2 puffs every 6 (six) hours as needed for wheezing, Starting Sat 4/27/2019, Normal      Galcanezumab-gnlm 120 MG/ML SOAJ Inject 1 ml in each thigh and or stomach for a total of two injections the first time  Then one injection only every thirty days afterward, Normal      Magnesium 400 MG TABS Take 1 tablet (400 mg total) by mouth daily, Starting Tue 7/24/2018, No Print      naproxen (NAPROSYN) 500 mg tablet Take 1 tablet (500 mg total) by mouth 2 (two) times a day with meals for 7 days, Starting Sat 3/20/2021, Until Sat 3/27/2021, Normal      Naproxen Sodium (ALEVE) 220 MG CAPS Take by mouth, Historical Med      promethazine-codeine (PHENERGAN WITH CODEINE) 6 25-10 mg/5 mL syrup Take 5 mL by mouth every 6 (six) hours as needed for cough, Starting Fri 12/20/2019, Normal      Promethazine-DM (PHENERGAN-DM) 6 25-15 mg/5 mL oral syrup Take 5 mL by mouth 4 (four) times a day as needed for cough, Starting Mon 12/16/2019, Normal      ranitidine (ZANTAC) 150 mg tablet Take 150 mg by mouth 2 (two) times a day, Historical Med      SUMAtriptan (IMITREX) 50 mg tablet Take 1 tab at migraine onset, max 2 tabs in 24 hours  Max 2 days a week, Normal           No discharge procedures on file  PDMP Review     None           ED Provider  Attending physically available and evaluated Jean Alex  EDWINA managed the patient along with the ED Attending      Electronically Signed by         Leno Weaver MD  05/10/21 3381

## 2021-05-10 NOTE — ED PROCEDURE NOTE
PROCEDURE  Lumbar puncture    Date/Time: 5/10/2021 1:35 AM  Performed by: Zac Stevens MD  Authorized by: Zac Stevens MD   Universal Protocol:  Consent: Written consent obtained  Risks and benefits: risks, benefits and alternatives were discussed  Consent given by: patient  Time out: Immediately prior to procedure a "time out" was called to verify the correct patient, procedure, equipment, support staff and site/side marked as required  Patient understanding: patient states understanding of the procedure being performed  Patient consent: the patient's understanding of the procedure matches consent given  Procedure consent: procedure consent matches procedure scheduled  Relevant documents: relevant documents present and verified  Test results: test results available and properly labeled  Site marked: the operative site was marked  Radiology Images displayed and confirmed  If images not available, report reviewed: imaging studies available  Required items: required blood products, implants, devices, and special equipment available  Patient identity confirmed: verbally with patient      Patient location:  ED  Pre-procedure details:     Preparation: Patient was prepped and draped in usual sterile fashion    Indications:     Indications: evaluation for infection and evaluation for subarachnoid hemorrhage    Sedation:     Sedation type: Anxiolysis (2 mg versed)  Anesthesia (see MAR for exact dosages):      Anesthesia method:  Local infiltration    Local anesthetic:  Lidocaine 1% WITH epi  Procedure details:     Lumbar space:  L4-L5 interspace    Patient position:  Sitting    Equipment: Lumbar puncture kit used      Needle type:  Marionette Linear point    Ultrasound guidance: no      Number of attempts:  1    Opening pressure (cm H2O): NOT OBTAINED     Closing pressure (cm H2O): NOT OBTAINED     Fluid appearance:  Clear    Tubes of fluid:  4    Total volume (ml):  6  Post-procedure:     Puncture site:  Adhesive bandage applied    Patient tolerance of procedure:   Tolerated well, no immediate complications    Patient instructed to lie flat for one(1) hour post lumbar puncture : Yes           Blake Juárez MD  05/10/21 1431

## 2021-05-10 NOTE — ED ATTENDING ATTESTATION
5/9/2021  Yang BLAND MD, saw and evaluated the patient  I have discussed the patient with the resident/non-physician practitioner and agree with the resident's/non-physician practitioner's findings, Plan of Care, and MDM as documented in the resident's/non-physician practitioner's note, except where noted  All available labs and Radiology studies were reviewed  I was present for key portions of any procedure(s) performed by the resident/non-physician practitioner and I was immediately available to provide assistance  At this point I agree with the current assessment done in the Emergency Department  I have conducted an independent evaluation of this patient a history and physical is as follows:    ED Course    72-year-old male with history of migraine headaches presents with persistent generalized headache symptoms began earlier this week and persistent despite using home rescue medication regimen  Patient also admits to recent fever and cough  Patient admits to some mild neck pain  Vitals reviewed  Heart regular rate rhythm  Lungs clear to auscultation  Abdomen soft nontender nondistended normal bowel sounds  Neuro exam cranial nerves grossly intact strength 5/5 normal speech normal gait no limb or truncal ataxia  Impression:  Headache symptoms appear to be similar to prior headaches in the past   However patient also endorses subjective fevers and cough at home mild neck pain  Will give patient migraine cocktail reassess  Patient reassessed no improvement in symptoms after initial migraine cocktail  Labs reviewed patient has a mild leukocytosis  Discussed with patient patient has never had neuroimaging for his headaches the past will order noncontrast head CT offer patient lumbar puncture given subjective fevers headache and symptoms to evaluate for secondary causes of headache      Initial attempted lumbar puncture in the lateral recumbent position unsuccessful despite multiple attempts by resident myself  Discussion with patient will perform procedure again in sitting position to optimize patient's anatomy for procedure  After discussion with patient patient is amenable for repeat attempt  Please refer to 2nd lumbar puncture note  Approximately 6 cc of clear CSF obtained input in tubes 1 through 4 sent to lab for analysis  Patient did receive 2 mg of Versed for anxiolysis  Valproic acid ordered and hung for persistent headache    CT imaging reviewed no acute disease  Signed out to night team pending re-evaluation of symptoms and CSF results      Critical Care Time  Procedures

## 2021-05-10 NOTE — DISCHARGE INSTRUCTIONS
You were seen today in the Emergency Department for a headache  Your workup included a CT scan, a lumbar puncture for testing of your spinal fluid, and treatment of your symptoms  Please follow up with your Primary Care Provider in the next 1-2 days to recheck your symptoms and to follow up on your visit to the Emergency Department today  Please return to the Emergency Department if you have persistent fevers, chills, chest pain, shortness of breath, are unable to eat or drink, or have any other symptoms that concern you  Please look this over and let your nurse and/or me know if you have any further questions before you leave

## 2021-05-10 NOTE — ED NOTES
RN discussed with patient in regards to proceeding with tap  Patient agreed to moving forward with procedure at this time       Mich Chatterjee RN  05/10/21 9923

## 2021-05-13 LAB — BACTERIA CSF CULT: NO GROWTH

## 2021-05-18 ENCOUNTER — TELEMEDICINE (OUTPATIENT)
Dept: FAMILY MEDICINE CLINIC | Facility: CLINIC | Age: 71
End: 2021-05-18
Payer: COMMERCIAL

## 2021-05-18 DIAGNOSIS — J20.8 ACUTE BRONCHITIS DUE TO OTHER SPECIFIED ORGANISMS: Primary | ICD-10-CM

## 2021-05-18 PROCEDURE — 99213 OFFICE O/P EST LOW 20 MIN: CPT | Performed by: FAMILY MEDICINE

## 2021-05-18 RX ORDER — BENZONATATE 100 MG/1
100 CAPSULE ORAL 3 TIMES DAILY PRN
Qty: 30 CAPSULE | Refills: 0 | Status: SHIPPED | OUTPATIENT
Start: 2021-05-18 | End: 2021-06-14 | Stop reason: ALTCHOICE

## 2021-05-18 RX ORDER — AZITHROMYCIN 250 MG/1
TABLET, FILM COATED ORAL
Qty: 6 TABLET | Refills: 0 | Status: SHIPPED | OUTPATIENT
Start: 2021-05-18 | End: 2021-05-22

## 2021-05-18 NOTE — ASSESSMENT & PLAN NOTE
Start Zithromax for 5 days, Tessalon 100 mg 1 capsule 3 times daily for cough  Recommended to increase fluid intake  Patient was advised to call office if symptoms persist or worsen or go to the emergency room if continues with persistent cough, develops shortness, difficulty breathing, fever

## 2021-05-18 NOTE — PROGRESS NOTES
Virtual Brief Visit    Assessment/Plan:    Problem List Items Addressed This Visit        Respiratory    Acute bronchitis due to other specified organisms - Primary     Start Zithromax for 5 days, Tessalon 100 mg 1 capsule 3 times daily for cough  Recommended to increase fluid intake  Patient was advised to call office if symptoms persist or worsen or go to the emergency room if continues with persistent cough, develops shortness, difficulty breathing, fever  Relevant Medications    azithromycin (ZITHROMAX) 250 mg tablet    benzonatate (TESSALON PERLES) 100 mg capsule          BMI Counseling: There is no height or weight on file to calculate BMI  The BMI is above normal  Nutrition recommendations include encouraging healthy choices of fruits and vegetables, decreasing fast food intake, consuming healthier snacks, limiting drinks that contain sugar, moderation in carbohydrate intake, increasing intake of lean protein, reducing intake of saturated and trans fat and reducing intake of cholesterol  Exercise recommendations include exercising 3-5 times per week  No pharmacotherapy was ordered  Reason for visit is   Chief Complaint   Patient presents with    Virtual Brief Visit     Cold Like Symptoms / Allergies        Encounter provider Segn Tabor MD    Provider located at [address] [place] [id number]    Recent Visits  No visits were found meeting these conditions  Showing recent visits within past 7 days and meeting all other requirements     Today's Visits  Date Type Provider Dept   05/18/21 Telemedicine Seng Tabor MD [id number] today's visits and meeting all other requirements     Future Appointments  No visits were found meeting these conditions     Showing future appointments within next 150 days and meeting all other requirements      It was my intent to perform this visit via video technology but the patient was not able to do a video connection so the visit was completed via audio telephone only  After connecting through telephone, the patient was identified by name and date of birth  Kiah Macario was informed that this is a telemedicine visit and that the visit is being conducted through telephone  My office door was closed  No one else was in the room  He acknowledged consent and understanding of privacy and security of the platform  The patient has agreed to participate and understands he can discontinue the visit at any time  Patient is aware this is a billable service  Subjective    Kiah Macario is a 78 y o  male     HPI     Patient presents for virtual telephone visit c/o non-productive cough, chest congestion, feeling tired for the past week  Denies wheezing, shortness of breath  No fever or chills  Patient was seen in [facility] ER for severe migraine headache on May 9, 2021  Test for COVID-19 was negative  Patient reports no known exposure to COVID-19  Former smoker  Past Medical History:   Diagnosis Date    Arthritis     Heart murmur 1989    No pluerisy    Hypertension     Migraine        No past surgical history on file  Current Outpatient Medications   Medication Sig Dispense Refill    albuterol (VENTOLIN HFA) 90 mcg/act inhaler Inhale 2 puffs every 6 (six) hours as needed for wheezing (Patient not taking: Reported on 8/31/2020) 1 Inhaler 0    azithromycin (ZITHROMAX) 250 mg tablet Take 2 tablets 1 st day, then 1 tablet daily for 4 days, then stop 6 tablet 0    benzonatate (TESSALON PERLES) 100 mg capsule Take 1 capsule (100 mg total) by mouth 3 (three) times a day as needed (for cough) 30 capsule 0    Galcanezumab-gnlm 120 MG/ML SOAJ Inject 1 ml in each thigh and or stomach for a total of two injections the first time   Then one injection only every thirty days afterward 2 pen 3    Magnesium 400 MG TABS Take 1 tablet (400 mg total) by mouth daily 30 tablet 5    naproxen (NAPROSYN) 500 mg tablet Take 1 tablet (500 mg total) by mouth 2 (two) times a day with meals for 7 days 14 tablet 0    Naproxen Sodium (ALEVE) 220 MG CAPS Take by mouth      ranitidine (ZANTAC) 150 mg tablet Take 150 mg by mouth 2 (two) times a day      SUMAtriptan (IMITREX) 50 mg tablet Take 1 tab at migraine onset, max 2 tabs in 24 hours  Max 2 days a week 12 tablet 2     No current facility-administered medications for this visit  Allergies   Allergen Reactions    Aimovig [Erenumab-Aooe] Other (See Comments)     Constipation        Review of Systems   Constitutional: Positive for fatigue  Negative for activity change, appetite change, chills and fever  HENT: Negative for congestion, nosebleeds and sore throat  Eyes: Negative  Respiratory: Positive for cough  Negative for chest tightness, shortness of breath and wheezing  Cardiovascular: Negative for chest pain, palpitations and leg swelling  Gastrointestinal: Negative for abdominal pain, diarrhea, nausea and vomiting  Musculoskeletal: Negative for arthralgias and myalgias  Neurological: Positive for headaches  Negative for dizziness  Hematological: Negative  There were no vitals filed for this visit  I spent 15 minutes directly with the patient during this visit    VIRTUAL VISIT Lars Puente acknowledges that he has consented to an online visit or consultation  He understands that the online visit is based solely on information provided by him, and that, in the absence of a face-to-face physical evaluation by the physician, the diagnosis he receives is both limited and provisional in terms of accuracy and completeness  This is not intended to replace a full medical face-to-face evaluation by the physician  Manjula Ball understands and accepts these terms

## 2021-05-21 ENCOUNTER — RA CDI HCC (OUTPATIENT)
Dept: OTHER | Facility: HOSPITAL | Age: 71
End: 2021-05-21

## 2021-05-22 NOTE — PROGRESS NOTES
[facility]  coding opportunities          Chart reviewed, no opportunity found: CHART REVIEWED, NO OPPORTUNITY FOUND              Patients insurance company: [facility]

## 2021-06-14 ENCOUNTER — OFFICE VISIT (OUTPATIENT)
Dept: FAMILY MEDICINE CLINIC | Facility: CLINIC | Age: 71
End: 2021-06-14
Payer: COMMERCIAL

## 2021-06-14 VITALS
HEIGHT: 70 IN | TEMPERATURE: 97.5 F | WEIGHT: 190 LBS | SYSTOLIC BLOOD PRESSURE: 144 MMHG | HEART RATE: 100 BPM | DIASTOLIC BLOOD PRESSURE: 82 MMHG | BODY MASS INDEX: 27.2 KG/M2 | RESPIRATION RATE: 16 BRPM | OXYGEN SATURATION: 96 %

## 2021-06-14 DIAGNOSIS — I10 ESSENTIAL HYPERTENSION: Primary | ICD-10-CM

## 2021-06-14 DIAGNOSIS — G43.709 CHRONIC MIGRAINE WITHOUT AURA WITHOUT STATUS MIGRAINOSUS, NOT INTRACTABLE: ICD-10-CM

## 2021-06-14 DIAGNOSIS — B35.1 ONYCHOMYCOSIS OF TOENAIL: ICD-10-CM

## 2021-06-14 DIAGNOSIS — K21.9 GASTROESOPHAGEAL REFLUX DISEASE WITHOUT ESOPHAGITIS: ICD-10-CM

## 2021-06-14 DIAGNOSIS — Z11.59 NEED FOR HEPATITIS C SCREENING TEST: ICD-10-CM

## 2021-06-14 DIAGNOSIS — Z12.11 SCREENING FOR COLON CANCER: ICD-10-CM

## 2021-06-14 DIAGNOSIS — R20.0 NUMBNESS IN FEET: ICD-10-CM

## 2021-06-14 DIAGNOSIS — Z00.00 MEDICARE ANNUAL WELLNESS VISIT, SUBSEQUENT: ICD-10-CM

## 2021-06-14 PROBLEM — D69.6 PLATELETS DECREASED (HCC): Status: ACTIVE | Noted: 2021-06-14

## 2021-06-14 PROBLEM — J20.8 ACUTE BRONCHITIS DUE TO OTHER SPECIFIED ORGANISMS: Status: RESOLVED | Noted: 2021-05-18 | Resolved: 2021-06-14

## 2021-06-14 PROCEDURE — 3288F FALL RISK ASSESSMENT DOCD: CPT | Performed by: FAMILY MEDICINE

## 2021-06-14 PROCEDURE — 1160F RVW MEDS BY RX/DR IN RCRD: CPT | Performed by: FAMILY MEDICINE

## 2021-06-14 PROCEDURE — 1125F AMNT PAIN NOTED PAIN PRSNT: CPT | Performed by: FAMILY MEDICINE

## 2021-06-14 PROCEDURE — 3725F SCREEN DEPRESSION PERFORMED: CPT | Performed by: FAMILY MEDICINE

## 2021-06-14 PROCEDURE — 99214 OFFICE O/P EST MOD 30 MIN: CPT | Performed by: FAMILY MEDICINE

## 2021-06-14 PROCEDURE — 3008F BODY MASS INDEX DOCD: CPT | Performed by: FAMILY MEDICINE

## 2021-06-14 PROCEDURE — 1170F FXNL STATUS ASSESSED: CPT | Performed by: FAMILY MEDICINE

## 2021-06-14 PROCEDURE — G0439 PPPS, SUBSEQ VISIT: HCPCS | Performed by: FAMILY MEDICINE

## 2021-06-14 NOTE — PROGRESS NOTES
Chief Complaint   Patient presents with   [facility] Wellness Visit     Subsequent   Follow-up     Routine overdue         Health Maintenance   Topic Date Due    Hepatitis C Screening  Never done    Dental Periodic Exam  Never done    Dental X-Ray: Bitewings  Never done    Dental X-Ray: Full Mouth  Never done    Dental Prophylaxis  Never done    COVID-19 Vaccine (1) Never done    DTaP,Tdap,and Td Vaccines (1 - Tdap) Never done    Colorectal Cancer Screening  Never done    Pneumococcal Vaccine: 65+ Years (1 of 1 - PPSV23) Never done   [facility] Annual Wellness Visit (AWV)  01/28/2020    Influenza Vaccine (Season Ended) 09/01/2021    BMI: Followup Plan  05/18/2022    Fall Risk  06/14/2022    Depression Screening PHQ  06/14/2022    BMI: Adult  06/14/2022    HIB Vaccine  Aged Out    Hepatitis B Vaccine  Aged Out    IPV Vaccine  Aged Out    Hepatitis A Vaccine  Aged Out    Meningococcal ACWY Vaccine  Aged Out    HPV Vaccine  Aged Out        Assessment and Plan:     Problem List Items Addressed This Visit        Digestive    Gastroesophageal reflux disease without esophagitis       Respiratory    Acute bronchitis due to other specified organisms       Cardiovascular and Mediastinum    Chronic migraine without aura or status migrainosus    Relevant Orders    TSH, 3rd generation with Free T4 reflex    Essential hypertension - Primary    Relevant Orders    Comprehensive metabolic panel    Lipid panel    TSH, 3rd generation with Free T4 reflex       Other    Medicare annual wellness visit, subsequent    Class 1 obesity due to excess calories without serious comorbidity with body mass index (BMI) of 31 0 to 31 9 in adult    Platelets decreased ([address] )      Other Visit Diagnoses     Need for hepatitis C screening test        Relevant Orders    Hepatitis C Antibody ([facility])    Screening for colon cancer        Relevant Orders    Cologuard    Numbness in feet        Relevant Orders    Ambulatory referral to Podiatry    Vitamin B12    Onychomycosis of toenail        Relevant Orders    Ambulatory referral to Podiatry           Preventive health issues were discussed with patient, and age appropriate screening tests were ordered as noted in patient's After Visit Summary  Personalized health advice and appropriate referrals for health education or preventive services given if needed, as noted in patient's After Visit Summary  History of Present Illness:     Patient presents for Medicare Annual Wellness visit    Patient Care Team:  Gayle Blair MD as PCP - General (Family Medicine)  Gayle Blair MD as PCP - [address] [place] [address] (RTE)     Problem List:     Patient Active Problem List   Diagnosis    Chronic migraine without aura or status migrainosus    Essential hypertension    Gastroesophageal reflux disease without esophagitis    Sinus tachycardia    Elevated AST (SGOT)    Abnormal EKG    Medicare annual wellness visit, subsequent    Class 1 obesity due to excess calories without serious comorbidity with body mass index (BMI) of 31 0 to 31 9 in adult    Acute bronchitis due to other specified organisms    Platelets [facility])      Past Medical and Surgical History:     Past Medical History:   Diagnosis Date    Arthritis     Heart murmur     No pluerisy    Hypertension     Migraine      History reviewed  No pertinent surgical history     Family History:     Family History   Problem Relation Age of Onset    Cancer Mother     Migraines Father     Bipolar disorder Brother     Migraines Maternal Grandmother       Social History:     Social History     Socioeconomic History    Marital status: Single     Spouse name: None    Number of children: None    Years of education: None    Highest education level: None   Occupational History    None   Tobacco Use    Smoking status: Former Smoker     Quit date:      Years since quittin 4    Smokeless tobacco: Never Used   Substance and Sexual Activity    Alcohol use: Yes     Comment: Whiskey and beer    Drug use: No    Sexual activity: None   Other Topics Concern    None   Social History Narrative    None     Social Determinants of Health     Financial Resource Strain:     Difficulty of Paying Living Expenses:    Food Insecurity:     Worried About Running Out of Food in the Last Year:     Ran Out of Food in the Last Year:    Transportation Needs:     Lack of Transportation (Medical):  Lack of Transportation (Non-Medical):    Physical Activity:     Days of Exercise per Week:     Minutes of Exercise per Session:    Stress:     Feeling of Stress :    Social Connections:     Frequency of Communication with Friends and Family:     Frequency of Social Gatherings with Friends and Family:     Attends Sabianist Services:     Active Member of Clubs or Organizations:     Attends Club or Organization Meetings:     Marital Status:    Intimate Partner Violence:     Fear of Current or Ex-Partner:     Emotionally Abused:     Physically Abused:     Sexually Abused:       Medications and Allergies:     Current Outpatient Medications   Medication Sig Dispense Refill    albuterol (VENTOLIN HFA) 90 mcg/act inhaler Inhale 2 puffs every 6 (six) hours as needed for wheezing (Patient not taking: Reported on 8/31/2020) 1 Inhaler 0    Galcanezumab-gnlm 120 MG/ML SOAJ Inject 1 ml in each thigh and or stomach for a total of two injections the first time  Then one injection only every thirty days afterward 2 pen 3    Magnesium 400 MG TABS Take 1 tablet (400 mg total) by mouth daily 30 tablet 5    Naproxen Sodium (ALEVE) 220 MG CAPS Take by mouth      SUMAtriptan (IMITREX) 50 mg tablet Take 1 tab at migraine onset, max 2 tabs in 24 hours  Max 2 days a week 12 tablet 2     No current facility-administered medications for this visit       Allergies   Allergen Reactions    Aimovig [Erenumab-Aooe] Other (See Comments) Constipation       Immunizations:     Immunization History   Administered Date(s) Administered    Influenza, high dose seasonal 0 7 mL 10/16/2018      Health Maintenance:         Topic Date Due    Hepatitis C Screening  Never done    Colorectal Cancer Screening  Never done         Topic Date Due    COVID-19 Vaccine (1) Never done    DTaP,Tdap,and Td Vaccines (1 - Tdap) Never done    Pneumococcal Vaccine: 65+ Years (1 of 1 - PPSV23) Never done    Influenza Vaccine (Season Ended) 09/01/2021      Medicare Health Risk Assessment:     /82 (BP Location: Left arm, Patient Position: Sitting, Cuff Size: Standard)   Pulse 100   Temp 97 5 °F (36 4 °C) (Temporal)   Resp 16   Ht 5' 9 75" (1 772 m)   Wt 86 2 kg (190 lb)   SpO2 96%   BMI 27 46 kg/m²      Carlos Lazcano is here for his Subsequent Wellness visit  Health Risk Assessment:   Patient rates overall health as good  Patient feels that their physical health rating is same  Patient is satisfied with their life  Eyesight was rated as same  Hearing was rated as same  Patient feels that their emotional and mental health rating is same  Patients states they are never, rarely angry  Patient states they are sometimes unusually tired/fatigued  Pain experienced in the last 7 days has been some  Patient's pain rating has been 2/10  Patient states that he has experienced no weight loss or gain in last 6 months  Depression Screening:   PHQ-2 Score: 0      Fall Risk Screening: In the past year, patient has experienced: no history of falling in past year      Home Safety:  Patient does not have trouble with stairs inside or outside of their home  Patient has working smoke alarms and has working carbon monoxide detector  Home safety hazards include: none  Nutrition:   Current diet is Regular  Medications:   Patient is currently taking over-the-counter supplements  OTC medications include: see medication list  Patient is able to manage medications  Activities of Daily Living (ADLs)/Instrumental Activities of Daily Living (IADLs):   Walk and transfer into and out of bed and chair?: Yes  Dress and groom yourself?: Yes    Bathe or shower yourself?: Yes    Feed yourself? Yes  Manage your money, pay your bills and track your expenses?: Yes  Make your own meals?: Yes    Do your own shopping?: Yes    Previous Hospitalizations:   Any hospitalizations or ED visits within the last 12 months?: Yes    How many hospitalizations have you had in the last year?: 1-2    Advance Care Planning:   Living will: No    Durable POA for healthcare: No    Advanced directive: No    Advanced directive counseling given: Yes    Five wishes given: Yes      Cognitive Screening:   Provider or family/friend/caregiver concerned regarding cognition?: No    PREVENTIVE SCREENINGS      Cardiovascular Screening:    General: Screening Current      Diabetes Screening:     General: Screening Current      Colorectal Cancer Screening:     General: Risks and Benefits Discussed    Due for: Cologuard      Prostate Cancer Screening:    General: Risks and Benefits Discussed    Due for: PSA      Osteoporosis Screening:    General: Screening Not Indicated      Abdominal Aortic Aneurysm (AAA) Screening:    Risk factors include: age between 73-67 yo and tobacco use        General: Risks and Benefits Discussed      Lung Cancer Screening:     General: Screening Not Indicated      Hepatitis C Screening:    General: Risks and Benefits Discussed    Hep C Screening Accepted: Yes      Screening, Brief Intervention, and Referral to Treatment (SBIRT)    Screening  Typical number of drinks in a day: 1  Typical number of drinks in a week: 8  Interpretation: Low risk drinking behavior      Single Item Drug Screening:  How often have you used an illegal drug (including marijuana) or a prescription medication for non-medical reasons in the past year? daily or almost daily    Single Item Drug Screen Score: 4  Interpretation: POSITIVE screen for possible drug use disorder    Drug Abuse Screening Test (DAST-10):  1) Have you used drugs other than those required for medical reasons? Yes    Other Counseling Topics:   Car/seat belt/driving safety, skin self-exam and calcium and vitamin D intake and regular weightbearing exercise         Shy Knutson MD

## 2021-06-14 NOTE — PROGRESS NOTES
Assessment/Plan:    Essential hypertension  Goal for /80  Recommended to follow a low-sodium diet, regular exercise  Chronic migraine without aura or status migrainosus  Encouraged patient to stay well hydrated, avoid migraine triggers  Take Magnesium 400 mg daily for migraine prophylaxis  Patient states that he ran out of Imitrex  Recommended to schedule follow-up visit with neurology DR Steven Pimentel  Gastroesophageal reflux disease without esophagitis  Symptoms improved  Recommended to avoid fried, fatty foods, caffeine  Numbness in feet  R/o peripheral neuropathy  Recommended to decrease alcohol consumption to avoid progression in symptoms  Check Vit B12 level, CMP, TSH  Recommended podiatry evaluation  Onychomycosis of toenail  Schedule appointment with podiatrist Dr Nan Smalls  HM: patient refusing colonoscopy  Will order Cologuard test   Discussed screening for Hep C  Office staff will help patient to schedule Covid vaccination  Schedule follow-up office visit in 6 months  Diagnoses and all orders for this visit:    Essential hypertension  -     Comprehensive metabolic panel; Future  -     Lipid panel; Future  -     TSH, 3rd generation with Free T4 reflex; Future  -     CBC and differential; Future    Chronic migraine without aura without status migrainosus, not intractable  -     TSH, 3rd generation with Free T4 reflex; Future    Gastroesophageal reflux disease without esophagitis  -     CBC and differential; Future    Need for hepatitis C screening test  -     Hepatitis C Antibody ([facility]); Future    Screening for colon cancer  -     Cologuard; Future    Medicare annual wellness visit, subsequent    Numbness in feet  -     Ambulatory referral to Podiatry; Future  -     Vitamin B12; Future    Onychomycosis of toenail  -     Ambulatory referral to Podiatry; Future    Other orders  -     Cancel: Ambulatory referral for colonoscopy;  Future Subjective:      Patient ID: Vanesa Avelar is a 78 y o  male  HPI     Patient presents for follow-up office visit  PMHx: HTN, sinus tachycardia, chronic migraine headaches, GERD, OA  Reviewed current medications, blood test results from May 2021  Creatinine 1 10, glucose 102, potassium 3 6  Chronic migraine headaches - followed by [facility] neurology, was seen by Dr Rossy Grayson    in August 2020  Patient was seen in [facility] ER last month for severemigraine headache  CT head was negative for intracranial abnormality  Patient states that migraine headaches improved in intensity and less frequent  Takes Magnesium 400 mg daily  Denies chest pain, shortness of breath, dizziness  He was treated for acute bronchitis last month, completed antibiotic with Zithromax with improvement in symptoms  GERD - reports no heartburn  He does not take any antiacids currently  Quit smoking 17 years ago  Drinks whiskey 2-3 drinks per day  C/o numbness in feet  Ambulates with a cane  Denies falls  C/o dry scab on right lateral ankle which bleeds easily with minor trauma  Refusing colonoscopy  The following portions of the patient's history were reviewed and updated as appropriate: current medications, past family history, past medical history, past social history, past surgical history and problem list     Review of Systems   Constitutional: Positive for fatigue (mild)  Negative for activity change, appetite change, chills and fever  HENT: Negative for congestion, hearing loss, mouth sores, nosebleeds, sore throat, tinnitus and trouble swallowing  Eyes: Negative for pain, discharge, redness, itching and visual disturbance  Respiratory: Negative for cough, chest tightness, shortness of breath and wheezing  Cardiovascular: Negative for chest pain, palpitations and leg swelling     Gastrointestinal: Negative for abdominal pain, blood in stool, constipation, diarrhea, nausea and vomiting  Genitourinary: Negative for difficulty urinating, dysuria, flank pain, frequency and hematuria  Nocturia    Musculoskeletal: Positive for arthralgias and gait problem (ambulates with a cane)  Negative for back pain, joint swelling and neck pain  Skin: Negative for rash  Neurological: Positive for headaches  Negative for dizziness and syncope  Hematological: Negative for adenopathy  Does not bruise/bleed easily  Psychiatric/Behavioral: Negative for dysphoric mood and sleep disturbance  The patient is not nervous/anxious  Objective:      /82 (BP Location: Left arm, Patient Position: Sitting, Cuff Size: Standard)   Pulse 100   Temp 97 5 °F (36 4 °C) (Temporal)   Resp 16   Ht 5' 9 75" (1 772 m)   Wt 86 2 kg (190 lb)   SpO2 96%   BMI 27 46 kg/m²          Physical Exam  Vitals and nursing note reviewed  Constitutional:       Appearance: Normal appearance  HENT:      Head: Normocephalic and atraumatic  Eyes:      Conjunctiva/sclera: Conjunctivae normal       Pupils: Pupils are equal, round, and reactive to light  Neck:      Vascular: No carotid bruit  Cardiovascular:      Rate and Rhythm: Regular rhythm  Tachycardia present  Heart sounds: No murmur heard  Comments: DP pulses 2 + BL  Pulmonary:      Effort: Pulmonary effort is normal       Breath sounds: Normal breath sounds  Abdominal:      General: There is no distension  Palpations: Abdomen is soft  Tenderness: There is no abdominal tenderness  Musculoskeletal:         General: No swelling, tenderness or deformity  Cervical back: Normal range of motion and neck supple  Right lower leg: No edema  Left lower leg: No edema  Comments: Ambulates with a cane     Skin:     General: Skin is warm and dry  Comments: Venous stasis dermatitis BL LE   Dry scab on right lateral ankle  Thick discolored toenails on both feet     Neurological: General: No focal deficit present  Mental Status: He is alert  Sensory: Sensory deficit (slightly decreased sensation in both feet) present  Motor: No weakness        Coordination: Coordination normal    Psychiatric:         Mood and Affect: Mood normal

## 2021-06-15 ENCOUNTER — TELEPHONE (OUTPATIENT)
Dept: FAMILY MEDICINE CLINIC | Facility: CLINIC | Age: 71
End: 2021-06-15

## 2021-06-15 ENCOUNTER — IMMUNIZATIONS (OUTPATIENT)
Dept: FAMILY MEDICINE CLINIC | Facility: HOSPITAL | Age: 71
End: 2021-06-15

## 2021-06-15 DIAGNOSIS — Z23 ENCOUNTER FOR IMMUNIZATION: Primary | ICD-10-CM

## 2021-06-15 PROCEDURE — 91300 SARS-COV-2 / COVID-19 MRNA VACCINE (PFIZER-BIONTECH) 30 MCG: CPT

## 2021-06-15 PROCEDURE — 0001A SARS-COV-2 / COVID-19 MRNA VACCINE (PFIZER-BIONTECH) 30 MCG: CPT

## 2021-06-15 NOTE — TELEPHONE ENCOUNTER
I called [facility] Neurology and scheduled a follow up for patient in [place], the soonest they had was 9/27/21 at 10am, and it is with Darrell Perea  They added him to the wait list in case anything opens up sooner

## 2021-06-15 NOTE — ASSESSMENT & PLAN NOTE
R/o peripheral neuropathy  Recommended to decrease alcohol consumption to avoid progression in symptoms  Check Vit B12 level, CMP, TSH  Recommended podiatry evaluation

## 2021-06-15 NOTE — ASSESSMENT & PLAN NOTE
Encouraged patient to stay well hydrated, avoid migraine triggers  Take Magnesium 400 mg daily for migraine prophylaxis  Patient states that he ran out of Imitrex  Recommended to schedule follow-up visit with neurology DR Rafael Donis

## 2021-06-24 ENCOUNTER — VBI (OUTPATIENT)
Dept: ADMINISTRATIVE | Facility: OTHER | Age: 71
End: 2021-06-24

## 2021-07-10 ENCOUNTER — IMMUNIZATIONS (OUTPATIENT)
Dept: FAMILY MEDICINE CLINIC | Facility: HOSPITAL | Age: 71
End: 2021-07-10

## 2021-07-10 DIAGNOSIS — Z23 ENCOUNTER FOR IMMUNIZATION: Primary | ICD-10-CM

## 2021-07-10 PROCEDURE — 0002A SARS-COV-2 / COVID-19 MRNA VACCINE (PFIZER-BIONTECH) 30 MCG: CPT

## 2021-07-10 PROCEDURE — 91300 SARS-COV-2 / COVID-19 MRNA VACCINE (PFIZER-BIONTECH) 30 MCG: CPT

## 2021-07-29 ENCOUNTER — VBI (OUTPATIENT)
Dept: ADMINISTRATIVE | Facility: OTHER | Age: 71
End: 2021-07-29

## 2021-09-14 ENCOUNTER — TELEPHONE (OUTPATIENT)
Dept: NEUROLOGY | Facility: CLINIC | Age: 71
End: 2021-09-14

## 2021-09-14 NOTE — TELEPHONE ENCOUNTER
Left message to confirm 9/27 appt and to remind to go for CT or to return call and advise if done elsewhere

## 2021-12-08 ENCOUNTER — RA CDI HCC (OUTPATIENT)
Dept: OTHER | Facility: HOSPITAL | Age: 71
End: 2021-12-08

## 2021-12-14 ENCOUNTER — OFFICE VISIT (OUTPATIENT)
Dept: FAMILY MEDICINE CLINIC | Facility: CLINIC | Age: 71
End: 2021-12-14
Payer: COMMERCIAL

## 2021-12-14 VITALS
OXYGEN SATURATION: 96 % | WEIGHT: 202.2 LBS | HEART RATE: 96 BPM | BODY MASS INDEX: 28.95 KG/M2 | TEMPERATURE: 98.3 F | DIASTOLIC BLOOD PRESSURE: 84 MMHG | HEIGHT: 70 IN | RESPIRATION RATE: 16 BRPM | SYSTOLIC BLOOD PRESSURE: 144 MMHG

## 2021-12-14 DIAGNOSIS — G43.709 CHRONIC MIGRAINE WITHOUT AURA WITHOUT STATUS MIGRAINOSUS, NOT INTRACTABLE: ICD-10-CM

## 2021-12-14 DIAGNOSIS — Z23 NEED FOR INFLUENZA VACCINATION: ICD-10-CM

## 2021-12-14 DIAGNOSIS — I10 ESSENTIAL HYPERTENSION: Primary | ICD-10-CM

## 2021-12-14 PROCEDURE — 1160F RVW MEDS BY RX/DR IN RCRD: CPT | Performed by: FAMILY MEDICINE

## 2021-12-14 PROCEDURE — 3077F SYST BP >= 140 MM HG: CPT | Performed by: FAMILY MEDICINE

## 2021-12-14 PROCEDURE — 1036F TOBACCO NON-USER: CPT | Performed by: FAMILY MEDICINE

## 2021-12-14 PROCEDURE — 99213 OFFICE O/P EST LOW 20 MIN: CPT | Performed by: FAMILY MEDICINE

## 2021-12-14 PROCEDURE — 90662 IIV NO PRSV INCREASED AG IM: CPT

## 2021-12-14 PROCEDURE — G0008 ADMIN INFLUENZA VIRUS VAC: HCPCS

## 2021-12-14 PROCEDURE — 3079F DIAST BP 80-89 MM HG: CPT | Performed by: FAMILY MEDICINE

## 2021-12-14 PROCEDURE — 3008F BODY MASS INDEX DOCD: CPT | Performed by: FAMILY MEDICINE

## 2022-01-25 NOTE — TELEPHONE ENCOUNTER
----- Message from Treasure Otto MD sent at 6/15/2021  1:28 PM EDT -----  Patient was seen in the office yesterday  He asked to help him to set up follow-up appointment with neurology for chronic migraine headaches  He was previously seen by Dr Petra Perry  Case Management Discharge Planning Note    Patient name Yanique Chapman  Location [facility] MRN [id number]  : 1940 Date 2022       Current Admission Date: 2022  Current Admission Diagnosis:Subdural hematoma [facility])   Patient Active Problem List    Diagnosis Date Noted    Moderate protein-calorie malnutrition ([facility] ) 2022    UTI (urinary tract infection) 2021    COVID-19 2021    Multiple falls 12/10/2021    Encephalopathy acute 2021    Urinary retention 2021    Stage 2 chronic kidney disease 2021    Essential hypertension 2021    Depressive disorder 2021    Subdural hematoma ([facility] [address] [facility] ) 2021    Mixed hyperlipidemia 2008      LOS (days): 4  Geometric Mean LOS (GMLOS) (days): 3 80  Days to GMLOS:-0 1     OBJECTIVE:  Risk of Unplanned Readmission Score: 19         Current admission status: Inpatient   Preferred Pharmacy:   [address] [place] [address]  Phone: [phone] Fax: [phone]    Primary Care Provider: No primary care provider on file  Primary Insurance: MEDICARE MISC REPLACEMENT  Secondary Insurance:     DISCHARGE DETAILS:          Pt noted to made [facility]  CM spoke to pt's dtr Kami Patrick  She is in agreement with Hospice consult to discuss levels of hospice and most appropriate placement  CM placed and will follow up      Pt's #

## 2022-05-10 ENCOUNTER — OFFICE VISIT (OUTPATIENT)
Dept: FAMILY MEDICINE CLINIC | Facility: CLINIC | Age: 72
End: 2022-05-10
Payer: COMMERCIAL

## 2022-05-10 VITALS
BODY MASS INDEX: 28.63 KG/M2 | HEIGHT: 70 IN | DIASTOLIC BLOOD PRESSURE: 60 MMHG | SYSTOLIC BLOOD PRESSURE: 102 MMHG | RESPIRATION RATE: 16 BRPM | WEIGHT: 200 LBS | TEMPERATURE: 99.3 F | HEART RATE: 96 BPM

## 2022-05-10 DIAGNOSIS — M25.571 ACUTE RIGHT ANKLE PAIN: Primary | ICD-10-CM

## 2022-05-10 PROCEDURE — 99213 OFFICE O/P EST LOW 20 MIN: CPT | Performed by: FAMILY MEDICINE

## 2022-05-10 PROCEDURE — 1160F RVW MEDS BY RX/DR IN RCRD: CPT | Performed by: FAMILY MEDICINE

## 2022-05-10 PROCEDURE — 3078F DIAST BP <80 MM HG: CPT | Performed by: FAMILY MEDICINE

## 2022-05-10 PROCEDURE — 3074F SYST BP LT 130 MM HG: CPT | Performed by: FAMILY MEDICINE

## 2022-05-10 RX ORDER — SULFAMETHOXAZOLE AND TRIMETHOPRIM 800; 160 MG/1; MG/1
1 TABLET ORAL EVERY 12 HOURS SCHEDULED
Qty: 20 TABLET | Refills: 0 | Status: SHIPPED | OUTPATIENT
Start: 2022-05-10 | End: 2022-05-13 | Stop reason: SINTOL

## 2022-05-10 NOTE — ASSESSMENT & PLAN NOTE
Patient has increased right ankle pain with bleeding and discharge  Possible infection  Will start bactrim ds 1 tab BID for 10 days  Given involvement of the larger ankle area, will obtain ankle xrays    Follow up in 2 weeks as needed or rtc sooner for any worsening symptoms

## 2022-05-10 NOTE — PROGRESS NOTES
Assessment/Plan:    Acute right ankle pain  Patient has increased right ankle pain with bleeding and discharge  Possible infection  Will start bactrim ds 1 tab BID for 10 days  Given involvement of the larger ankle area, will obtain ankle xrays  Follow up in 2 weeks as needed or rtc sooner for any worsening symptoms       Diagnoses and all orders for this visit:    Acute right ankle pain  -     XR ankle 3+ vw right; Future  -     sulfamethoxazole-trimethoprim (BACTRIM DS) 800-160 mg per tablet; Take 1 tablet by mouth every 12 (twelve) hours for 10 days          Subjective:   Chief Complaint   Patient presents with    Wound Infection     right ankle     Health Maintenance   Topic Date Due    Hepatitis C Screening  Never done    DTaP,Tdap,and Td Vaccines (1 - Tdap) Never done    Pneumococcal Vaccine: 65+ Years (1 of 1 - PPSV23) Never done    COVID-19 Vaccine (3 - Booster for Pfizer series) 12/10/2021    BMI: Followup Plan  05/18/2022    Fall Risk  06/14/2022    Depression Screening  06/14/2022    Medicare Annual Wellness Visit (AWV)  06/14/2022    Colorectal Cancer Screening  12/27/2022    BMI: Adult  05/10/2023    Influenza Vaccine  Completed    HIB Vaccine  Aged Out    Hepatitis B Vaccine  Aged Out    IPV Vaccine  Aged Out    Hepatitis A Vaccine  Aged Out    Meningococcal ACWY Vaccine  Aged Out    HPV Vaccine  Aged Out        Patient ID: Frederica Harada is a 73 y o  male  HPI    Patient reports having pain right lateral ankle over the past month or so  Has gotten progressively worse  Started noticing more bleeding and discharged recently as well  Has tried using over-the-counter antibiotic ointment and bandages without any significant improvement  Patient does have history of having trauma to area about 15 years ago  Had a ruptured superficial blood vessel which required cauterization  Since then the right lower extremity has not with the same compared left  Does have some skin changes  Has not recently seen specialist     The following portions of the patient's history were reviewed and updated as appropriate: allergies, current medications, past family history, past medical history, past social history, past surgical history, and problem list     Review of Systems   Constitutional: Negative for chills and fever  HENT: Negative for congestion  Respiratory: Negative for cough and shortness of breath  Cardiovascular: Negative for chest pain and palpitations  Gastrointestinal: Negative for nausea and vomiting  Musculoskeletal: Positive for arthralgias  Skin: Positive for wound  Objective:  /60   Pulse 96   Temp 99 3 °F (37 4 °C) (Tympanic)   Resp 16   Ht 5' 9 75" (1 772 m)   Wt 90 7 kg (200 lb)   BMI 28 90 kg/m²      Physical Exam  Vitals and nursing note reviewed  Constitutional:       General: He is not in acute distress  Cardiovascular:      Rate and Rhythm: Normal rate and regular rhythm  Pulses: Normal pulses  Heart sounds: No murmur heard  Pulmonary:      Effort: Pulmonary effort is normal  No respiratory distress  Breath sounds: No wheezing or rales  Skin:     Comments: 1cm by 0 5 cm ulcerated area located on right lateral ankle  Bleeding with some clear discharge  Neurological:      Mental Status: He is alert  This note has been constructed using a voice recognition system  There may be translation, syntax, or grammatical errors  If you have an questions, please contact the dictating provider

## 2022-05-13 ENCOUNTER — TELEPHONE (OUTPATIENT)
Dept: FAMILY MEDICINE CLINIC | Facility: CLINIC | Age: 72
End: 2022-05-13

## 2022-05-13 DIAGNOSIS — M25.571 ACUTE RIGHT ANKLE PAIN: Primary | ICD-10-CM

## 2022-05-13 RX ORDER — DOXYCYCLINE HYCLATE 100 MG/1
100 CAPSULE ORAL EVERY 12 HOURS SCHEDULED
Qty: 14 CAPSULE | Refills: 0 | Status: SHIPPED | OUTPATIENT
Start: 2022-05-13 | End: 2022-05-20

## 2022-05-13 NOTE — TELEPHONE ENCOUNTER
Called and left Vm to discontinue the bactrim  Will start patient on doxycycline BID for 7 days    Recommend follow up if not feeling better or seeing the podiatrist

## 2022-05-13 NOTE — TELEPHONE ENCOUNTER
Patient ([phone]) called to report reaction to sulfamethoxazole-trimethroprim (Bactrim DS) 800-160-mg per tablet prescribed on 5/10/22  State he has had stomach pains and nausea since begging medication  Last took medication this morning but would like to discontinue and be prescribed an alternative due to side effects  Request script be sent to [facility]

## 2022-05-13 NOTE — TELEPHONE ENCOUNTER
Patient was seen by Dr Carmina Kline on May 10, 2022  Please ask Dr Carmina Kline to address patient's symptoms  Patient should stop Bactrim due to side effects

## 2022-06-10 ENCOUNTER — RA CDI HCC (OUTPATIENT)
Dept: OTHER | Facility: HOSPITAL | Age: 72
End: 2022-06-10

## 2022-06-10 NOTE — PROGRESS NOTES
[facility]  coding opportunities       Chart reviewed, no opportunity found:   [address]        Patients Insurance     Medicare Insurance: [employer]

## 2022-06-11 PROBLEM — M25.571 ACUTE RIGHT ANKLE PAIN: Status: RESOLVED | Noted: 2022-05-10 | Resolved: 2022-06-11

## 2022-06-15 ENCOUNTER — OFFICE VISIT (OUTPATIENT)
Dept: FAMILY MEDICINE CLINIC | Facility: CLINIC | Age: 72
End: 2022-06-15
Payer: COMMERCIAL

## 2022-06-15 VITALS
BODY MASS INDEX: 27.33 KG/M2 | DIASTOLIC BLOOD PRESSURE: 76 MMHG | OXYGEN SATURATION: 98 % | HEIGHT: 71 IN | SYSTOLIC BLOOD PRESSURE: 122 MMHG | RESPIRATION RATE: 16 BRPM | HEART RATE: 86 BPM | TEMPERATURE: 98 F | WEIGHT: 195.2 LBS

## 2022-06-15 DIAGNOSIS — Z12.5 SCREENING FOR PROSTATE CANCER: ICD-10-CM

## 2022-06-15 DIAGNOSIS — I10 ESSENTIAL HYPERTENSION: Primary | ICD-10-CM

## 2022-06-15 DIAGNOSIS — G43.709 CHRONIC MIGRAINE WITHOUT AURA WITHOUT STATUS MIGRAINOSUS, NOT INTRACTABLE: ICD-10-CM

## 2022-06-15 DIAGNOSIS — R20.0 NUMBNESS IN FEET: ICD-10-CM

## 2022-06-15 DIAGNOSIS — S91.001A WOUND OF RIGHT ANKLE, INITIAL ENCOUNTER: ICD-10-CM

## 2022-06-15 DIAGNOSIS — Z00.00 MEDICARE ANNUAL WELLNESS VISIT, SUBSEQUENT: ICD-10-CM

## 2022-06-15 DIAGNOSIS — Z11.59 NEED FOR HEPATITIS C SCREENING TEST: ICD-10-CM

## 2022-06-15 DIAGNOSIS — I83.11 VARICOSE VEINS OF RIGHT LOWER EXTREMITY WITH INFLAMMATION: ICD-10-CM

## 2022-06-15 DIAGNOSIS — R00.0 SINUS TACHYCARDIA: ICD-10-CM

## 2022-06-15 PROCEDURE — 99214 OFFICE O/P EST MOD 30 MIN: CPT | Performed by: FAMILY MEDICINE

## 2022-06-15 PROCEDURE — 3008F BODY MASS INDEX DOCD: CPT | Performed by: FAMILY MEDICINE

## 2022-06-15 PROCEDURE — 1125F AMNT PAIN NOTED PAIN PRSNT: CPT | Performed by: FAMILY MEDICINE

## 2022-06-15 PROCEDURE — 3078F DIAST BP <80 MM HG: CPT | Performed by: FAMILY MEDICINE

## 2022-06-15 PROCEDURE — 1170F FXNL STATUS ASSESSED: CPT | Performed by: FAMILY MEDICINE

## 2022-06-15 PROCEDURE — 1036F TOBACCO NON-USER: CPT | Performed by: FAMILY MEDICINE

## 2022-06-15 PROCEDURE — 3288F FALL RISK ASSESSMENT DOCD: CPT | Performed by: FAMILY MEDICINE

## 2022-06-15 PROCEDURE — 1160F RVW MEDS BY RX/DR IN RCRD: CPT | Performed by: FAMILY MEDICINE

## 2022-06-15 PROCEDURE — 3725F SCREEN DEPRESSION PERFORMED: CPT | Performed by: FAMILY MEDICINE

## 2022-06-15 PROCEDURE — 3074F SYST BP LT 130 MM HG: CPT | Performed by: FAMILY MEDICINE

## 2022-06-15 NOTE — ASSESSMENT & PLAN NOTE
Patient has poorly healing wound of right lateral ankle  R/o chronic venous stasis ulcer  Referred to wound care for further evaluation and treatment

## 2022-06-15 NOTE — PATIENT INSTRUCTIONS

## 2022-06-15 NOTE — ASSESSMENT & PLAN NOTE
Patient reports significant improvement in symptoms  Continue Magnesium 400 mg daily for migraine prophylaxis  Avoid migraine triggers

## 2022-06-15 NOTE — PROGRESS NOTES
Chief Complaint   Patient presents with    Medicare Wellness Visit    Follow-up     6 month      Health Maintenance   Topic Date Due    Hepatitis C Screening  Never done    DTaP,Tdap,and Td Vaccines (1 - Tdap) Never done    Pneumococcal Vaccine: 65+ Years (1 - PCV) Never done    COVID-19 Vaccine (3 - Booster for Pfizer series) 12/10/2021    BMI: Followup Plan  05/18/2022    Fall Risk  06/14/2022    Depression Screening  06/14/2022    Medicare Annual Wellness Visit (AWV)  06/14/2022    Colorectal Cancer Screening  12/27/2022    BMI: Adult  05/10/2023    Influenza Vaccine  Completed    HIB Vaccine  Aged Out    Hepatitis B Vaccine  Aged Out    IPV Vaccine  Aged Out    Hepatitis A Vaccine  Aged Out    Meningococcal ACWY Vaccine  Aged Out    HPV Vaccine  Aged Out        Assessment and Plan:     Problem List Items Addressed This Visit        Cardiovascular and Mediastinum    Chronic migraine without aura or status migrainosus    Essential hypertension - Primary       Other    Sinus tachycardia    Medicare annual wellness visit, subsequent    Numbness in feet      Other Visit Diagnoses     Screening for prostate cancer            BMI Counseling: Body mass index is 27 22 kg/m²  The BMI is above normal  Nutrition recommendations include decreasing portion sizes, encouraging healthy choices of fruits and vegetables, decreasing fast food intake, consuming healthier snacks, limiting drinks that contain sugar, moderation in carbohydrate intake, increasing intake of lean protein, reducing intake of saturated and trans fat and reducing intake of cholesterol  Exercise recommendations include exercising 3-5 times per week  No pharmacotherapy was ordered  Rationale for BMI follow-up plan is due to patient being overweight or obese  Depression Screening and Follow-up Plan: Patient was screened for depression during today's encounter  They screened negative with a PHQ-2 score of 0        Preventive health issues were discussed with patient, and age appropriate screening tests were ordered as noted in patient's After Visit Summary  Personalized health advice and appropriate referrals for health education or preventive services given if needed, as noted in patient's After Visit Summary  History of Present Illness:     Patient presents for a Medicare Wellness Visit    HPI   Patient Care Team:  Charly Gutierrez MD as PCP - General (Family Medicine)  Charly Gutierrez MD as PCP - [address] [place] [address] (RTE)  Charly Gutierrez MD as PCP - [facility]CP-[facility] (RTE)     Review of Systems:     Review of Systems     Problem List:     Patient Active Problem List   Diagnosis    Chronic migraine without aura or status migrainosus    Essential hypertension    Gastroesophageal reflux disease without esophagitis    Sinus tachycardia    Elevated AST (SGOT)    Abnormal EKG    Medicare annual wellness visit, subsequent    Class 1 obesity due to excess calories without serious comorbidity with body mass index (BMI) of 31 0 to 31 9 in adult    Platelets decreased ([address] )    Numbness in feet    Onychomycosis of toenail      Past Medical and Surgical History:     Past Medical History:   Diagnosis Date    Arthritis     Heart murmur     No pluerisy    Hypertension     Migraine      No past surgical history on file     Family History:     Family History   Problem Relation Age of Onset    Cancer Mother     Migraines Father     Bipolar disorder Brother     Migraines Maternal Grandmother       Social History:     Social History     Socioeconomic History    Marital status: Single     Spouse name: Not on file    Number of children: Not on file    Years of education: Not on file    Highest education level: Not on file   Occupational History    Not on file   Tobacco Use    Smoking status: Former Smoker     Quit date:      Years since quittin 4    Smokeless tobacco: Never Used   Substance and Sexual Activity    Alcohol use: Yes     Comment: Whiskey and beer    Drug use: No    Sexual activity: Not on file   Other Topics Concern    Not on file   Social History Narrative    Not on file     Social Determinants of Health     Financial Resource Strain: Not on file   Food Insecurity: Not on file   Transportation Needs: Not on file   Physical Activity: Not on file   Stress: Not on file   Social Connections: Not on file   Intimate Partner Violence: Not on file   Housing Stability: Not on file      Medications and Allergies:     Current Outpatient Medications   Medication Sig Dispense Refill    albuterol (VENTOLIN HFA) 90 mcg/act inhaler Inhale 2 puffs every 6 (six) hours as needed for wheezing 1 Inhaler 0    Magnesium 400 MG TABS Take 1 tablet (400 mg total) by mouth daily 30 tablet 5    Naproxen Sodium 220 MG CAPS Take by mouth      Galcanezumab-gnlm 120 MG/ML SOAJ Inject 1 ml in each thigh and or stomach for a total of two injections the first time  Then one injection only every thirty days afterward (Patient not taking: Reported on 6/15/2022) 2 pen 3    SUMAtriptan (IMITREX) 50 mg tablet Take 1 tab at migraine onset, max 2 tabs in 24 hours  Max 2 days a week (Patient not taking: Reported on 6/15/2022) 12 tablet 2     No current facility-administered medications for this visit       Allergies   Allergen Reactions    Aimovig [Erenumab-Aooe] Other (See Comments)     Constipation       Immunizations:     Immunization History   Administered Date(s) Administered    COVID-19 PFIZER VACCINE 0 3 ML IM 06/15/2021, 07/10/2021    Influenza, high dose seasonal 0 7 mL 10/16/2018, 12/14/2021      Health Maintenance:         Topic Date Due    Hepatitis C Screening  Never done    Colorectal Cancer Screening  12/27/2022         Topic Date Due    DTaP,Tdap,and Td Vaccines (1 - Tdap) Never done    Pneumococcal Vaccine: 65+ Years (1 - PCV) Never done    COVID-19 Vaccine (3 - Booster for Delphix series) 12/10/2021      Medicare Screening Tests and Risk Assessments:     Luciano Meng is here for his Subsequent Wellness visit  Health Risk Assessment:   Patient rates overall health as good  Patient feels that their physical health rating is slightly better  Patient is very satisfied with their life  Eyesight was rated as same  Hearing was rated as same  Patient feels that their emotional and mental health rating is slightly better  Patients states they are never, rarely angry  Patient states they are never, rarely unusually tired/fatigued  Pain experienced in the last 7 days has been a lot  Patient's pain rating has been 5/10  Patient states that he has experienced no weight loss or gain in last 6 months  Depression Screening:   PHQ-2 Score: 0      Fall Risk Screening: In the past year, patient has experienced: no history of falling in past year      Home Safety:  Patient does not have trouble with stairs inside or outside of their home  Patient has working smoke alarms and has working carbon monoxide detector  Home safety hazards include: none  Nutrition:   Current diet is Regular  Medications:   Patient is not currently taking any over-the-counter supplements  Patient is able to manage medications  Activities of Daily Living (ADLs)/Instrumental Activities of Daily Living (IADLs):   Walk and transfer into and out of bed and chair?: Yes  Dress and groom yourself?: Yes    Bathe or shower yourself?: Yes    Feed yourself?  Yes  Do your laundry/housekeeping?: Yes  Manage your money, pay your bills and track your expenses?: Yes  Make your own meals?: Yes    Do your own shopping?: Yes    Previous Hospitalizations:   Any hospitalizations or ED visits within the last 12 months?: No      Advance Care Planning:   Living will: No    Durable POA for healthcare: No    Advanced directive: No    Advanced directive counseling given: Yes    Five wishes given: Yes      Cognitive Screening:   Provider or family/friend/caregiver concerned regarding cognition?: No    PREVENTIVE SCREENINGS      Cardiovascular Screening:    General: Screening Current      Diabetes Screening:     General: Risks and Benefits Discussed    Due for: Blood Glucose      Colorectal Cancer Screening:     General: Screening Current      Prostate Cancer Screening:    General: Risks and Benefits Discussed    Due for: PSA      Osteoporosis Screening:    General: Risks and Benefits Discussed and Screening Not Indicated      Abdominal Aortic Aneurysm (AAA) Screening:    Risk factors include: age between 73-69 yo and tobacco use        General: Risks and Benefits Discussed      Lung Cancer Screening:     General: Screening Not Indicated      Hepatitis C Screening:    General: Risks and Benefits Discussed    Hep C Screening Accepted: Yes      Screening, Brief Intervention, and Referral to Treatment (SBIRT)    Screening  Typical number of drinks in a day: 0  Typical number of drinks in a week: 0  Interpretation: Low risk drinking behavior  Single Item Drug Screening:  How often have you used an illegal drug (including marijuana) or a prescription medication for non-medical reasons in the past year? never    Single Item Drug Screen Score: 0  Interpretation: Negative screen for possible drug use disorder    Other Counseling Topics:   Car/seat belt/driving safety, skin self-exam and calcium and vitamin D intake and regular weightbearing exercise       No exam data present     Physical Exam:     Pulse 86   Temp 98 °F (36 7 °C)   Resp 16   Ht 5' 11" (1 803 m)   Wt 88 5 kg (195 lb 3 2 oz)   SpO2 98%   BMI 27 22 kg/m²     Physical Exam     Estephanie Lemon MD

## 2022-06-15 NOTE — PROGRESS NOTES
Assessment/Plan:    Essential hypertension  BP remains stable  Follow a low sodium diet, regular exercise  Check labs  Sinus tachycardia  Stable  Recommended to stay well hydrated, avoid caffeine  Chronic migraine without aura or status migrainosus  Patient reports significant improvement in symptoms  Continue Magnesium 400 mg daily for migraine prophylaxis  Avoid migraine triggers  Numbness in feet  Symptoms are likely related to peripheral neuropathy  Check CMP, Vit B12, TSH  Wound of right ankle  Patient has poorly healing wound of right lateral ankle  R/o chronic venous stasis ulcer  Referred to wound care for further evaluation and treatment  Varicose 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  Recommended vascular surgeon evaluation  HM: will check PSA for prostate cancer screening  Recommended Prevnar 20 vaccination  Schedule follow-up office visit in 6 months  Diagnoses and all orders for this visit:    Essential hypertension  -     CBC and differential; Future  -     Comprehensive metabolic panel; Future  -     Lipid panel; Future    Sinus tachycardia  -     Comprehensive metabolic panel; Future    Chronic migraine without aura without status migrainosus, not intractable  -     CBC and differential; Future    Numbness in feet  -     CBC and differential; Future  -     Comprehensive metabolic panel; Future  -     TSH, 3rd generation with Free T4 reflex; Future  -     Vitamin B12; Future    Medicare annual wellness visit, subsequent    Wound of right ankle, initial encounter  -     Ambulatory Referral to Wound Care; Future    Varicose 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  -     Ambulatory Referral to Vascular Surgery; Future    Screening for prostate cancer  -     PSA, Total Screen; Future    Need for hepatitis C screening test  -     Hepatitis C antibody;  Future    Need for pneumococcal vaccination  -     Pneumococcal Conjugate Vaccine 20-valent (Pcv20)          Subjective:      Patient ID: Radha Mata is a 73 y o  male  HPI     Patient presents for 6 month follow-up visit  PMHx: HTN, sinus tachycardia, chronic migraine headache, GERD, OA, varicose veins  Reviewed current medications  Patient did not get blood work done is ordered in June 2021  HTN - reports no chest pain, shortness of breath, dizziness  Blood pressure remains stable  Patient does not take any blood pressure medication currently  Migraine headaches -patient reports significant improvement in migraine headaches since he started taking Magnesium 400 mg daily  C/o nonhealing right ankle wound for the past month  He was seen by Dr Trecia Lennox I May,  started on antibiotic therapy with Bactrim DS  Stopped taking antibiotic due to significant GI side effects  He was seen by podiatry Dr Rex Schlatter who recommended to schedule further evaluation either by wound care or vascular surgeon  Patient has extensive varicose veins of the right lower extremity  He has not been using compression stockings due to nonhealing wound over lateral aspect of right ankle  C/o numbness in feet  Quit smoking 17 years ago  Drinks whiskey 2-3 drinks per day  Declined colonoscopy, had negative IFOBT in December 2021        The following portions of the patient's history were reviewed and updated as appropriate: allergies, current medications, past family history, past social history, past surgical history and problem list     Review of Systems      Objective:      /76 (BP Location: Left arm, Patient Position: Sitting)   Pulse 86   Temp 98 °F (36 7 °C)   Resp 16   Ht 5' 11" (1 803 m)   Wt 88 5 kg (195 lb 3 2 oz)   SpO2 98%   BMI 27 22 kg/m²          Physical Exam

## 2022-08-18 ENCOUNTER — OFFICE VISIT (OUTPATIENT)
Dept: WOUND CARE | Facility: HOSPITAL | Age: 72
End: 2022-08-18
Payer: COMMERCIAL

## 2022-08-18 ENCOUNTER — TELEPHONE (OUTPATIENT)
Dept: WOUND CARE | Facility: HOSPITAL | Age: 72
End: 2022-08-18

## 2022-08-18 VITALS
DIASTOLIC BLOOD PRESSURE: 77 MMHG | TEMPERATURE: 97.6 F | HEART RATE: 66 BPM | SYSTOLIC BLOOD PRESSURE: 136 MMHG | BODY MASS INDEX: 27.3 KG/M2 | WEIGHT: 195 LBS | RESPIRATION RATE: 16 BRPM | HEIGHT: 71 IN

## 2022-08-18 DIAGNOSIS — L97.919 CHRONIC VENOUS HYPERTENSION (IDIOPATHIC) WITH ULCER OF RIGHT LOWER EXTREMITY (HCC): Primary | ICD-10-CM

## 2022-08-18 DIAGNOSIS — I87.311 CHRONIC VENOUS HYPERTENSION (IDIOPATHIC) WITH ULCER OF RIGHT LOWER EXTREMITY (HCC): Primary | ICD-10-CM

## 2022-08-18 DIAGNOSIS — I87.2 VENOUS INSUFFICIENCY OF BOTH LOWER EXTREMITIES: ICD-10-CM

## 2022-08-18 PROCEDURE — 99203 OFFICE O/P NEW LOW 30 MIN: CPT | Performed by: PODIATRIST

## 2022-08-18 PROCEDURE — 97597 DBRDMT OPN WND 1ST 20 CM/<: CPT | Performed by: PODIATRIST

## 2022-08-18 PROCEDURE — 99213 OFFICE O/P EST LOW 20 MIN: CPT | Performed by: PODIATRIST

## 2022-08-18 RX ORDER — LIDOCAINE 40 MG/G
CREAM TOPICAL ONCE
Status: COMPLETED | OUTPATIENT
Start: 2022-08-18 | End: 2022-08-18

## 2022-08-18 RX ADMIN — LIDOCAINE: 40 CREAM TOPICAL at 10:14

## 2022-08-18 NOTE — PROGRESS NOTES
Patient ID: Jazmine Maria is a 73 y o  male Date of Birth 1950     Chief Complaint  Chief Complaint   Patient presents with    New Patient Visit     Right ankle wound          Allergies  Aimovig [erenumab-aooe] and Sulfa antibiotics    Assessment:    No problem-specific Assessment & Plan notes found for this encounter  Diagnoses and all orders for this visit:    Chronic venous hypertension (idiopathic) with ulcer of right lower extremity (HCC)  -     Wound cleansing and dressings; Future  -     Wound compression and edema control; Future  -     Wound miscellaneous orders; Future  -     Debridement  -     lidocaine (LMX) 4 % cream    Venous insufficiency of both lower extremities  -     lidocaine (LMX) 4 % cream          Debridement   Wound 08/18/22 Venous Ulcer Ankle Anterior;Right    Universal Protocol:  Consent: Verbal consent obtained  Risks and benefits: risks, benefits and alternatives were discussed  Consent given by: patient  Timeout called at: 8/18/2022 9:55 AM   Patient understanding: patient states understanding of the procedure being performed  Patient identity confirmed: verbally with patient      Performed by: physician  Debridement type: selective  Pain control: lidocaine 4%  Post-debridement measurements  Length (cm): 3 7  Width (cm): 4 2  Depth (cm): 0 1  Percent debrided: 100%  Surface Area (cm^2): 15 54  Area debrided (cm^2): 15 54  Volume (cm^3): 1 55  Devitalized tissue debrided: biofilm, fibrin and slough  Instrument(s) utilized: blade  Bleeding: small  Hemostasis obtained with: pressure  Procedural pain (0-10): 0  Post-procedural pain: 0   Response to treatment: procedure was tolerated well          Plan:  1  Reviewed medical records including recent labs  Reviewed the note from PCP  Patient was counseled and educated on the condition and the diagnosis  2  The diagnosis, treatment options and prognosis were discussed with the patient  3  He has VSU on right ankle    Instructed compression, elevation, and low sodium diet  4  Treat him with serial debridement  Local care with silver alginate and DSD  Compression with Spandigrip X 2   5  Consider compression therapy depending on the progress  6  Patient will return in 2 weeks for re-evaluation  Wound 08/18/22 Venous Ulcer Ankle Anterior;Right (Active)   Wound Image Images linked 08/18/22 0923   Wound Description Slough; Yellow;Brown;Black;Eschar;Pink 08/18/22 0928   Rose Mary-wound Assessment Dry;Erythema;Edema; Yellow-brown 08/18/22 0928   Wound Length (cm) 3 7 cm 08/18/22 0928   Wound Width (cm) 4 2 cm 08/18/22 0928   Wound Depth (cm) 0 1 cm 08/18/22 0928   Wound Surface Area (cm^2) 15 54 cm^2 08/18/22 0928   Wound Volume (cm^3) 1 554 cm^3 08/18/22 0928   Calculated Wound Volume (cm^3) 1 55 cm^3 08/18/22 0928   Drainage Amount Moderate 08/18/22 0928   Drainage Description Serosanguineous 08/18/22 0928   Non-staged Wound Description Full thickness 08/18/22 0928       Wound 08/18/22 Venous Ulcer Ankle Anterior;Right (Active)   Date First Assessed/Time First Assessed: 08/18/22 0922   Primary Wound Type: Venous Ulcer  Location: Ankle  Wound Location Orientation: Anterior;Right       Subjective:        DEMETRIA  Valencia Stroud was referred to wound center for treatment of right ankle ulcer  He presents with chief complaint of right ankle ulcer today  It has been draining for 1+ month now  It started with small wounds  Then, it has been getting larger  Tender to touch  He has skin discoloration on his lower legs for 10+ years  He has chronic swelling in his legs as well  No history of diabetes  No significant numbness in his feet  He walks with a cane          The following portions of the patient's history were reviewed and updated as appropriate: allergies, current medications, past family history, past medical history, past social history, past surgical history and problem list       PAST MEDICAL HISTORY:  Past Medical History: Diagnosis Date    Arthritis     Heart murmur     No pluerisy    Hypertension     Migraine        PAST SURGICAL HISTORY:  No past surgical history on file  ALLERGIES:  Aimovig [erenumab-aooe] and Sulfa antibiotics    MEDICATIONS:  Current Outpatient Medications   Medication Sig Dispense Refill    albuterol (VENTOLIN HFA) 90 mcg/act inhaler Inhale 2 puffs every 6 (six) hours as needed for wheezing 1 Inhaler 0    Magnesium 400 MG TABS Take 1 tablet (400 mg total) by mouth daily 30 tablet 5    Naproxen Sodium 220 MG CAPS Take by mouth      Galcanezumab-gnlm 120 MG/ML SOAJ Inject 1 ml in each thigh and or stomach for a total of two injections the first time  Then one injection only every thirty days afterward (Patient not taking: No sig reported) 2 pen 3    SUMAtriptan (IMITREX) 50 mg tablet Take 1 tab at migraine onset, max 2 tabs in 24 hours  Max 2 days a week (Patient not taking: No sig reported) 12 tablet 2     No current facility-administered medications for this visit         SOCIAL HISTORY:  Social History     Socioeconomic History    Marital status: Single     Spouse name: None    Number of children: None    Years of education: None    Highest education level: None   Occupational History    None   Tobacco Use    Smoking status: Former Smoker     Quit date:      Years since quittin 6    Smokeless tobacco: Never Used   Substance and Sexual Activity    Alcohol use: Yes     Comment: Whiskey and beer    Drug use: No    Sexual activity: None   Other Topics Concern    None   Social History Narrative    None     Social Determinants of Health     Financial Resource Strain: Not on file   Food Insecurity: Not on file   Transportation Needs: Not on file   Physical Activity: Not on file   Stress: Not on file   Social Connections: Not on file   Intimate Partner Violence: Not on file   Housing Stability: Not on file      Review of Systems   Constitutional: Negative for appetite change, chills and fever  Respiratory: Negative for cough and shortness of breath  Cardiovascular: Positive for leg swelling  Negative for chest pain  Gastrointestinal: Negative for diarrhea, nausea and vomiting  Musculoskeletal: Positive for arthralgias  Skin: Positive for wound  Neurological: Negative for weakness and numbness  Hematological: Negative  Psychiatric/Behavioral: Negative for confusion  Objective:       Wound 08/18/22 Venous Ulcer Ankle Anterior;Right (Active)   Wound Image Images linked 08/18/22 0923   Wound Description Slough; Yellow;Brown;Black;Eschar;Pink 08/18/22 0928   Rose Mary-wound Assessment Dry;Erythema;Edema; Yellow-brown 08/18/22 0928   Wound Length (cm) 3 7 cm 08/18/22 0928   Wound Width (cm) 4 2 cm 08/18/22 0928   Wound Depth (cm) 0 1 cm 08/18/22 0928   Wound Surface Area (cm^2) 15 54 cm^2 08/18/22 0928   Wound Volume (cm^3) 1 554 cm^3 08/18/22 0928   Calculated Wound Volume (cm^3) 1 55 cm^3 08/18/22 0928   Drainage Amount Moderate 08/18/22 0928   Drainage Description Serosanguineous 08/18/22 0928   Non-staged Wound Description Full thickness 08/18/22 0928       /77   Pulse 66   Temp 97 6 °F (36 4 °C)   Resp 16   Ht 5' 11" (1 803 m)   Wt 88 5 kg (195 lb)   BMI 27 20 kg/m²     Physical Exam  Vitals and nursing note reviewed  Constitutional:       General: He is not in acute distress  Appearance: He is not toxic-appearing or diaphoretic  HENT:      Head: Normocephalic and atraumatic  Eyes:      Extraocular Movements: Extraocular movements intact  Cardiovascular:      Rate and Rhythm: Normal rate and regular rhythm  Pulses: Normal pulses  Dorsalis pedis pulses are 2+ on the right side and 2+ on the left side  Posterior tibial pulses are 2+ on the right side and 2+ on the left side  Comments: Venous stasis presents BLE  Pulmonary:      Effort: Pulmonary effort is normal  No respiratory distress     Musculoskeletal: General: No tenderness or signs of injury  Cervical back: Normal range of motion and neck supple  Right lower leg: Edema present  Left lower leg: Edema present  Right foot: No foot drop  Left foot: No foot drop  Comments: No acute joint inflammation or warmth  Skin:     General: Skin is warm  Capillary Refill: Capillary refill takes less than 2 seconds  Coloration: Skin is not cyanotic or mottled  Findings: Wound present  No abscess or ecchymosis  Nails: There is no clubbing  Comments: Wound presents right lateral ankle  Wound bed is mixed with fibrous and granular tissues  No abscess or purulence  Serous drainage noted  No deep probing  No cellulitis  See wound assessment  Neurological:      General: No focal deficit present  Mental Status: He is alert and oriented to person, place, and time  Cranial Nerves: No cranial nerve deficit  Sensory: No sensory deficit  Motor: No weakness  Coordination: Coordination normal    Psychiatric:         Mood and Affect: Mood normal          Behavior: Behavior normal          Thought Content: Thought content normal          Judgment: Judgment normal            Wound Instructions:  Orders Placed This Encounter   Procedures    Wound cleansing and dressings     Right ankle wound:      May shower on dressing change days  Remove dressing prior and rinse well after  Pat dry  Apply new dressing immediately, do not leave open to air      Cleanse wound with mild soap and water or wound cleanser  Pat dry  Apply adaptic to wound followed by silver alginate   Cut to fit   Cover with gauze  Secure with sachin wrap and tape  Change dressing every other day and as needed for excessive drainage/dislodgement      Above performed at wound care center today     Standing Status:   Future     Standing Expiration Date:   8/18/2023    Wound compression and edema control     Elastic Tubular Stocking    Size E --double to right leg, single to left leg     Tubular elastic bandage: Apply from base of toes to behind the knee  Apply in AM, may remove for sleep  Avoid prolonged standing in one place  Elevate leg(s) above the level of the heart when sitting or as much as possible  Standing Status:   Future     Standing Expiration Date:   8/18/2023    Wound miscellaneous orders     Supplies ordered through [employer] --phone # [phone]     Standing Status:   Future     Standing Expiration Date:   8/18/2023    Debridement     This order was created via procedure documentation        Diagnosis ICD-10-CM Associated Orders   1  Chronic venous hypertension (idiopathic) with ulcer of right lower extremity (HCC)  I87 311 Wound cleansing and dressings    L97 919 Wound compression and edema control     Wound miscellaneous orders     Debridement     lidocaine (LMX) 4 % cream   2   Venous insufficiency of both lower extremities  I87 2 lidocaine (LMX) 4 % cream

## 2022-08-18 NOTE — PATIENT INSTRUCTIONS
Orders Placed This Encounter   Procedures    Wound cleansing and dressings     Right ankle wound:      May shower on dressing change days  Remove dressing prior and rinse well after  Pat dry  Apply new dressing immediately, do not leave open to air      Cleanse wound with mild soap and water or wound cleanser  Pat dry  Apply adaptic to wound followed by silver alginate  Cut to fit   Cover with gauze  Secure with sachin wrap and tape  Change dressing every other day and as needed for excessive drainage/dislodgement      Above performed at wound care center today     Standing Status:   Future     Standing Expiration Date:   8/18/2023    Wound compression and edema control     Elastic Tubular Stocking    Size E --double to right leg, single to left leg     Tubular elastic bandage: Apply from base of toes to behind the knee  Apply in AM, may remove for sleep  Avoid prolonged standing in one place  Elevate leg(s) above the level of the heart when sitting or as much as possible       Standing Status:   Future     Standing Expiration Date:   8/18/2023    Wound miscellaneous orders     Supplies ordered through [facility] --phone # [phone]     Standing Status:   Future     Standing Expiration Date:   8/18/2023

## 2022-08-18 NOTE — LETTER
August 18, 2022     [address]    Patient: David Vega   YOB: 1950   Date of Visit: 8/18/2022       Dear Dr Marla Watson:    Thank you for referring Trenton Viramontes to me for evaluation  Below are my notes for this consultation  If you have questions, please do not hesitate to call me  I look forward to following your patient along with you  Sincerely,        Monty Newberry DPM        CC: No Recipients  JOSE D Alberts [facility]  8/18/2022 10:30 AM  Sign when Signing Visit  Patient ID: David Vega is a 73 y o  male Date of Birth 1950     Chief Complaint  Chief Complaint   Patient presents with    New Patient Visit     Right ankle wound          Allergies  Aimovig [erenumab-aooe] and Sulfa antibiotics    Assessment:    No problem-specific Assessment & Plan notes found for this encounter  Diagnoses and all orders for this visit:    Chronic venous hypertension (idiopathic) with ulcer of right lower extremity (HCC)  -     Wound cleansing and dressings; Future  -     Wound compression and edema control; Future  -     Wound miscellaneous orders; Future  -     Debridement  -     lidocaine (LMX) 4 % cream    Venous insufficiency of both lower extremities  -     lidocaine (LMX) 4 % cream          Debridement   Wound 08/18/22 Venous Ulcer Ankle Anterior;Right    Universal Protocol:  Consent: Verbal consent obtained    Risks and benefits: risks, benefits and alternatives were discussed  Consent given by: patient  Timeout called at: 8/18/2022 9:55 AM   Patient understanding: patient states understanding of the procedure being performed  Patient identity confirmed: verbally with patient      Performed by: physician  Debridement type: selective  Pain control: lidocaine 4%  Post-debridement measurements  Length (cm): 3 7  Width (cm): 4 2  Depth (cm): 0 1  Percent debrided: 100%  Surface Area (cm^2): 15 54  Area debrided (cm^2): 15 54  Volume (cm^3): 1 55  Devitalized tissue debrided: biofilm, fibrin and slough  Instrument(s) utilized: blade  Bleeding: small  Hemostasis obtained with: pressure  Procedural pain (0-10): 0  Post-procedural pain: 0   Response to treatment: procedure was tolerated well          Plan:  1  Reviewed medical records including recent labs  Reviewed the note from PCP  Patient was counseled and educated on the condition and the diagnosis  2  The diagnosis, treatment options and prognosis were discussed with the patient  3  He has VSU on right ankle  Instructed compression, elevation, and low sodium diet  4  Treat him with serial debridement  Local care with silver alginate and DSD  Compression with Spandigrip X 2   5  Consider compression therapy depending on the progress  6  Patient will return in 2 weeks for re-evaluation  Wound 08/18/22 Venous Ulcer Ankle Anterior;Right (Active)   Wound Image Images linked 08/18/22 0923   Wound Description Slough; Yellow;Brown;Black;Eschar;Pink 08/18/22 0928   Rose Mary-wound Assessment Dry;Erythema;Edema; Yellow-brown 08/18/22 0928   Wound Length (cm) 3 7 cm 08/18/22 0928   Wound Width (cm) 4 2 cm 08/18/22 0928   Wound Depth (cm) 0 1 cm 08/18/22 0928   Wound Surface Area (cm^2) 15 54 cm^2 08/18/22 0928   Wound Volume (cm^3) 1 554 cm^3 08/18/22 0928   Calculated Wound Volume (cm^3) 1 55 cm^3 08/18/22 0928   Drainage Amount Moderate 08/18/22 0928   Drainage Description Serosanguineous 08/18/22 0928   Non-staged Wound Description Full thickness 08/18/22 0928       Wound 08/18/22 Venous Ulcer Ankle Anterior;Right (Active)   Date First Assessed/Time First Assessed: 08/18/22 0922   Primary Wound Type: Venous Ulcer  Location: Ankle  Wound Location Orientation: Anterior;Right       Subjective:        HPI  Es Ch was referred to wound center for treatment of right ankle ulcer  He presents with chief complaint of right ankle ulcer today  It has been draining for 1+ month now  It started with small wounds    Then, it has been getting larger  Tender to touch  He has skin discoloration on his lower legs for 10+ years  He has chronic swelling in his legs as well  No history of diabetes  No significant numbness in his feet  He walks with a cane  The following portions of the patient's history were reviewed and updated as appropriate: allergies, current medications, past family history, past medical history, past social history, past surgical history and problem list       PAST MEDICAL HISTORY:  Past Medical History:   Diagnosis Date    Arthritis     Heart murmur     No pluerisy    Hypertension     Migraine        PAST SURGICAL HISTORY:  No past surgical history on file  ALLERGIES:  Aimovig [erenumab-aooe] and Sulfa antibiotics    MEDICATIONS:  Current Outpatient Medications   Medication Sig Dispense Refill    albuterol (VENTOLIN HFA) 90 mcg/act inhaler Inhale 2 puffs every 6 (six) hours as needed for wheezing 1 Inhaler 0    Magnesium 400 MG TABS Take 1 tablet (400 mg total) by mouth daily 30 tablet 5    Naproxen Sodium 220 MG CAPS Take by mouth      Galcanezumab-gnlm 120 MG/ML SOAJ Inject 1 ml in each thigh and or stomach for a total of two injections the first time  Then one injection only every thirty days afterward (Patient not taking: No sig reported) 2 pen 3    SUMAtriptan (IMITREX) 50 mg tablet Take 1 tab at migraine onset, max 2 tabs in 24 hours  Max 2 days a week (Patient not taking: No sig reported) 12 tablet 2     No current facility-administered medications for this visit  SOCIAL HISTORY:  Social History     Socioeconomic History    Marital status: Single     Spouse name: None    Number of children: None    Years of education: None    Highest education level: None   Occupational History    None   Tobacco Use    Smoking status: Former Smoker     Quit date:      Years since quittin 6    Smokeless tobacco: Never Used   Substance and Sexual Activity    Alcohol use:  Yes Comment: Whiskey and beer    Drug use: No    Sexual activity: None   Other Topics Concern    None   Social History Narrative    None     Social Determinants of Health     Financial Resource Strain: Not on file   Food Insecurity: Not on file   Transportation Needs: Not on file   Physical Activity: Not on file   Stress: Not on file   Social Connections: Not on file   Intimate Partner Violence: Not on file   Housing Stability: Not on file      Review of Systems   Constitutional: Negative for appetite change, chills and fever  Respiratory: Negative for cough and shortness of breath  Cardiovascular: Positive for leg swelling  Negative for chest pain  Gastrointestinal: Negative for diarrhea, nausea and vomiting  Musculoskeletal: Positive for arthralgias  Skin: Positive for wound  Neurological: Negative for weakness and numbness  Hematological: Negative  Psychiatric/Behavioral: Negative for confusion  Objective:       Wound 08/18/22 Venous Ulcer Ankle Anterior;Right (Active)   Wound Image Images linked 08/18/22 0923   Wound Description Slough; Yellow;Brown;Black;Eschar;Pink 08/18/22 0928   Rose Mary-wound Assessment Dry;Erythema;Edema; Yellow-brown 08/18/22 0928   Wound Length (cm) 3 7 cm 08/18/22 0928   Wound Width (cm) 4 2 cm 08/18/22 0928   Wound Depth (cm) 0 1 cm 08/18/22 0928   Wound Surface Area (cm^2) 15 54 cm^2 08/18/22 0928   Wound Volume (cm^3) 1 554 cm^3 08/18/22 0928   Calculated Wound Volume (cm^3) 1 55 cm^3 08/18/22 0928   Drainage Amount Moderate 08/18/22 0928   Drainage Description Serosanguineous 08/18/22 0928   Non-staged Wound Description Full thickness 08/18/22 0928       /77   Pulse 66   Temp 97 6 °F (36 4 °C)   Resp 16   Ht 5' 11" (1 803 m)   Wt 88 5 kg (195 lb)   BMI 27 20 kg/m²     Physical Exam  Vitals and nursing note reviewed  Constitutional:       General: He is not in acute distress  Appearance: He is not toxic-appearing or diaphoretic     HENT: Head: Normocephalic and atraumatic  Eyes:      Extraocular Movements: Extraocular movements intact  Cardiovascular:      Rate and Rhythm: Normal rate and regular rhythm  Pulses: Normal pulses  Dorsalis pedis pulses are 2+ on the right side and 2+ on the left side  Posterior tibial pulses are 2+ on the right side and 2+ on the left side  Comments: Venous stasis presents BLE  Pulmonary:      Effort: Pulmonary effort is normal  No respiratory distress  Musculoskeletal:         General: No tenderness or signs of injury  Cervical back: Normal range of motion and neck supple  Right lower leg: Edema present  Left lower leg: Edema present  Right foot: No foot drop  Left foot: No foot drop  Comments: No acute joint inflammation or warmth  Skin:     General: Skin is warm  Capillary Refill: Capillary refill takes less than 2 seconds  Coloration: Skin is not cyanotic or mottled  Findings: Wound present  No abscess or ecchymosis  Nails: There is no clubbing  Comments: Wound presents right lateral ankle  Wound bed is mixed with fibrous and granular tissues  No abscess or purulence  Serous drainage noted  No deep probing  No cellulitis  See wound assessment  Neurological:      General: No focal deficit present  Mental Status: He is alert and oriented to person, place, and time  Cranial Nerves: No cranial nerve deficit  Sensory: No sensory deficit  Motor: No weakness  Coordination: Coordination normal    Psychiatric:         Mood and Affect: Mood normal          Behavior: Behavior normal          Thought Content: Thought content normal          Judgment: Judgment normal            Wound Instructions:  Orders Placed This Encounter   Procedures    Wound cleansing and dressings     Right ankle wound:      May shower on dressing change days  Remove dressing prior and rinse well after  Pat dry   Apply new dressing immediately, do not leave open to air      Cleanse wound with mild soap and water or wound cleanser  Pat dry  Apply adaptic to wound followed by silver alginate  Cut to fit   Cover with gauze  Secure with sachin wrap and tape  Change dressing every other day and as needed for excessive drainage/dislodgement      Above performed at wound care center today     Standing Status:   Future     Standing Expiration Date:   8/18/2023    Wound compression and edema control     Elastic Tubular Stocking    Size E --double to right leg, single to left leg     Tubular elastic bandage: Apply from base of toes to behind the knee  Apply in AM, may remove for sleep  Avoid prolonged standing in one place  Elevate leg(s) above the level of the heart when sitting or as much as possible  Standing Status:   Future     Standing Expiration Date:   8/18/2023    Wound miscellaneous orders     Supplies ordered through [employer] --phone # [phone]     Standing Status:   Future     Standing Expiration Date:   8/18/2023    Debridement     This order was created via procedure documentation        Diagnosis ICD-10-CM Associated Orders   1  Chronic venous hypertension (idiopathic) with ulcer of right lower extremity (HCC)  I87 311 Wound cleansing and dressings    L97 919 Wound compression and edema control     Wound miscellaneous orders     Debridement     lidocaine (LMX) 4 % cream   2   Venous insufficiency of both lower extremities  I87 2 lidocaine (LMX) 4 % cream

## 2022-08-18 NOTE — TELEPHONE ENCOUNTER
Telephone call from patient, he removed Spandigrip from right leg, states it hurt too much  Made aware to try only 1 instead of double, pt still unable to tolerate and refusing to wear Spandigrip to right leg at all  Instruct importance of elevating leg throughout the day to decrease swelling with 100% verbal return  Dr Gonzalez Fairly made aware and agreeable to above instruction

## 2022-09-01 ENCOUNTER — OFFICE VISIT (OUTPATIENT)
Dept: WOUND CARE | Facility: HOSPITAL | Age: 72
End: 2022-09-01
Payer: COMMERCIAL

## 2022-09-01 VITALS
SYSTOLIC BLOOD PRESSURE: 162 MMHG | HEART RATE: 83 BPM | DIASTOLIC BLOOD PRESSURE: 81 MMHG | RESPIRATION RATE: 20 BRPM | TEMPERATURE: 45.5 F

## 2022-09-01 DIAGNOSIS — I87.2 VENOUS INSUFFICIENCY OF BOTH LOWER EXTREMITIES: ICD-10-CM

## 2022-09-01 DIAGNOSIS — I87.311 CHRONIC VENOUS HYPERTENSION (IDIOPATHIC) WITH ULCER OF RIGHT LOWER EXTREMITY (HCC): Primary | ICD-10-CM

## 2022-09-01 DIAGNOSIS — L97.919 CHRONIC VENOUS HYPERTENSION (IDIOPATHIC) WITH ULCER OF RIGHT LOWER EXTREMITY (HCC): Primary | ICD-10-CM

## 2022-09-01 PROCEDURE — 11042 DBRDMT SUBQ TIS 1ST 20SQCM/<: CPT | Performed by: PODIATRIST

## 2022-09-01 RX ORDER — LIDOCAINE 40 MG/G
CREAM TOPICAL ONCE
Status: COMPLETED | OUTPATIENT
Start: 2022-09-01 | End: 2022-09-01

## 2022-09-01 RX ADMIN — LIDOCAINE: 40 CREAM TOPICAL at 09:03

## 2022-09-01 NOTE — PROGRESS NOTES
Patient ID: Mila Garner is a 73 y o  male Date of Birth 1950     Chief Complaint  Chief Complaint   Patient presents with    Follow Up Wound Care Visit     RLE wound         Allergies  Aimovig [erenumab-aooe] and Sulfa antibiotics    Assessment:    No problem-specific Assessment & Plan notes found for this encounter  Diagnoses and all orders for this visit:    Chronic venous hypertension (idiopathic) with ulcer of right lower extremity (HCC)  -     lidocaine (LMX) 4 % cream  -     Cancel: Wound cleansing and dressings; Future  -     Wound cleansing and dressings; Future  -     Debridement    Venous insufficiency of both lower extremities          Debridement   Wound 08/18/22 Venous Ulcer Ankle Anterior;Right    Universal Protocol:  Consent: Verbal consent obtained  Risks and benefits: risks, benefits and alternatives were discussed  Consent given by: patient  Time out: Immediately prior to procedure a "time out" was called to verify the correct patient, procedure, equipment, support staff and site/side marked as required  Timeout called at: 9/1/2022 9:08 AM   Patient understanding: patient states understanding of the procedure being performed  Patient identity confirmed: verbally with patient      Performed by: physician  Debridement type: surgical  Level of debridement: subcutaneous tissue  Pain control: lidocaine 4%  Post-debridement measurements  Length (cm): 3  Width (cm): 3  Depth (cm): 0 1  Percent debrided: 100%  Surface Area (cm^2): 9  Area debrided (cm^2): 9  Volume (cm^3): 0 9  Tissue and other material debrided: dermis, epidermis and subcutaneous tissue  Devitalized tissue debrided: biofilm, fibrin and slough  Instrument(s) utilized: blade  Bleeding: small  Hemostasis obtained with: pressure  Procedural pain (0-10): 0  Post-procedural pain: 0   Response to treatment: procedure was tolerated well          Plan:  1  Patient was counseled and educated on the condition and the diagnosis      2  Instructed compression, elevation, and low sodium diet  3  Treat him with serial debridement  Local care with adaptic/ regular alginate and DSD  Lower the compression with size F     4  Patient will return in 2 weeks for re-evaluation  Wound 08/18/22 Venous Ulcer Ankle Anterior;Right (Active)   Wound Image Images linked 09/01/22 0906   Wound Description Slough; Yellow;Pink;Epithelialization 09/01/22 0843   Rose Mary-wound Assessment Pink;Erythema 09/01/22 0843   Wound Length (cm) 3 cm 09/01/22 0843   Wound Width (cm) 2 8 cm 09/01/22 0843   Wound Depth (cm) 0 1 cm 09/01/22 0843   Wound Surface Area (cm^2) 8 4 cm^2 09/01/22 0843   Wound Volume (cm^3) 0 84 cm^3 09/01/22 0843   Calculated Wound Volume (cm^3) 0 84 cm^3 09/01/22 0843   Change in Wound Size % 45 81 09/01/22 0843   Drainage Amount Small 09/01/22 0843   Drainage Description Serosanguineous 09/01/22 0843   Non-staged Wound Description Full thickness 09/01/22 0843   Dressing Status Intact 09/01/22 0843       Wound 08/18/22 Venous Ulcer Ankle Anterior;Right (Active)   Date First Assessed/Time First Assessed: 08/18/22 0425   Primary Wound Type: Venous Ulcer  Location: Ankle  Wound Location Orientation: Anterior;Right       Subjective:          HPI  Maldonado James presents for right ankle ulcer  He presents with no compression in his leg  He could not tolerate it due to pain  No increased redness or swelling  No increased drainage  The following portions of the patient's history were reviewed and updated as appropriate: allergies, current medications, past family history, past medical history, past social history, past surgical history and problem list       PAST MEDICAL HISTORY:  Past Medical History:   Diagnosis Date    Arthritis     Heart murmur 1989    No pluerisy    Hypertension     Migraine        PAST SURGICAL HISTORY:  History reviewed  No pertinent surgical history       ALLERGIES:  Aimovig [erenumab-aooe] and Sulfa antibiotics    MEDICATIONS:  Current Outpatient Medications   Medication Sig Dispense Refill    albuterol (VENTOLIN HFA) 90 mcg/act inhaler Inhale 2 puffs every 6 (six) hours as needed for wheezing 1 Inhaler 0    Galcanezumab-gnlm 120 MG/ML SOAJ Inject 1 ml in each thigh and or stomach for a total of two injections the first time  Then one injection only every thirty days afterward (Patient not taking: No sig reported) 2 pen 3    Magnesium 400 MG TABS Take 1 tablet (400 mg total) by mouth daily 30 tablet 5    Naproxen Sodium 220 MG CAPS Take by mouth      SUMAtriptan (IMITREX) 50 mg tablet Take 1 tab at migraine onset, max 2 tabs in 24 hours  Max 2 days a week (Patient not taking: No sig reported) 12 tablet 2     No current facility-administered medications for this visit  SOCIAL HISTORY:  Social History     Socioeconomic History    Marital status: Single     Spouse name: None    Number of children: None    Years of education: None    Highest education level: None   Occupational History    None   Tobacco Use    Smoking status: Former Smoker     Quit date:      Years since quittin 6    Smokeless tobacco: Never Used   Substance and Sexual Activity    Alcohol use: Yes     Comment: Whiskey and beer    Drug use: No    Sexual activity: None   Other Topics Concern    None   Social History Narrative    None     Social Determinants of Health     Financial Resource Strain: Not on file   Food Insecurity: Not on file   Transportation Needs: Not on file   Physical Activity: Not on file   Stress: Not on file   Social Connections: Not on file   Intimate Partner Violence: Not on file   Housing Stability: Not on file      Review of Systems   Constitutional: Negative for appetite change, chills and fever  Respiratory: Negative for cough and shortness of breath  Cardiovascular: Positive for leg swelling  Negative for chest pain  Gastrointestinal: Negative for diarrhea, nausea and vomiting  Skin: Positive for wound  Neurological: Negative for weakness and numbness  Objective:       Wound 08/18/22 Venous Ulcer Ankle Anterior;Right (Active)   Wound Image Images linked 09/01/22 0906   Wound Description Slough; Yellow;Pink;Epithelialization 09/01/22 0843   Rose Mary-wound Assessment Pink;Erythema 09/01/22 0843   Wound Length (cm) 3 cm 09/01/22 0843   Wound Width (cm) 2 8 cm 09/01/22 0843   Wound Depth (cm) 0 1 cm 09/01/22 0843   Wound Surface Area (cm^2) 8 4 cm^2 09/01/22 0843   Wound Volume (cm^3) 0 84 cm^3 09/01/22 0843   Calculated Wound Volume (cm^3) 0 84 cm^3 09/01/22 0843   Change in Wound Size % 45 81 09/01/22 0843   Drainage Amount Small 09/01/22 0843   Drainage Description Serosanguineous 09/01/22 0843   Non-staged Wound Description Full thickness 09/01/22 0843   Dressing Status Intact 09/01/22 0843       /81   Pulse 83   Temp (!) 45 5 °F (7 5 °C)   Resp 20     Physical Exam  Vitals and nursing note reviewed  Constitutional:       General: He is not in acute distress  Appearance: He is not toxic-appearing or diaphoretic  Cardiovascular:      Rate and Rhythm: Normal rate and regular rhythm  Pulses: Normal pulses  Dorsalis pedis pulses are 2+ on the right side and 2+ on the left side  Posterior tibial pulses are 2+ on the right side and 2+ on the left side  Comments: Venous stasis presents BLE  Pulmonary:      Effort: Pulmonary effort is normal  No respiratory distress  Musculoskeletal:         General: No tenderness or signs of injury  Right lower leg: Edema present  Left lower leg: Edema present  Right foot: No foot drop  Left foot: No foot drop  Comments: No acute joint inflammation or warmth  Skin:     General: Skin is warm  Capillary Refill: Capillary refill takes less than 2 seconds  Coloration: Skin is not cyanotic or mottled  Findings: Wound present  No abscess or ecchymosis  Nails:  There is no clubbing  Comments: Wound presents right lateral ankle  Wound bed is mixed with fibrous and granular tissues  No abscess or purulence  No deep probing  No cellulitis  See wound assessment  Neurological:      General: No focal deficit present  Mental Status: He is alert and oriented to person, place, and time  Cranial Nerves: No cranial nerve deficit  Sensory: No sensory deficit  Motor: No weakness  Coordination: Coordination normal    Psychiatric:         Mood and Affect: Mood normal          Behavior: Behavior normal          Thought Content: Thought content normal          Judgment: Judgment normal            Wound Instructions:  Orders Placed This Encounter   Procedures    Wound cleansing and dressings     Right ankle wound:       May shower on dressing change days  Remove dressing prior and rinse well after  Pat dry  Apply new dressing immediately, do not leave open to air       Cleanse wound with mild soap and water or wound cleanser  Pat dry   Zinc to periwound  Apply adaptic to wound followed by alginate  Cut to fit   Cover with gauze   Secure with sachin wrap and tape     spandagrip size F to Right leg for compression  Change dressing every other day and as needed for excessive drainage/dislodgement     Standing Status:   Future     Standing Expiration Date:   9/1/2023    Debridement     This order was created via procedure documentation        Diagnosis ICD-10-CM Associated Orders   1  Chronic venous hypertension (idiopathic) with ulcer of right lower extremity (HCC)  I87 311 lidocaine (LMX) 4 % cream    L97 919 Wound cleansing and dressings     Debridement   2   Venous insufficiency of both lower extremities  I87 2

## 2022-09-01 NOTE — PATIENT INSTRUCTIONS
Orders Placed This Encounter   Procedures    Wound cleansing and dressings     Right ankle wound:       May shower on dressing change days  Remove dressing prior and rinse well after  Pat dry  Apply new dressing immediately, do not leave open to air       Cleanse wound with mild soap and water or wound cleanser  Pat dry   Zinc to periwound  Apply adaptic to wound followed by alginate   Cut to fit   Cover with gauze   Secure with sachin wrap and tape     spandagrip size F to Right leg for compression  Change dressing every other day and as needed for excessive drainage/dislodgement     Standing Status:   Future     Standing Expiration Date:   9/1/2023

## 2022-09-15 ENCOUNTER — OFFICE VISIT (OUTPATIENT)
Dept: WOUND CARE | Facility: HOSPITAL | Age: 72
End: 2022-09-15
Payer: COMMERCIAL

## 2022-09-15 VITALS
DIASTOLIC BLOOD PRESSURE: 89 MMHG | TEMPERATURE: 98.4 F | SYSTOLIC BLOOD PRESSURE: 158 MMHG | HEART RATE: 86 BPM | RESPIRATION RATE: 16 BRPM

## 2022-09-15 DIAGNOSIS — I87.311 CHRONIC VENOUS HYPERTENSION (IDIOPATHIC) WITH ULCER OF RIGHT LOWER EXTREMITY (HCC): Primary | ICD-10-CM

## 2022-09-15 DIAGNOSIS — L97.919 CHRONIC VENOUS HYPERTENSION (IDIOPATHIC) WITH ULCER OF RIGHT LOWER EXTREMITY (HCC): Primary | ICD-10-CM

## 2022-09-15 PROCEDURE — 99213 OFFICE O/P EST LOW 20 MIN: CPT | Performed by: PODIATRIST

## 2022-09-15 PROCEDURE — 99212 OFFICE O/P EST SF 10 MIN: CPT | Performed by: PODIATRIST

## 2022-09-15 NOTE — PATIENT INSTRUCTIONS
Orders Placed This Encounter   Procedures    Wound cleansing and dressings     Right ankle wound:         May shower on dressing change days  Remove dressing prior and rinse well after  Pat dry  Apply new dressing immediately, do not leave open to air         Cleanse wound with mild soap and water or wound cleanser  Pat dry   Zinc to periwound  Apply adaptic to wound followed by alginate  Cut to fit   Cover with gauze   Secure with sachin wrap and tape   Change dressing every other day and as needed for excessive drainage/dislodgement            Elastic Tubular Stocking: ace wrap     Tubular elastic bandage: Apply from base of toes to behind the knee  Apply in AM, may remove for sleep  Avoid prolonged standing in one place  Elevate leg(s) above the level of the heart when sitting or as much as possible       Standing Status:   Future     Standing Expiration Date:   9/15/2023

## 2022-09-15 NOTE — PROGRESS NOTES
Patient ID: Yvette Menjivar is a 81 y o  male Date of Birth 1950     Chief Complaint  Chief Complaint   Patient presents with    Follow Up Wound Care Visit     RLE wound          Allergies  Aimovig [erenumab-aooe] and Sulfa antibiotics    Assessment:    No problem-specific Assessment & Plan notes found for this encounter  Diagnoses and all orders for this visit:    Chronic venous hypertension (idiopathic) with ulcer of right lower extremity (HCC)  -     Wound cleansing and dressings; Future          Procedures    Plan:  1  Patient was counseled and educated on the condition and the diagnosis  2  Reinforced proper compression, elevation, and low sodium diet  3  Local care with adaptic/ regular alginate and DSD  Compression with size F Tubigrip  Zinc oxide to periwound  4  Patient will return in 2 weeks for re-evaluation  Wound 08/18/22 Venous Ulcer Ankle Anterior;Right (Active)   Wound Image Images linked 09/15/22 0859   Wound Description Beefy red;Slough 09/15/22 0859   Rose Mary-wound Assessment Callus;Scaly 09/15/22 0859   Wound Length (cm) 4 cm 09/15/22 0859   Wound Width (cm) 4 5 cm 09/15/22 0859   Wound Depth (cm) 0 1 cm 09/15/22 0859   Wound Surface Area (cm^2) 18 cm^2 09/15/22 0859   Wound Volume (cm^3) 1 8 cm^3 09/15/22 0859   Calculated Wound Volume (cm^3) 1 8 cm^3 09/15/22 0859   Change in Wound Size % -16 13 09/15/22 0859   Tunneling 0 cm 09/15/22 0859   Tunneling in depth located at 0 09/15/22 0859   Undermining 0 09/15/22 0859   Undermining is depth extending from 0 09/15/22 0859   Wound Site Closure CELIA 09/15/22 0859   Drainage Amount Moderate 09/15/22 0859   Drainage Description Serosanguineous 09/15/22 0859   Non-staged Wound Description Full thickness 09/15/22 0859   Treatments Cleansed 09/15/22 0859   Dressing Dry dressing 09/15/22 0859   Wound packed?  No 09/15/22 0859   Packing- # removed 0 09/15/22 0859   Packing- # inserted 0 09/15/22 0859   Dressing Changed New 09/15/22 0859 Patient Tolerance Tolerated well 09/15/22 0859   Dressing Status Clean;Dry; Intact 09/15/22 0859       Wound 08/18/22 Venous Ulcer Ankle Anterior;Right (Active)   Date First Assessed/Time First Assessed: 08/18/22 0922   Primary Wound Type: Venous Ulcer  Location: Ankle  Wound Location Orientation: Anterior;Right       Subjective:          HPI  Kwaku Michelle presents for right ankle ulcer  He presents with no compression in his leg  No increased redness or swelling  Decreased drainage  The following portions of the patient's history were reviewed and updated as appropriate: allergies, current medications, past family history, past medical history, past social history, past surgical history and problem list       PAST MEDICAL HISTORY:  Past Medical History:   Diagnosis Date    Arthritis     Heart murmur 1989    No pluerisy    Hypertension     Migraine        PAST SURGICAL HISTORY:  History reviewed  No pertinent surgical history  ALLERGIES:  Aimovig [erenumab-aooe] and Sulfa antibiotics    MEDICATIONS:  Current Outpatient Medications   Medication Sig Dispense Refill    albuterol (VENTOLIN HFA) 90 mcg/act inhaler Inhale 2 puffs every 6 (six) hours as needed for wheezing 1 Inhaler 0    Galcanezumab-gnlm 120 MG/ML SOAJ Inject 1 ml in each thigh and or stomach for a total of two injections the first time  Then one injection only every thirty days afterward (Patient not taking: No sig reported) 2 pen 3    Magnesium 400 MG TABS Take 1 tablet (400 mg total) by mouth daily 30 tablet 5    Naproxen Sodium 220 MG CAPS Take by mouth      SUMAtriptan (IMITREX) 50 mg tablet Take 1 tab at migraine onset, max 2 tabs in 24 hours  Max 2 days a week (Patient not taking: No sig reported) 12 tablet 2     No current facility-administered medications for this visit         SOCIAL HISTORY:  Social History     Socioeconomic History    Marital status: Single     Spouse name: None    Number of children: None    Years of education: None    Highest education level: None   Occupational History    None   Tobacco Use    Smoking status: Former Smoker     Quit date:      Years since quittin 7    Smokeless tobacco: Never Used   Substance and Sexual Activity    Alcohol use: Yes     Comment: Whiskey and beer    Drug use: No    Sexual activity: None   Other Topics Concern    None   Social History Narrative    None     Social Determinants of Health     Financial Resource Strain: Not on file   Food Insecurity: Not on file   Transportation Needs: Not on file   Physical Activity: Not on file   Stress: Not on file   Social Connections: Not on file   Intimate Partner Violence: Not on file   Housing Stability: Not on file      Review of Systems   Constitutional: Negative for appetite change, chills and fever  Respiratory: Negative for cough and shortness of breath  Cardiovascular: Positive for leg swelling  Negative for chest pain  Gastrointestinal: Negative for diarrhea, nausea and vomiting  Skin: Positive for wound  Neurological: Negative for weakness and numbness           Objective:       Wound 22 Venous Ulcer Ankle Anterior;Right (Active)   Wound Image Images linked 09/15/22 0859   Wound Description Beefy red;Slough 09/15/22 0859   Rose Mary-wound Assessment Callus;Scaly 09/15/22 0859   Wound Length (cm) 4 cm 09/15/22 0859   Wound Width (cm) 4 5 cm 09/15/22 0859   Wound Depth (cm) 0 1 cm 09/15/22 0859   Wound Surface Area (cm^2) 18 cm^2 09/15/22 0859   Wound Volume (cm^3) 1 8 cm^3 09/15/22 0859   Calculated Wound Volume (cm^3) 1 8 cm^3 09/15/22 0859   Change in Wound Size % -16 13 09/15/22 0859   Tunneling 0 cm 09/15/22 0859   Tunneling in depth located at 0 09/15/22 0859   Undermining 0 09/15/22 0859   Undermining is depth extending from 0 09/15/22 0859   Wound Site Closure CELIA 09/15/22 0859   Drainage Amount Moderate 09/15/22 0859   Drainage Description Serosanguineous 09/15/22 08   Non-staged Wound Description Full thickness 09/15/22 0859   Treatments Cleansed 09/15/22 0859   Dressing Dry dressing 09/15/22 0859   Wound packed? No 09/15/22 0859   Packing- # removed 0 09/15/22 0859   Packing- # inserted 0 09/15/22 0859   Dressing Changed New 09/15/22 0859   Patient Tolerance Tolerated well 09/15/22 0859   Dressing Status Clean;Dry; Intact 09/15/22 0859       /89   Pulse 86   Temp 98 4 °F (36 9 °C)   Resp 16     Physical Exam  Vitals and nursing note reviewed  Constitutional:       General: He is not in acute distress  Appearance: He is not toxic-appearing or diaphoretic  Cardiovascular:      Rate and Rhythm: Normal rate and regular rhythm  Pulses: Normal pulses  Dorsalis pedis pulses are 2+ on the right side and 2+ on the left side  Posterior tibial pulses are 2+ on the right side and 2+ on the left side  Comments: Venous stasis presents BLE  Pulmonary:      Effort: Pulmonary effort is normal  No respiratory distress  Musculoskeletal:         General: No tenderness or signs of injury  Right lower leg: Edema present  Left lower leg: Edema present  Right foot: No foot drop  Left foot: No foot drop  Comments: No acute joint inflammation or warmth  Skin:     General: Skin is warm  Capillary Refill: Capillary refill takes less than 2 seconds  Coloration: Skin is not cyanotic or mottled  Findings: Wound present  No abscess or ecchymosis  Nails: There is no clubbing  Comments: Wound presents right lateral ankle  Increased granular tissues on wound bed  No abscess or purulence  No deep probing  No cellulitis  See wound assessment  Neurological:      General: No focal deficit present  Mental Status: He is alert and oriented to person, place, and time  Cranial Nerves: No cranial nerve deficit  Sensory: No sensory deficit  Motor: No weakness        Coordination: Coordination normal    Psychiatric:         Mood and Affect: Mood normal          Behavior: Behavior normal          Thought Content: Thought content normal          Judgment: Judgment normal            Wound Instructions:  Orders Placed This Encounter   Procedures    Wound cleansing and dressings     Right ankle wound:         May shower on dressing change days  Remove dressing prior and rinse well after  Pat dry  Apply new dressing immediately, do not leave open to air         Cleanse wound with mild soap and water or wound cleanser  Pat dry   Zinc to periwound  Apply adaptic to wound followed by alginate  Cut to fit   Cover with gauze   Secure with sachin wrap and tape   Change dressing every other day and as needed for excessive drainage/dislodgement            Elastic Tubular Stocking: ace wrap     Tubular elastic bandage: Apply from base of toes to behind the knee  Apply in AM, may remove for sleep  Avoid prolonged standing in one place  Elevate leg(s) above the level of the heart when sitting or as much as possible  Standing Status:   Future     Standing Expiration Date:   9/15/2023        Diagnosis ICD-10-CM Associated Orders   1   Chronic venous hypertension (idiopathic) with ulcer of right lower extremity (HCC)  I87 311 Wound cleansing and dressings    L97 91

## 2022-09-29 ENCOUNTER — OFFICE VISIT (OUTPATIENT)
Dept: WOUND CARE | Facility: HOSPITAL | Age: 72
End: 2022-09-29
Payer: COMMERCIAL

## 2022-09-29 VITALS
TEMPERATURE: 97.2 F | HEART RATE: 86 BPM | RESPIRATION RATE: 18 BRPM | DIASTOLIC BLOOD PRESSURE: 88 MMHG | SYSTOLIC BLOOD PRESSURE: 141 MMHG

## 2022-09-29 DIAGNOSIS — I87.2 VENOUS INSUFFICIENCY OF BOTH LOWER EXTREMITIES: ICD-10-CM

## 2022-09-29 DIAGNOSIS — I87.311 CHRONIC VENOUS HYPERTENSION (IDIOPATHIC) WITH ULCER OF RIGHT LOWER EXTREMITY (HCC): Primary | ICD-10-CM

## 2022-09-29 DIAGNOSIS — L97.919 CHRONIC VENOUS HYPERTENSION (IDIOPATHIC) WITH ULCER OF RIGHT LOWER EXTREMITY (HCC): Primary | ICD-10-CM

## 2022-09-29 PROCEDURE — 99212 OFFICE O/P EST SF 10 MIN: CPT | Performed by: PODIATRIST

## 2022-09-29 PROCEDURE — 99213 OFFICE O/P EST LOW 20 MIN: CPT | Performed by: PODIATRIST

## 2022-09-29 RX ORDER — LIDOCAINE 40 MG/G
CREAM TOPICAL ONCE
Status: COMPLETED | OUTPATIENT
Start: 2022-09-29 | End: 2022-09-29

## 2022-09-29 RX ADMIN — LIDOCAINE 1 APPLICATION: 40 CREAM TOPICAL at 10:59

## 2022-09-29 NOTE — PROGRESS NOTES
Patient ID: Manjula Ball is a 81 y o  male Date of Birth 1950     Chief Complaint  Chief Complaint   Patient presents with    Follow Up Wound Care Visit     Right leg wound         Allergies  Aimovig [erenumab-aooe] and Sulfa antibiotics    Assessment:    No problem-specific Assessment & Plan notes found for this encounter  Diagnoses and all orders for this visit:    Chronic venous hypertension (idiopathic) with ulcer of right lower extremity (HCC)  -     lidocaine (LMX) 4 % cream  -     Wound cleansing and dressings; Future    Venous insufficiency of both lower extremities          Procedures    Plan:  1  Patient was counseled and educated on the condition and the diagnosis  2  Reinforced proper compression, elevation, and low sodium diet  3  Continue local care with adaptic/ regular alginate and DSD  Compression with Tubigrip  Zinc oxide to periwound  4  Patient will return in 2 weeks for re-evaluation  Wound 08/18/22 Venous Ulcer Ankle Anterior;Right (Active)   Wound Image Images linked 09/29/22 1012   Wound Description Hypergranulation; Beefy red;Yellow;Slough 09/29/22 1012   Rose Mary-wound Assessment Edema; Erythema;Dry;Scaly 09/29/22 1012   Wound Length (cm) 7 cm 09/29/22 1012   Wound Width (cm) 5 cm 09/29/22 1012   Wound Depth (cm) 0 1 cm 09/29/22 1012   Wound Surface Area (cm^2) 35 cm^2 09/29/22 1012   Wound Volume (cm^3) 3 5 cm^3 09/29/22 1012   Calculated Wound Volume (cm^3) 3 5 cm^3 09/29/22 1012   Change in Wound Size % -125 81 09/29/22 1012   Drainage Amount Moderate 09/29/22 1012   Drainage Description Serosanguineous; Yellow 09/29/22 1012   Non-staged Wound Description Full thickness 09/29/22 1012   Dressing Status Intact 09/29/22 1012       Wound 08/18/22 Venous Ulcer Ankle Anterior;Right (Active)   Date First Assessed/Time First Assessed: 08/18/22 0922   Primary Wound Type: Venous Ulcer  Location: Ankle  Wound Location Orientation: Anterior;Right       Subjective: DEMETRIA Stroud presents for right ankle ulcer  He presents with no compression in his leg  No increased redness or swelling  Decreased pain  The following portions of the patient's history were reviewed and updated as appropriate: allergies, current medications, past family history, past medical history, past social history, past surgical history and problem list       PAST MEDICAL HISTORY:  Past Medical History:   Diagnosis Date    Arthritis     Heart murmur     No pluerisy    Hypertension     Migraine        PAST SURGICAL HISTORY:  No past surgical history on file  ALLERGIES:  Aimovig [erenumab-aooe] and Sulfa antibiotics    MEDICATIONS:  Current Outpatient Medications   Medication Sig Dispense Refill    albuterol (VENTOLIN HFA) 90 mcg/act inhaler Inhale 2 puffs every 6 (six) hours as needed for wheezing 1 Inhaler 0    Galcanezumab-gnlm 120 MG/ML SOAJ Inject 1 ml in each thigh and or stomach for a total of two injections the first time  Then one injection only every thirty days afterward (Patient not taking: No sig reported) 2 pen 3    Magnesium 400 MG TABS Take 1 tablet (400 mg total) by mouth daily 30 tablet 5    Naproxen Sodium 220 MG CAPS Take by mouth      SUMAtriptan (IMITREX) 50 mg tablet Take 1 tab at migraine onset, max 2 tabs in 24 hours  Max 2 days a week (Patient not taking: No sig reported) 12 tablet 2     No current facility-administered medications for this visit         SOCIAL HISTORY:  Social History     Socioeconomic History    Marital status: Single     Spouse name: Not on file    Number of children: Not on file    Years of education: Not on file    Highest education level: Not on file   Occupational History    Not on file   Tobacco Use    Smoking status: Former Smoker     Quit date:      Years since quittin 7    Smokeless tobacco: Never Used   Substance and Sexual Activity    Alcohol use: Yes     Comment: Whiskey and beer    Drug use: No    Sexual activity: Not on file   Other Topics Concern    Not on file   Social History Narrative    Not on file     Social Determinants of Health     Financial Resource Strain: Not on file   Food Insecurity: Not on file   Transportation Needs: Not on file   Physical Activity: Not on file   Stress: Not on file   Social Connections: Not on file   Intimate Partner Violence: Not on file   Housing Stability: Not on file      Review of Systems   Constitutional: Negative for appetite change, chills and fever  Respiratory: Negative for cough and shortness of breath  Cardiovascular: Positive for leg swelling  Negative for chest pain  Gastrointestinal: Negative for diarrhea, nausea and vomiting  Skin: Positive for wound  Neurological: Negative for weakness and numbness  Objective:       Wound 08/18/22 Venous Ulcer Ankle Anterior;Right (Active)   Wound Image Images linked 09/29/22 1012   Wound Description Hypergranulation; Beefy red;Yellow;Slough 09/29/22 1012   Rose Mary-wound Assessment Edema; Erythema;Dry;Scaly 09/29/22 1012   Wound Length (cm) 7 cm 09/29/22 1012   Wound Width (cm) 5 cm 09/29/22 1012   Wound Depth (cm) 0 1 cm 09/29/22 1012   Wound Surface Area (cm^2) 35 cm^2 09/29/22 1012   Wound Volume (cm^3) 3 5 cm^3 09/29/22 1012   Calculated Wound Volume (cm^3) 3 5 cm^3 09/29/22 1012   Change in Wound Size % -125 81 09/29/22 1012   Drainage Amount Moderate 09/29/22 1012   Drainage Description Serosanguineous; Yellow 09/29/22 1012   Non-staged Wound Description Full thickness 09/29/22 1012   Dressing Status Intact 09/29/22 1012       /88   Pulse 86   Temp (!) 97 2 °F (36 2 °C)   Resp 18     Physical Exam  Vitals and nursing note reviewed  Constitutional:       General: He is not in acute distress  Appearance: He is not toxic-appearing or diaphoretic  Cardiovascular:      Rate and Rhythm: Normal rate and regular rhythm  Pulses: Normal pulses             Dorsalis pedis pulses are 2+ on the right side and 2+ on the left side  Posterior tibial pulses are 2+ on the right side and 2+ on the left side  Comments: Venous stasis presents BLE  Pulmonary:      Effort: Pulmonary effort is normal  No respiratory distress  Musculoskeletal:         General: No tenderness or signs of injury  Right lower leg: Edema present  Left lower leg: Edema present  Right foot: No foot drop  Left foot: No foot drop  Comments: No acute joint inflammation or warmth  Skin:     General: Skin is warm  Capillary Refill: Capillary refill takes less than 2 seconds  Coloration: Skin is not cyanotic or mottled  Findings: Wound present  No abscess or ecchymosis  Nails: There is no clubbing  Comments: Wound presents right lateral ankle  Increased granular tissues on wound bed  Depth is minimal   Still has significant rashes around the ulcer  No abscess or purulence  No deep probing  No cellulitis  See wound assessment  Neurological:      General: No focal deficit present  Mental Status: He is alert and oriented to person, place, and time  Cranial Nerves: No cranial nerve deficit  Sensory: No sensory deficit  Motor: No weakness  Coordination: Coordination normal    Psychiatric:         Mood and Affect: Mood normal          Behavior: Behavior normal          Thought Content: Thought content normal          Judgment: Judgment normal            Wound Instructions:  Orders Placed This Encounter   Procedures    Wound cleansing and dressings     Right ankle wound:         May shower on dressing change days  Remove dressing prior and rinse well after  Pat dry  Apply new dressing immediately, do not leave open to air         Cleanse wound with mild soap and water or wound cleanser   Pat dry   Apply Zinc to entire wound----Zinc and lotrisone applied today at the [facility]  Apply alginate to the wound, Cover with gauze/ABD Pad  Secure with sachin wrap and tape Change dressing every day and as needed for excessive drainage/dislodgement                 Elastic Tubular Stocking: ace wrap      Tubular elastic bandage: Apply from base of toes to behind the knee  Apply in AM, may remove for sleep      Avoid prolonged standing in one place      Elevate leg(s) above the level of the heart when sitting or as much as possible  Standing Status:   Future     Standing Expiration Date:   9/29/2023        Diagnosis ICD-10-CM Associated Orders   1  Chronic venous hypertension (idiopathic) with ulcer of right lower extremity (HCC)  I87 311 lidocaine (LMX) 4 % cream    L97 919 Wound cleansing and dressings   2   Venous insufficiency of both lower extremities  I87 2

## 2022-09-29 NOTE — PATIENT INSTRUCTIONS
Orders Placed This Encounter   Procedures    Wound cleansing and dressings     Right ankle wound:         May shower on dressing change days  Remove dressing prior and rinse well after  Pat dry  Apply new dressing immediately, do not leave open to air         Cleanse wound with mild soap and water or wound cleanser  Pat dry   Apply Zinc to entire wound----Zinc and lotrisone applied today at the [facility]  Apply alginate to the wound, Cover with gauze/ABD Pad  Secure with sachin wrap and tape   Change dressing every day and as needed for excessive drainage/dislodgement                 Elastic Tubular Stocking: ace wrap      Tubular elastic bandage: Apply from base of toes to behind the knee  Apply in AM, may remove for sleep  Avoid prolonged standing in one place  Elevate leg(s) above the level of the heart when sitting or as much as possible       Standing Status:   Future     Standing Expiration Date:   9/29/2023

## 2022-10-10 ENCOUNTER — VBI (OUTPATIENT)
Dept: ADMINISTRATIVE | Facility: OTHER | Age: 72
End: 2022-10-10

## 2022-10-12 ENCOUNTER — OFFICE VISIT (OUTPATIENT)
Dept: WOUND CARE | Facility: HOSPITAL | Age: 72
End: 2022-10-12
Payer: COMMERCIAL

## 2022-10-12 VITALS
RESPIRATION RATE: 16 BRPM | TEMPERATURE: 98.6 F | DIASTOLIC BLOOD PRESSURE: 78 MMHG | SYSTOLIC BLOOD PRESSURE: 133 MMHG | HEART RATE: 82 BPM

## 2022-10-12 DIAGNOSIS — I87.311 CHRONIC VENOUS HYPERTENSION (IDIOPATHIC) WITH ULCER OF RIGHT LOWER EXTREMITY (HCC): Primary | ICD-10-CM

## 2022-10-12 DIAGNOSIS — L97.919 CHRONIC VENOUS HYPERTENSION (IDIOPATHIC) WITH ULCER OF RIGHT LOWER EXTREMITY (HCC): Primary | ICD-10-CM

## 2022-10-12 PROBLEM — L03.116 CELLULITIS OF LEFT HEEL: Status: ACTIVE | Noted: 2022-10-12

## 2022-10-12 PROBLEM — L03.116 CELLULITIS OF LEFT HEEL: Status: RESOLVED | Noted: 2022-10-12 | Resolved: 2022-10-12

## 2022-10-12 PROBLEM — Z00.00 MEDICARE ANNUAL WELLNESS VISIT, SUBSEQUENT: Status: RESOLVED | Noted: 2019-01-28 | Resolved: 2022-10-12

## 2022-10-12 PROCEDURE — 99213 OFFICE O/P EST LOW 20 MIN: CPT | Performed by: PODIATRIST

## 2022-10-12 PROCEDURE — 99212 OFFICE O/P EST SF 10 MIN: CPT | Performed by: PODIATRIST

## 2022-10-12 NOTE — PATIENT INSTRUCTIONS
Orders Placed This Encounter   Procedures    Wound cleansing and dressings     Right ankle wound:  May shower on dressing change days  Remove dressing prior and rinse well after  Pat dry  Apply new dressing  immediately, do not leave open to air  Cleanse wound with mild soap and water or wound cleanser  Pat dry  Apply Zinc to entire wound  Apply alginate to the wound, Cover with gauze/ABD Pad  Secure with sachin wrap and tape  Change dressing every day and as needed for excessive drainage/dislodgement  This was applied today  Standing Status:   Future     Standing Expiration Date:   10/12/2023    Wound compression and edema control     Elastic Tubular Stocking: ace wrap  Tubular elastic bandage: Apply from base of toes to behind the knee  Apply in AM, may remove for sleep  Avoid prolonged standing in one place  Elevate leg(s) above the level of the heart when sitting or as much as possible  Standing Status:   Future     Standing Expiration Date:   10/12/2023    Wound miscellaneous orders     PLEASE TRY TO SLEEP IN BED RATHER THAN A CHAIR, SLEEPING IN BED WILL HELP YOUR WOUND HEAL       Standing Status:   Future     Standing Expiration Date:   10/12/2023

## 2022-10-13 PROBLEM — L97.919 CHRONIC VENOUS HYPERTENSION (IDIOPATHIC) WITH ULCER OF RIGHT LOWER EXTREMITY (HCC): Status: ACTIVE | Noted: 2022-10-13

## 2022-10-13 PROBLEM — I87.311 CHRONIC VENOUS HYPERTENSION (IDIOPATHIC) WITH ULCER OF RIGHT LOWER EXTREMITY (HCC): Status: ACTIVE | Noted: 2022-10-13

## 2022-10-13 NOTE — PROGRESS NOTES
Patient ID: Nurys Cazares is a 81 y o  male Date of Birth 1950     My role is Foot, Ankle and Wound Specialist    Chief Complaint   Patient presents with   • Follow Up Wound Care Visit     RLE wound       VLU    Subjective: This patient is being seen to manage a venous ankle ulcer  The following portions of the patient's history were reviewed and updated as appropriate:   Patient Active Problem List   Diagnosis   • Chronic migraine without aura or status migrainosus   • Essential hypertension   • Gastroesophageal reflux disease without esophagitis   • Sinus tachycardia   • Elevated AST (SGOT)   • Abnormal EKG   • Class 1 obesity due to excess calories without serious comorbidity with body mass index (BMI) of 31 0 to 31 9 in adult   • Platelets decreased (HCC)   • Numbness in feet   • Onychomycosis of toenail   • Wound of right ankle   • Varicose 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   • Chronic venous hypertension (idiopathic) with ulcer of right lower extremity (HCC)     Past Medical History:   Diagnosis Date   • Arthritis    • Heart murmur     No pluerisy   • Hypertension    • Migraine      No past surgical history on file    Social History     Socioeconomic History   • Marital status: Single     Spouse name: None   • Number of children: None   • Years of education: None   • Highest education level: None   Occupational History   • None   Tobacco Use   • Smoking status: Former Smoker     Quit date:      Years since quittin 7   • Smokeless tobacco: Never Used   Substance and Sexual Activity   • Alcohol use: Yes     Comment: Whiskey and beer   • Drug use: No   • Sexual activity: None   Other Topics Concern   • None   Social History Narrative   • None     Social Determinants of Health     Financial Resource Strain: Not on file   Food Insecurity: Not on file   Transportation Needs: Not on file   Physical Activity: Not on file   Stress: Not on file   Social Connections: Not on file Intimate Partner Violence: Not on file   Housing Stability: Not on file        Current Outpatient Medications:   •  albuterol (VENTOLIN HFA) 90 mcg/act inhaler, Inhale 2 puffs every 6 (six) hours as needed for wheezing, Disp: 1 Inhaler, Rfl: 0  •  Galcanezumab-gnlm 120 MG/ML SOAJ, Inject 1 ml in each thigh and or stomach for a total of two injections the first time  Then one injection only every thirty days afterward (Patient not taking: No sig reported), Disp: 2 pen, Rfl: 3  •  Magnesium 400 MG TABS, Take 1 tablet (400 mg total) by mouth daily, Disp: 30 tablet, Rfl: 5  •  Naproxen Sodium 220 MG CAPS, Take by mouth, Disp: , Rfl:   •  SUMAtriptan (IMITREX) 50 mg tablet, Take 1 tab at migraine onset, max 2 tabs in 24 hours  Max 2 days a week (Patient not taking: No sig reported), Disp: 12 tablet, Rfl: 2  Family History   Problem Relation Age of Onset   • Cancer Mother    • Migraines Father    • Bipolar disorder Brother    • Migraines Maternal Grandmother       Review of Systems   Constitutional: Negative  Cardiovascular: Positive for leg swelling  Skin: Positive for color change and wound  Allergies  Aimovig [erenumab-aooe] and Sulfa antibiotics    Objective:  /78   Pulse 82   Temp 98 6 °F (37 °C)   Resp 16     Physical Exam  Vitals and nursing note reviewed  Constitutional:       General: He is not in acute distress  Appearance: Normal appearance  He is not ill-appearing, toxic-appearing or diaphoretic  Neurological:      Mental Status: He is alert  Wound 08/18/22 Venous Ulcer Ankle Anterior;Right (Active)   Wound Image    10/12/22 1527   Wound Description Granulation tissue; Epithelialization 10/12/22 1541   Rose Mary-wound Assessment Edema; Erythema;Dry;Scaly 10/12/22 1541   Wound Length (cm) 5 cm 10/12/22 1541   Wound Width (cm) 6 5 cm 10/12/22 1541   Wound Depth (cm) 0 1 cm 10/12/22 1541   Wound Surface Area (cm^2) 32 5 cm^2 10/12/22 1541   Wound Volume (cm^3) 3 25 cm^3 10/12/22 1541   Calculated Wound Volume (cm^3) 3 25 cm^3 10/12/22 1541   Change in Wound Size % -109 68 10/12/22 1541   Tunneling 0 cm 09/15/22 0859   Tunneling in depth located at 0 09/15/22 0859   Undermining 0 09/15/22 0859   Undermining is depth extending from 0 09/15/22 0859   Wound Site Closure CELIA 09/15/22 0859   Drainage Amount Moderate 10/12/22 1541   Drainage Description Serosanguineous; Yellow 10/12/22 1541   Non-staged Wound Description Full thickness 10/12/22 1541   Treatments Cleansed 09/15/22 0859   Dressing Dry dressing 09/15/22 0859   Wound packed? No 09/15/22 0859   Packing- # removed 0 09/15/22 0859   Packing- # inserted 0 09/15/22 [id number]   Dressing Changed New 09/15/22 0859   Patient Tolerance Tolerated well 09/15/22 0859   Dressing Status Intact 10/12/22 1541           Wound 08/18/22 Venous Ulcer Ankle Anterior;Right (Active)   Wound Image    10/12/22 1527   Wound Description Granulation tissue; Epithelialization 10/12/22 1541   Rose Mary-wound Assessment Edema; Erythema;Dry;Scaly 10/12/22 1541   Wound Length (cm) 5 cm 10/12/22 1541   Wound Width (cm) 6 5 cm 10/12/22 1541   Wound Depth (cm) 0 1 cm 10/12/22 1541   Wound Surface Area (cm^2) 32 5 cm^2 10/12/22 1541   Wound Volume (cm^3) 3 25 cm^3 10/12/22 1541   Calculated Wound Volume (cm^3) 3 25 cm^3 10/12/22 1541   Change in Wound Size % -109 68 10/12/22 1541   Tunneling 0 cm 09/15/22 0859   Tunneling in depth located at 0 09/15/22 0859   Undermining 0 09/15/22 0859   Undermining is depth extending from 0 09/15/22 0859   Wound Site Closure CELIA 09/15/22 0859   Drainage Amount Moderate 10/12/22 1541   Drainage Description Serosanguineous; Yellow 10/12/22 1541   Non-staged Wound Description Full thickness 10/12/22 1541   Treatments Cleansed 09/15/22 0859   Dressing Dry dressing 09/15/22 0859   Wound packed?  No 09/15/22 0859   Packing- # removed 0 09/15/22 0859   Packing- # inserted 0 09/15/22 0859   Dressing Changed New 09/15/22 0859   Patient Tolerance Tolerated well 09/15/22 0859   Dressing Status Intact 10/12/22 1541                         Diagnosis:  1  Chronic venous hypertension (idiopathic) with ulcer of right lower extremity (HCC)  -     Wound cleansing and dressings; Future  -     Wound compression and edema control; Future  -     Wound miscellaneous orders; Future        Diagnosis ICD-10-CM Associated Orders   1  Chronic venous hypertension (idiopathic) with ulcer of right lower extremity (HCC)  I87 311 Wound cleansing and dressings    L97 919 Wound compression and edema control     Wound miscellaneous orders        ASSESSMENT    Problems:    Chronic illness / Problem not at goal with exacerbation, progression or side effects of treatment  1  VLU        PLAN    I note that the patient has a habit of sleeping in a chair rather than supine in bed  He denies he does it due to back / breathing issues  I recommended that he sleep in a bed in a supine position 8 hours daily to assist in proper venous drainage of the lower extremity  No debridement performed  Continue same local care and compression

## 2022-10-26 ENCOUNTER — OFFICE VISIT (OUTPATIENT)
Dept: WOUND CARE | Facility: HOSPITAL | Age: 72
End: 2022-10-26
Payer: COMMERCIAL

## 2022-10-26 VITALS
DIASTOLIC BLOOD PRESSURE: 101 MMHG | TEMPERATURE: 97.4 F | SYSTOLIC BLOOD PRESSURE: 178 MMHG | HEART RATE: 87 BPM | RESPIRATION RATE: 16 BRPM

## 2022-10-26 DIAGNOSIS — I87.311 CHRONIC VENOUS HYPERTENSION (IDIOPATHIC) WITH ULCER OF RIGHT LOWER EXTREMITY (HCC): Primary | ICD-10-CM

## 2022-10-26 DIAGNOSIS — L97.919 CHRONIC VENOUS HYPERTENSION (IDIOPATHIC) WITH ULCER OF RIGHT LOWER EXTREMITY (HCC): Primary | ICD-10-CM

## 2022-10-26 PROCEDURE — 99212 OFFICE O/P EST SF 10 MIN: CPT | Performed by: PODIATRIST

## 2022-10-26 PROCEDURE — 99213 OFFICE O/P EST LOW 20 MIN: CPT | Performed by: PODIATRIST

## 2022-10-26 RX ORDER — BETAMETHASONE DIPROPIONATE 0.5 MG/G
OINTMENT TOPICAL 2 TIMES DAILY
Qty: 30 G | Refills: 0 | Status: SHIPPED | OUTPATIENT
Start: 2022-10-26

## 2022-10-26 NOTE — PROGRESS NOTES
Patient ID: Leo Arthur is a 81 y o  male Date of Birth 1950     My role is Foot, Ankle and Wound Specialist    Chief Complaint   Patient presents with   • Follow Up Wound Care Visit     RLE wound       VLU    Subjective: This patient is being seen to manage a venous ankle ulcer  The following portions of the patient's history were reviewed and updated as appropriate:   Patient Active Problem List   Diagnosis   • Chronic migraine without aura or status migrainosus   • Essential hypertension   • Gastroesophageal reflux disease without esophagitis   • Sinus tachycardia   • Elevated AST (SGOT)   • Abnormal EKG   • Class 1 obesity due to excess calories without serious comorbidity with body mass index (BMI) of 31 0 to 31 9 in adult   • Platelets decreased (HCC)   • Numbness in feet   • Onychomycosis of toenail   • Wound of right ankle   • Varicose 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   • Chronic venous hypertension (idiopathic) with ulcer of right lower extremity (HCC)     Past Medical History:   Diagnosis Date   • Arthritis    • Heart murmur     No pluerisy   • Hypertension    • Migraine      No past surgical history on file    Social History     Socioeconomic History   • Marital status: Single     Spouse name: None   • Number of children: None   • Years of education: None   • Highest education level: None   Occupational History   • None   Tobacco Use   • Smoking status: Former Smoker     Quit date:      Years since quittin 8   • Smokeless tobacco: Never Used   Substance and Sexual Activity   • Alcohol use: Yes     Comment: Whiskey and beer   • Drug use: No   • Sexual activity: None   Other Topics Concern   • None   Social History Narrative   • None     Social Determinants of Health     Financial Resource Strain: Not on file   Food Insecurity: Not on file   Transportation Needs: Not on file   Physical Activity: Not on file   Stress: Not on file   Social Connections: Not on file Intimate Partner Violence: Not on file   Housing Stability: Not on file        Current Outpatient Medications:   •  albuterol (VENTOLIN HFA) 90 mcg/act inhaler, Inhale 2 puffs every 6 (six) hours as needed for wheezing, Disp: 1 Inhaler, Rfl: 0  •  Galcanezumab-gnlm 120 MG/ML SOAJ, Inject 1 ml in each thigh and or stomach for a total of two injections the first time  Then one injection only every thirty days afterward (Patient not taking: No sig reported), Disp: 2 pen, Rfl: 3  •  Magnesium 400 MG TABS, Take 1 tablet (400 mg total) by mouth daily, Disp: 30 tablet, Rfl: 5  •  Naproxen Sodium 220 MG CAPS, Take by mouth, Disp: , Rfl:   •  SUMAtriptan (IMITREX) 50 mg tablet, Take 1 tab at migraine onset, max 2 tabs in 24 hours  Max 2 days a week (Patient not taking: No sig reported), Disp: 12 tablet, Rfl: 2  Family History   Problem Relation Age of Onset   • Cancer Mother    • Migraines Father    • Bipolar disorder Brother    • Migraines Maternal Grandmother       Review of Systems   Constitutional: Negative  Cardiovascular: Positive for leg swelling  Skin: Positive for color change and wound  Allergies  Aimovig [erenumab-aooe] and Sulfa antibiotics    Objective:  BP (!) 178/101   Pulse 87   Temp (!) 97 4 °F (36 3 °C)   Resp 16     Physical Exam  Vitals and nursing note reviewed  Constitutional:       General: He is not in acute distress  Appearance: Normal appearance  He is not ill-appearing, toxic-appearing or diaphoretic  Neurological:      Mental Status: He is alert  Wound 08/18/22 Venous Ulcer Ankle Anterior;Right (Active)   Wound Image   10/26/22 1334   Wound Description Epithelialization; Beefy red 10/26/22 1334   Rose Mary-wound Assessment Edema; Erythema;Dry;Scaly 10/26/22 1334   Wound Length (cm) 3 cm 10/26/22 1334   Wound Width (cm) 2 cm 10/26/22 1334   Wound Depth (cm) 0 1 cm 10/26/22 1334   Wound Surface Area (cm^2) 6 cm^2 10/26/22 1334   Wound Volume (cm^3) 0 6 cm^3 10/26/22 1334 Calculated Wound Volume (cm^3) 0 6 cm^3 10/26/22 1334   Change in Wound Size % 61 29 10/26/22 1334   Tunneling 0 cm 09/15/22 0859   Tunneling in depth located at 0 09/15/22 0859   Undermining 0 09/15/22 0859   Undermining is depth extending from 0 09/15/22 0859   Wound Site Closure CELIA 09/15/22 0859   Drainage Amount Small 10/26/22 1334   Drainage Description Serosanguineous 10/26/22 1334   Non-staged Wound Description Full thickness 10/26/22 1334   Treatments Cleansed 09/15/22 0859   Dressing Dry dressing 09/15/22 0859   Wound packed? No 09/15/22 0859   Packing- # removed 0 09/15/22 0859   Packing- # inserted 0 09/15/22 2900   Dressing Changed New 09/15/22 0859   Patient Tolerance Tolerated well 09/15/22 0859   Dressing Status Intact 10/26/22 1334           Wound 08/18/22 Venous Ulcer Ankle Anterior;Right (Active)   Wound Image   10/26/22 1334   Wound Description Epithelialization; Beefy red 10/26/22 1334   Rose Mary-wound Assessment Edema; Erythema;Dry;Scaly 10/26/22 1334   Wound Length (cm) 3 cm 10/26/22 1334   Wound Width (cm) 2 cm 10/26/22 1334   Wound Depth (cm) 0 1 cm 10/26/22 1334   Wound Surface Area (cm^2) 6 cm^2 10/26/22 1334   Wound Volume (cm^3) 0 6 cm^3 10/26/22 1334   Calculated Wound Volume (cm^3) 0 6 cm^3 10/26/22 1334   Change in Wound Size % 61 29 10/26/22 1334   Tunneling 0 cm 09/15/22 0859   Tunneling in depth located at 0 09/15/22 0859   Undermining 0 09/15/22 0859   Undermining is depth extending from 0 09/15/22 0859   Wound Site Closure CELIA 09/15/22 0859   Drainage Amount Small 10/26/22 1334   Drainage Description Serosanguineous 10/26/22 1334   Non-staged Wound Description Full thickness 10/26/22 1334   Treatments Cleansed 09/15/22 0859   Dressing Dry dressing 09/15/22 0859   Wound packed?  No 09/15/22 0859   Packing- # removed 0 09/15/22 0859   Packing- # inserted 0 09/15/22 0859   Dressing Changed New 09/15/22 0859   Patient Tolerance Tolerated well 09/15/22 0859   Dressing Status Intact 10/26/22 1334                         Diagnosis:  1  Chronic venous hypertension (idiopathic) with ulcer of right lower extremity (HCC)  -     Wound cleansing and dressings; Future        Diagnosis ICD-10-CM Associated Orders   1  Chronic venous hypertension (idiopathic) with ulcer of right lower extremity (HCC)  I87 [id number] Wound cleansing and dressings    L97 [id number]         ASSESSMENT    Problems:    Chronic illness / Problem not at goal with exacerbation, progression or side effects of treatment  1  Healing VLU      PLAN    The wound is healing well  There is significant venous stasis dermatitis around the wound  I am going to have start application of betamethasone to the periwound  Continue same local care and compression

## 2022-10-26 NOTE — PATIENT INSTRUCTIONS
Orders Placed This Encounter   Procedures    Wound cleansing and dressings     Right ankle wound:  May shower on dressing change days  Remove dressing prior and rinse well after  Pat dry  Apply new dressing  immediately, do not leave open to air  Cleanse wound with mild soap and water or wound cleanser  Pat dry  Apply Betamethasone to the periwound with each dressing change  Apply alginate to the wound, Cover with gauze/ABD Pad  Secure with sachin wrap and tape  Change dressing every day and as needed for excessive drainage/dislodgement    Left Great toe---Cover with Silicone bordered foam dressing--remove in 1 week and then may leave open to the air if no longer draining    This was applied today  Wound compression and edema control  Elastic Tubular Stocking: ace wrap  Tubular elastic bandage: Apply from base of toes to behind the knee  Apply in AM, may remove for sleep  Avoid prolonged standing in one place  Elevate leg(s) above the level of the heart when sitting or as much as possible  Wound miscellaneous orders  PLEASE TRY TO SLEEP IN BED RATHER THAN A CHAIR, SLEEPING IN BED WILL HELP YOUR WOUND HEAL       Standing Status:   Future     Standing Expiration Date:   10/26/2023

## 2022-11-28 ENCOUNTER — OFFICE VISIT (OUTPATIENT)
Dept: DENTISTRY | Facility: CLINIC | Age: 72
End: 2022-11-28

## 2022-11-28 VITALS — DIASTOLIC BLOOD PRESSURE: 91 MMHG | HEART RATE: 76 BPM | SYSTOLIC BLOOD PRESSURE: 142 MMHG

## 2022-11-28 DIAGNOSIS — Z00.00 ENCOUNTER FOR SCREENING AND PREVENTATIVE CARE: Primary | ICD-10-CM

## 2022-11-28 RX ORDER — AMOXICILLIN 500 MG/1
500 CAPSULE ORAL EVERY 8 HOURS SCHEDULED
Qty: 21 CAPSULE | Refills: 0 | Status: SHIPPED | OUTPATIENT
Start: 2022-11-28 | End: 2022-12-05

## 2022-11-28 NOTE — PROGRESS NOTES
Reviewed medical history with pt  ASA II  CC: pain UR level #8-9, started this past weekend  Not sure what triggers it  Pa #2-4, Limited exam  Pt last visit to any dentist was here in 2020 for limited exam  Explained to pt he needs rest  #4 but must first have Comp and FMX  #4 not believed to be area causing pain  DR Taylor examined: percussion sensitive #2,3    called in script to [facility], told pt to take OTC pain meds  PDL inflammed # 2m and deep pocketing #2,3 as well as inflammation gingiva there on lingual  Irrigated with Chlorhexidine       NV: comp exam, PC , FMX when pt out of pain

## 2022-12-09 ENCOUNTER — RA CDI HCC (OUTPATIENT)
Dept: OTHER | Facility: HOSPITAL | Age: 72
End: 2022-12-09

## 2022-12-09 NOTE — PROGRESS NOTES
[facility]  coding opportunities       Chart reviewed, no opportunity found:   [address]        Patients Insurance     Medicare Insurance: [employer]

## 2022-12-14 ENCOUNTER — OFFICE VISIT (OUTPATIENT)
Dept: FAMILY MEDICINE CLINIC | Facility: CLINIC | Age: 72
End: 2022-12-14

## 2022-12-14 VITALS
RESPIRATION RATE: 16 BRPM | WEIGHT: 187.4 LBS | SYSTOLIC BLOOD PRESSURE: 137 MMHG | TEMPERATURE: 97.6 F | HEART RATE: 103 BPM | DIASTOLIC BLOOD PRESSURE: 70 MMHG | BODY MASS INDEX: 26.23 KG/M2 | HEIGHT: 71 IN | OXYGEN SATURATION: 96 %

## 2022-12-14 DIAGNOSIS — G43.709 CHRONIC MIGRAINE WITHOUT AURA WITHOUT STATUS MIGRAINOSUS, NOT INTRACTABLE: ICD-10-CM

## 2022-12-14 DIAGNOSIS — Z23 ENCOUNTER FOR IMMUNIZATION: ICD-10-CM

## 2022-12-14 DIAGNOSIS — R00.0 SINUS TACHYCARDIA: ICD-10-CM

## 2022-12-14 DIAGNOSIS — S91.001S WOUND OF RIGHT ANKLE, SEQUELA: ICD-10-CM

## 2022-12-14 DIAGNOSIS — I83.11 VARICOSE VEINS OF RIGHT LOWER EXTREMITY WITH INFLAMMATION: ICD-10-CM

## 2022-12-14 DIAGNOSIS — L97.919 CHRONIC VENOUS HYPERTENSION (IDIOPATHIC) WITH ULCER OF RIGHT LOWER EXTREMITY (HCC): ICD-10-CM

## 2022-12-14 DIAGNOSIS — I87.311 CHRONIC VENOUS HYPERTENSION (IDIOPATHIC) WITH ULCER OF RIGHT LOWER EXTREMITY (HCC): ICD-10-CM

## 2022-12-14 DIAGNOSIS — Z11.59 NEED FOR HEPATITIS C SCREENING TEST: ICD-10-CM

## 2022-12-14 DIAGNOSIS — I10 ESSENTIAL HYPERTENSION: Primary | ICD-10-CM

## 2022-12-14 DIAGNOSIS — Z00.00 MEDICARE ANNUAL WELLNESS VISIT, SUBSEQUENT: ICD-10-CM

## 2022-12-14 DIAGNOSIS — R20.0 NUMBNESS IN FEET: ICD-10-CM

## 2022-12-14 DIAGNOSIS — D69.6 PLATELETS DECREASED (HCC): ICD-10-CM

## 2022-12-14 RX ORDER — IBUPROFEN 600 MG/1
600 TABLET ORAL EVERY 6 HOURS PRN
COMMUNITY

## 2022-12-14 NOTE — PROGRESS NOTES
Chief Complaint   Patient presents with   • Follow-up     6 month f/up      Health Maintenance   Topic Date Due   • Hepatitis C Screening  Never done   • Hepatitis B Vaccine (1 of 3 - 3-dose series) Never done   • COVID-19 Vaccine (3 - Booster for Pfizer series) 12/10/2021   • Medicare Annual Wellness Visit (AWV)  06/14/2022   • Colorectal Cancer Screening  12/27/2022   • BMI: Followup Plan  06/15/2023   • Fall Risk  12/14/2023   • Depression Screening  12/14/2023   • BMI: Adult  12/14/2023   • Pneumococcal Vaccine: 65+ Years  Completed   • Influenza Vaccine  Completed   • HIB Vaccine  Aged Out   • IPV Vaccine  Aged Out   • Hepatitis A Vaccine  Aged Out   • Meningococcal ACWY Vaccine  Aged Out   • HPV Vaccine  Aged Out        Assessment and Plan:     Problem List Items Addressed This Visit        Cardiovascular and Mediastinum    Chronic migraine without aura or status migrainosus     Patient reports improvement in headaches  Continue Magnesium 400 mg daily for migraine prophylaxis  Recommended to stay well-hydrated  Avoid migraine triggers  Relevant Medications    ibuprofen (MOTRIN) 600 mg tablet    Essential hypertension - Primary     BP remains stable  Patient does not take any blood pressure medication currently  Recommended to follow a low-sodium diet  Check labs as ordered in June 2022  Reprinted script for blood test for patient  Varicose 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     Use ACE wrap  Chronic venous hypertension (idiopathic) with ulcer of right lower extremity (HCC)     Patient has venous stasis ulcer on the right lateral ankle  Applied Unna boot  Recommended to remove Unna boot in 2 -3 days  Discussed skin care  Schedule follow-up visit with wound care clinic Dr Nessa Hardy  Relevant Orders    Strapping of unna boot       Other    Sinus tachycardia     Recommended patient to stay well-hydrated      Avoid caffeine, decrease alcohol consumption  Platelets decreased ([facility] )     Check CBC with dif  Numbness in feet     Symptoms are likely secondary to peripheral neuropathy  Check CMP, vitamin B12, TSH as ordered in June 2022  Wound of right ankle   Other Visit Diagnoses     Encounter for immunization        Relevant Orders    Pneumococcal Conjugate Vaccine 20-valent (PCV20) (Completed)    influenza vaccine, high-dose, PF 0 7 mL (FLUZONE HIGH-DOSE) (Completed)    Medicare annual wellness visit, subsequent        Need for hepatitis C screening test        Relevant Orders    Hepatitis C Antibody ([facility])          Schedule follow-up visit in 4 months  Depression Screening and Follow-up Plan: Patient was screened for depression during today's encounter  They screened negative with a PHQ-2 score of 0  Preventive health issues were discussed with patient, and age appropriate screening tests were ordered as noted in patient's After Visit Summary  Personalized health advice and appropriate referrals for health education or preventive services given if needed, as noted in patient's After Visit Summary  History of Present Illness:     Patient presents for a Medicare Wellness Visit    HPI     Patient presents for 6 month follow-up visit, Medicare AWV  PMHx: HTN, sinus tachycardia, chronic migraine headache, GERD, OA, varicose veins, venous stasis dermatitis, chronic venous insufficiency with venous stasis ulcer R LE  Reviewed current medications  Patient did not get blood test done as ordered in June 2022  HTN - patient denies chest pain, shortness of breath, dizziness  Hedoes not take any blood pressure medication currently  Migraine headaches - headaches improved  Patient takes Magnesium 400 mg daily  Patient was followed by wound care Dr Candelario Pham, last  seen in October 2022 for venous stasis ulcer on right lateral ankle  C/o pain in R foot/ R ankle  Takes Ibuprofen PRN      C/o numbness in both feet pain  Quit smoking 17 years ago  Drinks whiskey daily ( 2-3 drinks)    Declined colonoscopy, had negative FIT in 12/2021  Patient Care Team:  Neto Shah MD as PCP - General (Family Medicine)  Neto Shah MD as PCP - [address] [place] [address] (RTE)  [facility]     Review of Systems:     Review of Systems   Constitutional: Positive for fatigue  Negative for activity change, appetite change, chills and fever  HENT: Negative for congestion, ear pain, hearing loss, sore throat and trouble swallowing  Eyes: Positive for photophobia  Negative for pain, discharge, redness, itching and visual disturbance  Wears glasses   Respiratory: Negative for cough, chest tightness, shortness of breath and wheezing  Cardiovascular: Negative for chest pain, palpitations and leg swelling  Gastrointestinal: Negative for abdominal pain, blood in stool, constipation, diarrhea, nausea and vomiting  Genitourinary: Negative for difficulty urinating, dysuria and hematuria  Nocturia   Musculoskeletal: Positive for arthralgias  Negative for back pain and gait problem  Skin:        See HPI   Neurological: Positive for numbness (in feet) and headaches (improved)  Negative for dizziness and syncope  Hematological: Negative  Psychiatric/Behavioral: Negative for dysphoric mood  The patient is not nervous/anxious           Problem List:     Patient Active Problem List   Diagnosis   • Chronic migraine without aura or status migrainosus   • Essential hypertension   • Gastroesophageal reflux disease without esophagitis   • Sinus tachycardia   • Elevated AST (SGOT)   • Abnormal EKG   • Class 1 obesity due to excess calories without serious comorbidity with body mass index (BMI) of 31 0 to 31 9 in adult   • Platelets decreased (HCC)   • Numbness in feet   • Onychomycosis of toenail   • Wound of right ankle   • Varicose 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   • Chronic venous hypertension (idiopathic) with ulcer of right lower extremity [facility])      Past Medical and Surgical History:     Past Medical History:   Diagnosis Date   • Arthritis    • Heart murmur     No pluerisy   • Hypertension    • Migraine      History reviewed  No pertinent surgical history  Family History:     Family History   Problem Relation Age of Onset   • Cancer Mother    • Migraines Father    • Bipolar disorder Brother    • Migraines Maternal Grandmother       Social History:     Social History     Socioeconomic History   • Marital status: Single     Spouse name: None   • Number of children: None   • Years of education: None   • Highest education level: None   Occupational History   • None   Tobacco Use   • Smoking status: Former     Types: Cigarettes     Quit date:      Years since quittin 9   • Smokeless tobacco: Never   Vaping Use   • Vaping Use: Never used   Substance and Sexual Activity   • Alcohol use: Yes     Comment: Whiskey and beer   • Drug use: No   • Sexual activity: None   Other Topics Concern   • None   Social History Narrative   • None     Social Determinants of Health     Financial Resource Strain: Not on file   Food Insecurity: No Food Insecurity   • Worried About Running Out of Food in the Last Year: Never true   • Ran Out of Food in the Last Year: Never true   Transportation Needs: No Transportation Needs   • Lack of Transportation (Medical): No   • Lack of Transportation (Non-Medical):  No   Physical Activity: Not on file   Stress: Not on file   Social Connections: Not on file   Intimate Partner Violence: Not on file   Housing Stability: Not on file      Medications and Allergies:     Current Outpatient Medications   Medication Sig Dispense Refill   • betamethasone, augmented, (DIPROLENE) 0 05 % ointment Apply topically 2 (two) times a day 30 g 0   • ibuprofen (MOTRIN) 600 mg tablet Take 600 mg by mouth every 6 (six) hours as needed for mild pain     • Magnesium 400 MG TABS Take 1 tablet (400 mg total) by mouth daily 30 tablet 5   • SUMAtriptan (IMITREX) 50 mg tablet Take 1 tab at migraine onset, max 2 tabs in 24 hours  Max 2 days a week 12 tablet 2   • albuterol (VENTOLIN HFA) 90 mcg/act inhaler Inhale 2 puffs every 6 (six) hours as needed for wheezing (Patient not taking: Reported on 11/28/2022) 1 Inhaler 0   • Galcanezumab-gnlm 120 MG/ML SOAJ Inject 1 ml in each thigh and or stomach for a total of two injections the first time  Then one injection only every thirty days afterward (Patient not taking: Reported on 6/15/2022) 2 pen 3     No current facility-administered medications for this visit  Allergies   Allergen Reactions   • Aimovig [Erenumab-Aooe] Other (See Comments)     Constipation    • Sulfa Antibiotics GI Intolerance      Immunizations:     Immunization History   Administered Date(s) Administered   • COVID-19 PFIZER VACCINE 0 3 ML IM 06/15/2021, 07/10/2021   • Influenza, high dose seasonal 0 7 mL 10/16/2018, 12/14/2021, 12/14/2022   • Pneumococcal Conjugate Vaccine 20-valent (Pcv20), Polysace 12/14/2022      Health Maintenance:         Topic Date Due   • Hepatitis C Screening  Never done   • Colorectal Cancer Screening  12/27/2022         Topic Date Due   • Hepatitis B Vaccine (1 of 3 - 3-dose series) Never done   • COVID-19 Vaccine (3 - Booster for Carbajal Peter series) 12/10/2021      Medicare Screening Tests and Risk Assessments:     Renetta Joselito is here for his Subsequent Wellness visit  Health Risk Assessment:   Patient rates overall health as good  Patient feels that their physical health rating is same  Patient is satisfied with their life  Eyesight was rated as slightly worse  Hearing was rated as slightly worse  Patient feels that their emotional and mental health rating is same  Patients states they are never, rarely angry  Patient states they are sometimes unusually tired/fatigued  Pain experienced in the last 7 days has been a lot  Patient's pain rating has been 8/10  Patient states that he has experienced no weight loss or gain in last 6 months  Depression Screening:   PHQ-2 Score: 0      Fall Risk Screening: In the past year, patient has experienced: no history of falling in past year      Home Safety:  Patient has trouble with stairs inside or outside of their home  Patient has working smoke alarms and has working carbon monoxide detector  Home safety hazards include: none  Nutrition:   Current diet is Regular  Medications:   Patient is not currently taking any over-the-counter supplements  Patient is able to manage medications  Activities of Daily Living (ADLs)/Instrumental Activities of Daily Living (IADLs):   Walk and transfer into and out of bed and chair?: Yes  Dress and groom yourself?: Yes    Bathe or shower yourself?: Yes    Feed yourself?  Yes  Do your laundry/housekeeping?: Yes  Manage your money, pay your bills and track your expenses?: Yes  Make your own meals?: Yes    Do your own shopping?: Yes    Previous Hospitalizations:   Any hospitalizations or ED visits within the last 12 months?: No      Advance Care Planning:   Living will: No    Durable POA for healthcare: No    Advanced directive: No    Advanced directive counseling given: Yes      Cognitive Screening:   Provider or family/friend/caregiver concerned regarding cognition?: No    PREVENTIVE SCREENINGS      Cardiovascular Screening:    General: Screening Current    Due for: Lipid Panel      Diabetes Screening:     General: Risks and Benefits Discussed    Due for: Blood Glucose      Colorectal Cancer Screening:     General: Screening Current      Prostate Cancer Screening:    General: Risks and Benefits Discussed    Due for: PSA      Abdominal Aortic Aneurysm (AAA) Screening:    Risk factors include: age between 73-69 yo and tobacco use        Lung Cancer Screening:     General: Screening Not Indicated      Hepatitis C Screening:    General: Risks and Benefits Discussed    Hep C Screening Accepted: Yes      Screening, Brief Intervention, and Referral to Treatment (SBIRT)    Screening  Typical number of drinks in a day: 3  Typical number of drinks in a week: 15  Interpretation: Low risk drinking behavior  Single Item Drug Screening:  How often have you used an illegal drug (including marijuana) or a prescription medication for non-medical reasons in the past year? never    Single Item Drug Screen Score: 0  Interpretation: Negative screen for possible drug use disorder    Other Counseling Topics:   Car/seat belt/driving safety, skin self-exam and calcium and vitamin D intake and regular weightbearing exercise  No results found  Physical Exam:     /70 (BP Location: Left arm, Patient Position: Sitting)   Pulse 103   Temp 97 6 °F (36 4 °C)   Resp 16   Ht 5' 11" (1 803 m)   Wt 85 kg (187 lb 6 4 oz)   SpO2 96%   BMI 26 14 kg/m²     Physical Exam  Vitals and nursing note reviewed  Constitutional:       Appearance: Normal appearance  HENT:      Head: Normocephalic and atraumatic  Eyes:      Conjunctiva/sclera: Conjunctivae normal       Pupils: Pupils are equal, round, and reactive to light  Neck:      Vascular: No carotid bruit  Cardiovascular:      Rate and Rhythm: Regular rhythm  Tachycardia present  Heart sounds: No murmur heard  Comments: Mild BL ankle edema  BL LE varicosities  Pulmonary:      Effort: Pulmonary effort is normal       Breath sounds: Normal breath sounds  Abdominal:      General: There is no distension  Palpations: Abdomen is soft  Tenderness: There is no abdominal tenderness  Musculoskeletal:         General: Normal range of motion  Cervical back: Normal range of motion and neck supple  Skin:     General: Skin is warm and dry  Comments: Venous stasis dermatitis changes BL LE   R lateral ankle is sollen, tender to palpation    There is venous stasis ulcer on right lateral ankle with some yellowish drainage  Dry, scaly patches on right foot and R distal calf  Neurological:      General: No focal deficit present  Mental Status: He is alert and oriented to person, place, and time     Psychiatric:         Mood and Affect: Mood normal           Anselmo Del Toro MD

## 2022-12-14 NOTE — PROGRESS NOTES
Name: Camila Betts      : 1950      MRN: [id number]  Encounter Provider: Angel Bravo MD  Encounter Date: 2022   Encounter department: [address] [place] [address]    Chief Complaint   Patient presents with   • Follow-up     6 month f/up      Health Maintenance   Topic Date Due   • Hepatitis C Screening  Never done   • Hepatitis B Vaccine (1 of 3 - 3-dose series) Never done   • Pneumococcal Vaccine: 65+ Years (1 - PCV) Never done   • COVID-19 Vaccine (3 - Booster for Pfizer series) 12/10/2021   • Medicare Annual Wellness Visit (AWV)  2022   • Influenza Vaccine (1) 2022   • Colorectal Cancer Screening  2022   • Fall Risk  06/15/2023   • Depression Screening  06/15/2023   • BMI: Followup Plan  06/15/2023   • BMI: Adult  2023   • HIB Vaccine  Aged Out   • IPV Vaccine  Aged Out   • Hepatitis A Vaccine  Aged Out   • Meningococcal ACWY Vaccine  Aged Out   • HPV Vaccine  Aged Out       Assessment & Plan     1  Essential hypertension    2  Sinus tachycardia    3  Numbness in feet    4  Chronic migraine without aura without status migrainosus, not intractable    5  Chronic venous hypertension (idiopathic) with ulcer of right lower extremity (HCC)    6  Wound of right ankle, sequela    7  Encounter for immunization    8  Need for hepatitis C screening test           Subjective      HPI  Review of Systems    Current Outpatient Medications on File Prior to Visit   Medication Sig   • betamethasone, augmented, (DIPROLENE) 0 05 % ointment Apply topically 2 (two) times a day   • ibuprofen (MOTRIN) 600 mg tablet Take 600 mg by mouth every 6 (six) hours as needed for mild pain   • Magnesium 400 MG TABS Take 1 tablet (400 mg total) by mouth daily   • Naproxen Sodium 220 MG CAPS Take by mouth   • SUMAtriptan (IMITREX) 50 mg tablet Take 1 tab at migraine onset, max 2 tabs in 24 hours   Max 2 days a week   • albuterol (VENTOLIN HFA) 90 mcg/act inhaler Inhale 2 puffs every 6 (six) hours as needed for wheezing (Patient not taking: Reported on 11/28/2022)   • Galcanezumab-gnlm 120 MG/ML SOAJ Inject 1 ml in each thigh and or stomach for a total of two injections the first time   Then one injection only every thirty days afterward (Patient not taking: Reported on 6/15/2022)       Objective     /70 (BP Location: Left arm, Patient Position: Sitting)   Pulse 103   Temp 97 6 °F (36 4 °C)   Resp 16   Ht 5' 11" (1 803 m)   Wt 85 kg (187 lb 6 4 oz)   SpO2 96%   BMI 26 14 kg/m²     Physical Exam  Shy Knutson MD

## 2022-12-14 NOTE — PATIENT INSTRUCTIONS
Medicare Preventive Visit Patient Instructions  Thank you for completing your Welcome to Medicare Visit or Medicare Annual Wellness Visit today  Your next wellness visit will be due in one year (12/15/2023)  The screening/preventive services that you may require over the next 5-10 years are detailed below  Some tests may not apply to you based off risk factors and/or age  Screening tests ordered at today's visit but not completed yet may show as past due  Also, please note that scanned in results may not display below  Preventive Screenings:  Service Recommendations Previous Testing/Comments   Colorectal Cancer Screening  · Colonoscopy    · Fecal Occult Blood Test (FOBT)/Fecal Immunochemical Test (FIT)  · Fecal DNA/Cologuard Test  · Flexible Sigmoidoscopy Age: 39-70 years old   Colonoscopy: every 10 years (May be performed more frequently if at higher risk)  OR  FOBT/FIT: every 1 year  OR  Cologuard: every 3 years  OR  Sigmoidoscopy: every 5 years  Screening may be recommended earlier than age 39 if at higher risk for colorectal cancer  Also, an individualized decision between you and your healthcare provider will decide whether screening between the ages of 74-80 would be appropriate   Colonoscopy: Not on file  FOBT/FIT: 12/27/2021  Cologuard: Not on file  Sigmoidoscopy: Not on file          Prostate Cancer Screening Individualized decision between patient and health care provider in men between ages of 53-78   Medicare will cover every 12 months beginning on the day after your 50th birthday PSA: No results in last 5 years           Hepatitis C Screening Once for adults born between 80 and 1965  More frequently in patients at high risk for Hepatitis C Hep C Antibody: Not on file        Diabetes Screening 1-2 times per year if you're at risk for diabetes or have pre-diabetes Fasting glucose: 94 mg/dL (8/24/2018)  A1C: No results in last 5 years (No results in last 5 years)      Cholesterol Screening Once every 5 years if you don't have a lipid disorder  May order more often based on risk factors  Lipid panel: 08/24/2018         Other Preventive Screenings Covered by Medicare:  1  Abdominal Aortic Aneurysm (AAA) Screening: covered once if your at risk  You're considered to be at risk if you have a family history of AAA or a male between the age of 73-68 who smoking at least 100 cigarettes in your lifetime  2  Lung Cancer Screening: covers low dose CT scan once per year if you meet all of the following conditions: (1) Age 50-69; (2) No signs or symptoms of lung cancer; (3) Current smoker or have quit smoking within the last 15 years; (4) You have a tobacco smoking history of at least 20 pack years (packs per day x number of years you smoked); (5) You get a written order from a healthcare provider  3  Glaucoma Screening: covered annually if you're considered high risk: (1) You have diabetes OR (2) Family history of glaucoma OR (3)  aged 48 and older OR (3)  American aged 72 and older  3  Osteoporosis Screening: covered every 2 years if you meet one of the following conditions: (1) Have a vertebral abnormality; (2) On glucocorticoid therapy for more than 3 months; (3) Have primary hyperparathyroidism; (4) On osteoporosis medications and need to assess response to drug therapy  5  HIV Screening: covered annually if you're between the age of 12-76  Also covered annually if you are younger than 13 and older than 72 with risk factors for HIV infection  For pregnant patients, it is covered up to 3 times per pregnancy      Immunizations:  Immunization Recommendations   Influenza Vaccine Annual influenza vaccination during flu season is recommended for all persons aged >= 6 months who do not have contraindications   Pneumococcal Vaccine   * Pneumococcal conjugate vaccine = PCV13 (Prevnar 13), PCV15 (Vaxneuvance), PCV20 (Prevnar 20)  * Pneumococcal polysaccharide vaccine = PPSV23 (Pneumovax) Adults 19-64 years old: 1-3 doses may be recommended based on certain risk factors  Adults 72 years old: 1-2 doses may be recommended based off what pneumonia vaccine you previously received   Hepatitis B Vaccine 3 dose series if at intermediate or high risk (ex: diabetes, end stage renal disease, liver disease)   Tetanus (Td) Vaccine - COST NOT COVERED BY MEDICARE PART B Following completion of primary series, a booster dose should be given every 10 years to maintain immunity against tetanus  Td may also be given as tetanus wound prophylaxis  Tdap Vaccine - COST NOT COVERED BY MEDICARE PART B Recommended at least once for all adults  For pregnant patients, recommended with each pregnancy  Shingles Vaccine (Shingrix) - COST NOT COVERED BY MEDICARE PART B  2 shot series recommended in those aged 48 and above     Health Maintenance Due:      Topic Date Due   • Hepatitis C Screening  Never done   • Colorectal Cancer Screening  12/27/2022     Immunizations Due:      Topic Date Due   • Hepatitis B Vaccine (1 of 3 - 3-dose series) Never done   • Pneumococcal Vaccine: 65+ Years (1 - PCV) Never done   • COVID-19 Vaccine (3 - Booster for Pfizer series) 12/10/2021   • Influenza Vaccine (1) 09/01/2022     Advance Directives   What are advance directives? Advance directives are legal documents that state your wishes and plans for medical care  These plans are made ahead of time in case you lose your ability to make decisions for yourself  Advance directives can apply to any medical decision, such as the treatments you want, and if you want to donate organs  What are the types of advance directives? There are many types of advance directives, and each state has rules about how to use them  You may choose a combination of any of the following:  · Living will: This is a written record of the treatment you want  You can also choose which treatments you do not want, which to limit, and which to stop at a certain time   This includes surgery, medicine, IV fluid, and tube feedings  · Durable power of  for healthcare [facility]): This is a written record that states who you want to make healthcare choices for you when you are unable to make them for yourself  This person, called a proxy, is usually a family member or a friend  You may choose more than 1 proxy  · Do not resuscitate (DNR) order:  A DNR order is used in case your heart stops beating or you stop breathing  It is a request not to have certain forms of treatment, such as CPR  A DNR order may be included in other types of advance directives  · Medical directive: This covers the care that you want if you are in a coma, near death, or unable to make decisions for yourself  You can list the treatments you want for each condition  Treatment may include pain medicine, surgery, blood transfusions, dialysis, IV or tube feedings, and a ventilator (breathing machine)  · Values history: This document has questions about your views, beliefs, and how you feel and think about life  This information can help others choose the care that you would choose  Why are advance directives important? An advance directive helps you control your care  Although spoken wishes may be used, it is better to have your wishes written down  Spoken wishes can be misunderstood, or not followed  Treatments may be given even if you do not want them  An advance directive may make it easier for your family to make difficult choices about your care  Weight Management   Why it is important to manage your weight:  Being overweight increases your risk of health conditions such as heart disease, high blood pressure, type 2 diabetes, and certain types of cancer  It can also increase your risk for osteoarthritis, sleep apnea, and other respiratory problems  Aim for a slow, steady weight loss  Even a small amount of weight loss can lower your risk of health problems    How to lose weight safely:  A safe and healthy way to lose weight is to eat fewer calories and get regular exercise  You can lose up about 1 pound a week by decreasing the number of calories you eat by 500 calories each day  Healthy meal plan for weight management:  A healthy meal plan includes a variety of foods, contains fewer calories, and helps you stay healthy  A healthy meal plan includes the following:  · Eat whole-grain foods more often  A healthy meal plan should contain fiber  Fiber is the part of grains, fruits, and vegetables that is not broken down by your body  Whole-grain foods are healthy and provide extra fiber in your diet  Some examples of whole-grain foods are whole-wheat breads and pastas, oatmeal, brown rice, and bulgur  · Eat a variety of vegetables every day  Include dark, leafy greens such as spinach, kale, daniel greens, and mustard greens  Eat yellow and orange vegetables such as carrots, sweet potatoes, and winter squash  · Eat a variety of fruits every day  Choose fresh or canned fruit (canned in its own juice or light syrup) instead of juice  Fruit juice has very little or no fiber  · Eat low-fat dairy foods  Drink fat-free (skim) milk or 1% milk  Eat fat-free yogurt and low-fat cottage cheese  Try low-fat cheeses such as mozzarella and other reduced-fat cheeses  · Choose meat and other protein foods that are low in fat  Choose beans or other legumes such as split peas or lentils  Choose fish, skinless poultry (chicken or turkey), or lean cuts of red meat (beef or pork)  Before you cook meat or poultry, cut off any visible fat  · Use less fat and oil  Try baking foods instead of frying them  Add less fat, such as margarine, sour cream, regular salad dressing and mayonnaise to foods  Eat fewer high-fat foods  Some examples of high-fat foods include french fries, doughnuts, ice cream, and cakes  · Eat fewer sweets  Limit foods and drinks that are high in sugar   This includes candy, cookies, regular soda, and sweetened drinks  Exercise:  Exercise at least 30 minutes per day on most days of the week  Some examples of exercise include walking, biking, dancing, and swimming  You can also fit in more physical activity by taking the stairs instead of the elevator or parking farther away from stores  Ask your healthcare provider about the best exercise plan for you  © Copyright [employer] 2018 Information is for End User's use only and may not be sold, redistributed or otherwise used for commercial purposes   All illustrations and images included in CareNotes® are the copyrighted property of A D A M , Inc  or [address] [place] [address] [employer]

## 2022-12-15 NOTE — ASSESSMENT & PLAN NOTE
Patient has venous stasis ulcer on the right lateral ankle  Applied Unna boot  Recommended to remove Unna boot in 2 -3 days  Discussed skin care  Schedule follow-up visit with wound care clinic Dr Nessa Hardy

## 2022-12-15 NOTE — ASSESSMENT & PLAN NOTE
Symptoms are likely secondary to peripheral neuropathy  Check CMP, vitamin B12, TSH as ordered in June 2022

## 2022-12-15 NOTE — ASSESSMENT & PLAN NOTE
BP remains stable  Patient does not take any blood pressure medication currently  Recommended to follow a low-sodium diet  Check labs as ordered in June 2022  Reprinted script for blood test for patient

## 2022-12-15 NOTE — ASSESSMENT & PLAN NOTE
Patient reports improvement in headaches  Continue Magnesium 400 mg daily for migraine prophylaxis  Recommended to stay well-hydrated  Avoid migraine triggers

## 2023-01-27 ENCOUNTER — OFFICE VISIT (OUTPATIENT)
Dept: WOUND CARE | Facility: HOSPITAL | Age: 73
End: 2023-01-27

## 2023-01-27 VITALS
DIASTOLIC BLOOD PRESSURE: 80 MMHG | HEIGHT: 71 IN | TEMPERATURE: 97.8 F | RESPIRATION RATE: 20 BRPM | SYSTOLIC BLOOD PRESSURE: 147 MMHG | WEIGHT: 195 LBS | HEART RATE: 85 BPM | BODY MASS INDEX: 27.3 KG/M2

## 2023-01-27 DIAGNOSIS — I87.311 CHRONIC VENOUS HYPERTENSION (IDIOPATHIC) WITH ULCER OF RIGHT LOWER EXTREMITY (HCC): Primary | ICD-10-CM

## 2023-01-27 DIAGNOSIS — I87.2 VENOUS STASIS DERMATITIS OF RIGHT LOWER EXTREMITY: ICD-10-CM

## 2023-01-27 DIAGNOSIS — L97.919 CHRONIC VENOUS HYPERTENSION (IDIOPATHIC) WITH ULCER OF RIGHT LOWER EXTREMITY (HCC): Primary | ICD-10-CM

## 2023-01-27 RX ORDER — BETAMETHASONE DIPROPIONATE 0.5 MG/G
OINTMENT TOPICAL 2 TIMES DAILY
Qty: 50 G | Refills: 0 | Status: SHIPPED | OUTPATIENT
Start: 2023-01-27

## 2023-01-27 NOTE — PROGRESS NOTES
Patient ID: Jean-Paul Carson is a 81 y o  male Date of Birth 1950     My role is Foot, Ankle and Wound Specialist    Chief Complaint   Patient presents with   • No Wound Consult     Patient arrived with no open or draining areas        Red area Right ankle    Subjective:   Miguel Skinner is typically a patient of Dr Aguila Pope  He's here today as he has redness and itching on the Right lower leg / ankle that is concerning that he might have an infection  He denies drainage, fevers  The following portions of the patient's history were reviewed and updated as appropriate:   Patient Active Problem List   Diagnosis   • Chronic migraine without aura or status migrainosus   • Essential hypertension   • Gastroesophageal reflux disease without esophagitis   • Sinus tachycardia   • Elevated AST (SGOT)   • Abnormal EKG   • Class 1 obesity due to excess calories without serious comorbidity with body mass index (BMI) of 31 0 to 31 9 in adult   • Platelets decreased (HCC)   • Numbness in feet   • Onychomycosis of toenail   • Wound of right ankle   • Varicose 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   • Chronic venous hypertension (idiopathic) with ulcer of right lower extremity (HCC)     Past Medical History:   Diagnosis Date   • Arthritis    • Heart murmur     No pluerisy   • Hypertension    • Migraine      No past surgical history on file    Social History     Socioeconomic History   • Marital status: Single     Spouse name: None   • Number of children: None   • Years of education: None   • Highest education level: None   Occupational History   • None   Tobacco Use   • Smoking status: Former     Types: Cigarettes     Quit date:      Years since quittin 0   • Smokeless tobacco: Never   Vaping Use   • Vaping Use: Never used   Substance and Sexual Activity   • Alcohol use: Yes     Comment: Whiskey and beer   • Drug use: No   • Sexual activity: None   Other Topics Concern   • None   Social History Narrative   • None Social Determinants of Health     Financial Resource Strain: Not on file   Food Insecurity: No Food Insecurity   • Worried About [address] in the Last Year: Never true   • Ran Out of Food in the Last Year: Never true   Transportation Needs: No Transportation Needs   • Lack of Transportation (Medical): No   • Lack of Transportation (Non-Medical): No   Physical Activity: Not on file   Stress: Not on file   Social Connections: Not on file   Intimate Partner Violence: Not on file   Housing Stability: Not on file        Current Outpatient Medications:   •  ibuprofen (MOTRIN) 600 mg tablet, Take 600 mg by mouth every 6 (six) hours as needed for mild pain, Disp: , Rfl:   •  Magnesium 400 MG TABS, Take 1 tablet (400 mg total) by mouth daily, Disp: 30 tablet, Rfl: 5  •  albuterol (VENTOLIN HFA) 90 mcg/act inhaler, Inhale 2 puffs every 6 (six) hours as needed for wheezing (Patient not taking: Reported on 11/28/2022), Disp: 1 Inhaler, Rfl: 0  •  betamethasone, augmented, (DIPROLENE) 0 05 % ointment, Apply topically 2 (two) times a day, Disp: 30 g, Rfl: 0  •  Galcanezumab-gnlm 120 MG/ML SOAJ, Inject 1 ml in each thigh and or stomach for a total of two injections the first time  Then one injection only every thirty days afterward (Patient not taking: Reported on 6/15/2022), Disp: 2 pen, Rfl: 3  •  SUMAtriptan (IMITREX) 50 mg tablet, Take 1 tab at migraine onset, max 2 tabs in 24 hours  Max 2 days a week (Patient not taking: Reported on 1/27/2023), Disp: 12 tablet, Rfl: 2  Family History   Problem Relation Age of Onset   • Cancer Mother    • Migraines Father    • Bipolar disorder Brother    • Migraines Maternal Grandmother       Review of Systems   Constitutional: Negative  Skin: Positive for color change and rash  Negative for wound         Allergies  Aimovig [erenumab-aooe] and Sulfa antibiotics    Objective:  /80   Pulse 85   Temp 97 8 °F (36 6 °C)   Resp 20   Ht 5' 11" (1 803 m)   Wt 88 5 kg (195 lb)   BMI 27 20 kg/m²     Physical Exam  Vitals and nursing note reviewed  Constitutional:       General: He is not in acute distress  Appearance: Normal appearance  He is not ill-appearing, toxic-appearing or diaphoretic  Pulmonary:      Effort: Pulmonary effort is normal    Skin:     Findings: Rash present  Comments: I note hyperkeratosis, xerosis, erythema, and scaling Right ankle without drainage  Neurological:      Mental Status: He is alert  Diagnosis:  1  Chronic venous hypertension (idiopathic) with ulcer of right lower extremity (HCC)  -     Wound cleansing and dressings; Future        Diagnosis ICD-10-CM Associated Orders   1  Chronic venous hypertension (idiopathic) with ulcer of right lower extremity (HCC)  I87 311 Wound cleansing and dressings    L97 [id number]            ASSESSMENT    Problems:    Chronic illness / Problem not at goal with exacerbation, progression or side effects of treatment  1  Venous stasis dermatitis        PLAN    No wound is present  I recommend he use betamethasone twice daily which should improved the dermatitis

## 2023-01-27 NOTE — PATIENT INSTRUCTIONS
Orders Placed This Encounter   Procedures    Wound cleansing and dressings     You are healed at this time  You do not need to return to the wound care center at this time    You may shower normally  Wash with mild soap and water  Do not scrub where the wound was    The tissue is still fragile    Call the wound care center should anything reopen    The Dr Is going to prescribe you a cream that you apply twice a day as ordered until the rash is gone(betamethasone)    Do not scratch     Standing Status:   Future     Standing Expiration Date:   1/27/2024

## 2023-02-15 ENCOUNTER — OFFICE VISIT (OUTPATIENT)
Dept: DENTISTRY | Facility: CLINIC | Age: 73
End: 2023-02-15

## 2023-02-15 VITALS — HEART RATE: 77 BPM | TEMPERATURE: 97.5 F | DIASTOLIC BLOOD PRESSURE: 89 MMHG | SYSTOLIC BLOOD PRESSURE: 144 MMHG

## 2023-02-15 DIAGNOSIS — K04.7 DENTAL INFECTION: Primary | ICD-10-CM

## 2023-02-15 RX ORDER — AMOXICILLIN 500 MG/1
500 CAPSULE ORAL EVERY 8 HOURS SCHEDULED
Qty: 21 CAPSULE | Refills: 0 | Status: SHIPPED | OUTPATIENT
Start: 2023-02-15 | End: 2023-02-22

## 2023-02-15 NOTE — PROGRESS NOTES
Comprehensive Exam     Haim Hernández 81 y o  male presents for a comprehensive exam  Verbal consent for treatment given in addition to the forms  Pt has allergy to sulfa drugs     Reviewed health history - Patient is ASA 1  Consents signed: Yes     Perio: Generalized  Pain Scale: 4 - UR pain on biting and random pain  Caries Assessment: medium  Radiographs: [facility]     Oral Hygiene instruction reviewed and given  Hygiene recall visits recommended to the patient  EOE: WNL  IOE: WNL  Poor oral hygiene  Heavily restored dentition  Heavy plaque build up - mainly on ant gretchen lingual      - #2 - F caries, small PARL, percussion (-), palp (-), cold (+++ no linger)   - #4 DO caries   - #6 D caries   - #9 recurrent lingual caries under bridge - small PARL  - #10 large lingual cavitated lesion - PARL  - #12 vertical fracture through root - grade 3 mobility   - #17 O and L caries  - #18 retained root   - #20 F caries  - #21 M caries   - #22 M caries  - #23 D caries  - #25 DO caries  - #26 MO caries  - #27 D caries   - #28 M caries   - #29 MO caries      Discussed txp with pt including exts of the fractured teeth #18 and #12  SRPs due to plaque and calculus build up and perio disease  Will address tooth #2 (painful tooth) after exts and SRPs are done - advised pt #2 may need RCT with outside endo or ext  Will address decay under #9 bridge and #10 restorability after SRPs  Multiple teeth have large cavities and may need RCT and crown  Discussed possibility of RPDs way down the road after infection is controlled  Pt understood  Treatment Plan:  1  Ext 12 + 18  2  SRPs 4 quads  3  #2 - reassess pain and infection  4  #9 recurrent decay under bridge - remove  5  #10 reassess for restorability   6  Resins     Rx: amox 500mg 7 days sent to pharmacy      Prognosis is Average    Referrals needed: No  Next visit: exts   NNV: SRPs

## 2023-02-27 ENCOUNTER — OFFICE VISIT (OUTPATIENT)
Dept: DENTISTRY | Facility: CLINIC | Age: 73
End: 2023-02-27

## 2023-02-27 VITALS — DIASTOLIC BLOOD PRESSURE: 87 MMHG | TEMPERATURE: 97.1 F | HEART RATE: 77 BPM | SYSTOLIC BLOOD PRESSURE: 144 MMHG

## 2023-02-27 DIAGNOSIS — K02.9 DENTAL CARIES INTO PULP: Primary | ICD-10-CM

## 2023-02-27 NOTE — PROGRESS NOTES
Universal Protocol    Other Assisting Provider: Yes, burton (assistant)    Verbal consent obtained? YES  Written consent obtained? YES    Risks, benefits and alternatives discussed?: YES    Consent given by: Patient ()    Time Out  Immediately prior to the procedure a time out was called: YES    Time Out:  1:30    A time out verifies correct patient, procedure, equipment, support staff and site/side marked as required  Patient states understanding of procedure being performed: YES    Patient's understanding of procedure matches consent: YES    Procedure consent matches procedure scheduled: YES    Test results available and properly labeled: N/A    Site  Verified with the patient  YES    Radiology Images displayed and confirmed  If images not available, report reviewed:  YES    Required items - Required blood products, implants, devices and special equipment available: YES    Patient identity confirmed:  YES    Oral Surgery    Cristiane Trujillo presents for Ext #12,18    [facility], patient denies any changes  Obtained a direct and personal consent  Risks and complications were explained  Pt agreed and consented  Consent scanned in doc center  Pre-Op BP WNL  Administered 1 cc of 2 % Lidocaine w/ 1:100,000 epi via block and 2 cc 4% articaine 1:100k epi through infiltration  ? Adequate anesthesia obtained, reflected gingiva, elevated, and extracted #12,18   Socket irrigated       Upon dismissal, patient received POI, gauze    NV: SRPS

## 2023-05-09 ENCOUNTER — RA CDI HCC (OUTPATIENT)
Dept: OTHER | Facility: HOSPITAL | Age: 73
End: 2023-05-09

## 2023-05-10 NOTE — PROGRESS NOTES
[facility]  coding opportunities       Chart reviewed, no opportunity found: CHART REVIEWED, Nagi Peterson [id number]     Patients Insurance     Medicare Insurance: [employer]

## 2023-05-11 ENCOUNTER — OFFICE VISIT (OUTPATIENT)
Dept: FAMILY MEDICINE CLINIC | Facility: CLINIC | Age: 73
End: 2023-05-11

## 2023-05-11 VITALS
WEIGHT: 207.6 LBS | BODY MASS INDEX: 29.06 KG/M2 | SYSTOLIC BLOOD PRESSURE: 130 MMHG | HEART RATE: 80 BPM | OXYGEN SATURATION: 98 % | RESPIRATION RATE: 18 BRPM | TEMPERATURE: 98 F | DIASTOLIC BLOOD PRESSURE: 82 MMHG | HEIGHT: 71 IN

## 2023-05-11 DIAGNOSIS — I10 PRIMARY HYPERTENSION: Primary | ICD-10-CM

## 2023-05-11 DIAGNOSIS — G89.29 CHRONIC PAIN OF LEFT KNEE: ICD-10-CM

## 2023-05-11 DIAGNOSIS — D69.6 PLATELETS DECREASED (HCC): ICD-10-CM

## 2023-05-11 DIAGNOSIS — G43.709 CHRONIC MIGRAINE WITHOUT AURA WITHOUT STATUS MIGRAINOSUS, NOT INTRACTABLE: ICD-10-CM

## 2023-05-11 DIAGNOSIS — L97.919 CHRONIC VENOUS HYPERTENSION (IDIOPATHIC) WITH ULCER OF RIGHT LOWER EXTREMITY (HCC): ICD-10-CM

## 2023-05-11 DIAGNOSIS — I87.311 CHRONIC VENOUS HYPERTENSION (IDIOPATHIC) WITH ULCER OF RIGHT LOWER EXTREMITY (HCC): ICD-10-CM

## 2023-05-11 DIAGNOSIS — M25.562 CHRONIC PAIN OF LEFT KNEE: ICD-10-CM

## 2023-05-11 DIAGNOSIS — I83.11 VARICOSE VEINS OF RIGHT LOWER EXTREMITY WITH INFLAMMATION: ICD-10-CM

## 2023-05-11 PROBLEM — E66.811 CLASS 1 OBESITY DUE TO EXCESS CALORIES WITHOUT SERIOUS COMORBIDITY WITH BODY MASS INDEX (BMI) OF 31.0 TO 31.9 IN ADULT: Status: RESOLVED | Noted: 2019-01-28 | Resolved: 2023-05-11

## 2023-05-11 PROBLEM — E66.09 CLASS 1 OBESITY DUE TO EXCESS CALORIES WITHOUT SERIOUS COMORBIDITY WITH BODY MASS INDEX (BMI) OF 31.0 TO 31.9 IN ADULT: Status: RESOLVED | Noted: 2019-01-28 | Resolved: 2023-05-11

## 2023-05-11 RX ORDER — OMEGA-3S/DHA/EPA/FISH OIL/D3 300MG-1000
400 CAPSULE ORAL DAILY
COMMUNITY

## 2023-05-11 NOTE — PROGRESS NOTES
Name: Michelle Solomon      : 1950      MRN: [id number]  Encounter Provider: Chastity Sandoval MD  Encounter Date: 2023   Encounter department: [address]  Chief Complaint   Patient presents with   • Follow-up     4 month  Health Maintenance   Topic Date Due   • Hepatitis C Screening  Never done   • COVID-19 Vaccine (3 - Booster for Pfizer series) 2021   • Colorectal Cancer Screening  2022   • BMI: Followup Plan  06/15/2023   • Fall Risk  2023   • Depression Screening  2023   • Medicare Annual Wellness Visit (AWV)  2023   • BMI: Adult  2024   • Pneumococcal Vaccine: 65+ Years  Completed   • Influenza Vaccine  Completed   • HIB Vaccine  Aged Out   • IPV Vaccine  Aged Out   • Hepatitis A Vaccine  Aged Out   • Meningococcal ACWY Vaccine  Aged Out   • HPV Vaccine  Aged Out       Assessment & Plan     1  Primary hypertension  Assessment & Plan:  BP remains stable  Reports no chest pain, shortness of breath, dizziness  He does not take any blood pressure medication currently  Recommended to follow a low-sodium diet, regular exercise  Check labs  Reprinted prescription for blood work from last year  2  Chronic migraine without aura without status migrainosus, not intractable  Assessment & Plan:  Headaches improved  Continue Magnesium 400 mg daily for migraine prophylaxis  Encouraged to stay well-hydrated  Avoid migraine triggers  3  Varicose 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  Assessment & Plan:  Continue to use ACE wrap  4  Platelets decreased ([address] )  Assessment & Plan:  Check CBC with dif as ordered last year  5  Chronic venous hypertension (idiopathic) with ulcer of right lower extremity (HCC)  Assessment & Plan:  Venous stasis ulcer on right lateral ankle healed  6  Chronic pain of left knee  Assessment & Plan:  Left knee pain is likely secondary to osteoarthritis      Will order x-ray of the left knee  Recommended to schedule evaluation by orthopedic surgeon  Orders:  -     XR knee 3 vw left non injury; Future; Expected date: 05/11/2023  -     Ambulatory Referral to Orthopedic Surgery; Future    BMI Counseling: Body mass index is 28 95 kg/m²  The BMI is above normal  Nutrition recommendations include decreasing portion sizes, encouraging healthy choices of fruits and vegetables, decreasing fast food intake, consuming healthier snacks, limiting drinks that contain sugar, moderation in carbohydrate intake, increasing intake of lean protein, reducing intake of saturated and trans fat and reducing intake of cholesterol  Exercise recommendations include exercising 3-5 times per week  No pharmacotherapy was ordered  Rationale for BMI follow-up plan is due to patient being overweight or obese  Schedule follow-up visit, Medicare AWV in 12/2023  Subjective      HPI     Patient presents for routine follow-up office visit  Last seen in December 2022  PMHx: HTN, sinus tachycardia, chronic migraine headache, GERD, OA, varicose veins, venous stasis dermatitis, chronic venous insufficiency with venous stasis ulcer R LE      Reviewed current medications  Patient did not go for blood work as ordered last year  HTN - BP remains stable  Patient denies chest pain, shortness of breath, dizziness  Migraine headaches - reports improvement in  headaches  Currently taking Magnesium 400 mg daily  Patient states that the ulcer on right ankle healed  He stopped going to wound care center  Followed by podiatry Dr Heidi Calabrese  C/o numbness in both feet   Walks with a cane  Reports no recent falls  C/o chronic pain in left knee increasing with ambulation  Denies knee injury  Quit smoking 18 years ago  Drinks 2-3 drinks of whiskey daily  Declined colonoscopy, had negative FIT in December 2021  Review of Systems   Constitutional: Positive for fatigue   Negative for activity change, appetite change, chills and fever  HENT: Negative for congestion, ear pain, hearing loss, sore throat and trouble swallowing  Eyes: Positive for photophobia  Negative for pain, discharge, redness, itching and visual disturbance  Respiratory: Negative for cough, chest tightness, shortness of breath and wheezing  Cardiovascular: Negative for chest pain, palpitations and leg swelling  Gastrointestinal: Negative for abdominal pain, blood in stool, constipation, diarrhea, nausea and vomiting  Genitourinary: Negative for difficulty urinating, dysuria and hematuria  Nocturia    Musculoskeletal: Positive for arthralgias (left knee) and gait problem  Negative for back pain (ambulates with a cane) and joint swelling  Skin: Negative for rash  Neurological: Positive for numbness and headaches (improved)  Negative for dizziness and syncope  Light-headedness: in feet  Hematological: Negative  Psychiatric/Behavioral: Negative for dysphoric mood and sleep disturbance  The patient is not nervous/anxious  Current Outpatient Medications on File Prior to Visit   Medication Sig   • cholecalciferol (VITAMIN D3) 400 units tablet Take 400 Units by mouth daily   • ibuprofen (MOTRIN) 600 mg tablet Take 600 mg by mouth every 6 (six) hours as needed for mild pain   • Magnesium 400 MG TABS Take 1 tablet (400 mg total) by mouth daily   • albuterol (VENTOLIN HFA) 90 mcg/act inhaler Inhale 2 puffs every 6 (six) hours as needed for wheezing (Patient not taking: Reported on 11/28/2022)   • betamethasone, augmented, (DIPROLENE) 0 05 % ointment Apply topically 2 (two) times a day (Patient not taking: Reported on 5/11/2023)   • Galcanezumab-gnlm 120 MG/ML SOAJ Inject 1 ml in each thigh and or stomach for a total of two injections the first time   Then one injection only every thirty days afterward (Patient not taking: Reported on 6/15/2022)   • SUMAtriptan (IMITREX) 50 mg tablet Take 1 tab at "migraine onset, max 2 tabs in 24 hours  Max 2 days a week (Patient not taking: Reported on 1/27/2023)       Objective     /82   Pulse 80   Temp 98 °F (36 7 °C) (Tympanic)   Resp 18   Ht 5' 11\" (1 803 m)   Wt 94 2 kg (207 lb 9 6 oz)   SpO2 98%   BMI 28 95 kg/m²     Physical Exam  Vitals and nursing note reviewed  Constitutional:       Appearance: Normal appearance  Comments: Overweight   HENT:      Head: Normocephalic and atraumatic  Eyes:      Conjunctiva/sclera: Conjunctivae normal       Pupils: Pupils are equal, round, and reactive to light  Neck:      Vascular: No carotid bruit  Cardiovascular:      Rate and Rhythm: Normal rate and regular rhythm  Heart sounds: No murmur heard  Comments: R LE varicose veins  Pulmonary:      Effort: Pulmonary effort is normal       Breath sounds: Normal breath sounds  Abdominal:      General: Bowel sounds are normal  There is no distension  Palpations: Abdomen is soft  Tenderness: There is no abdominal tenderness  Musculoskeletal:      Cervical back: Normal range of motion and neck supple  Right lower leg: No edema  Left lower leg: No edema  Comments: Left knee: tender to palpation  No joint effusion  Skin:     General: Skin is warm and dry  Comments: Venous stasis dermatitis changes on BL LE   Neurological:      General: No focal deficit present  Mental Status: He is alert and oriented to person, place, and time     Psychiatric:         Mood and Affect: Mood normal        Chastity Sandoval MD  "

## 2023-05-12 NOTE — ASSESSMENT & PLAN NOTE
BP remains stable  Reports no chest pain, shortness of breath, dizziness  He does not take any blood pressure medication currently  Recommended to follow a low-sodium diet, regular exercise  Check labs  Reprinted prescription for blood work from last year

## 2023-05-12 NOTE — ASSESSMENT & PLAN NOTE
Left knee pain is likely secondary to osteoarthritis  Will order x-ray of the left knee  Recommended to schedule evaluation by orthopedic surgeon

## 2023-05-12 NOTE — ASSESSMENT & PLAN NOTE
Headaches improved  Continue Magnesium 400 mg daily for migraine prophylaxis  Encouraged to stay well-hydrated  Avoid migraine triggers

## 2023-07-18 ENCOUNTER — OFFICE VISIT (OUTPATIENT)
Dept: DENTISTRY | Facility: CLINIC | Age: 73
End: 2023-07-18

## 2023-07-18 VITALS — HEART RATE: 47 BPM | SYSTOLIC BLOOD PRESSURE: 156 MMHG | DIASTOLIC BLOOD PRESSURE: 72 MMHG

## 2023-07-18 DIAGNOSIS — K05.30 PERIODONTITIS: Primary | ICD-10-CM

## 2023-07-18 PROCEDURE — D4341 PERIODONTAL SCALING AND ROOT PLANING - 4 OR MORE TEETH PER QUADRANT: HCPCS

## 2023-07-18 NOTE — DENTAL PROCEDURE DETAILS
SRP in quads : UR and LR, was able to accomodate for 2 quads due to extra time in schedule. Reviewed meds/hhx in Epic  ASA: 2    Anesthesia-  Topical gel benzocaine 20% was applied to tissue. Max R Buccal infiltration,short, 3/4 carp 2% Lido w/ epi 1:100,000  Grazyna R Buccal infiltration, short, 1/2 carp 4% Septo w/ epi 1:100,000  Neg aspirations and no complications for all. Cavitron and hand instruments used  Bleeding: heavy  Supra/sub calculus was removed from tooth surfaces  Irrigated w/ .12% Chlorhexidine Gluconate    OHI: reviewed with the patient the need to maintain proper oral hygiene regimen at home. Brushing 2x/day for 2 minutes, flossing subgingivally daily and mouthrinse 1-2x/day for 60 seconds. Recommend otc pain reliever when he gets home prior to local anesthesia wearing off. Recommend warm salt water rinses. Recommend excellent homecare regimen especially on Right side post SRP. Advised patient periodontal disease is an oral disease we can treat and maintain but cannot cure. Without proper dental visits and proper at home dental care this disease may return. Patient understands. Following scaling and root planing, a 4-6 week Perio Re-eval is recommended, to evaluate whether patient will need further periodontal surgery. Recommend maintaining 3/4 month recalls.     NV: UL SRP

## 2023-08-15 ENCOUNTER — VBI (OUTPATIENT)
Dept: ADMINISTRATIVE | Facility: OTHER | Age: 73
End: 2023-08-15

## 2023-08-17 ENCOUNTER — VBI (OUTPATIENT)
Dept: ADMINISTRATIVE | Facility: OTHER | Age: 73
End: 2023-08-17

## 2023-09-17 ENCOUNTER — HOSPITAL ENCOUNTER (EMERGENCY)
Facility: HOSPITAL | Age: 73
Discharge: HOME | End: 2023-09-17
Attending: EMERGENCY MEDICINE | Admitting: EMERGENCY MEDICINE
Payer: COMMERCIAL

## 2023-09-17 ENCOUNTER — APPOINTMENT (EMERGENCY)
Dept: RADIOLOGY | Facility: HOSPITAL | Age: 73
End: 2023-09-17
Payer: COMMERCIAL

## 2023-09-17 VITALS
RESPIRATION RATE: 18 BRPM | SYSTOLIC BLOOD PRESSURE: 148 MMHG | DIASTOLIC BLOOD PRESSURE: 80 MMHG | BODY MASS INDEX: 27.89 KG/M2 | TEMPERATURE: 98.8 F | HEART RATE: 70 BPM | WEIGHT: 200 LBS | OXYGEN SATURATION: 97 %

## 2023-09-17 DIAGNOSIS — L03.116 CELLULITIS OF LEFT FOOT: Primary | ICD-10-CM

## 2023-09-17 LAB
ANION GAP SERPL CALCULATED.3IONS-SCNC: 5 MMOL/L
BASOPHILS # BLD AUTO: 0.1 THOUSANDS/ÂΜL (ref 0–0.1)
BASOPHILS NFR BLD AUTO: 2 % (ref 0–1)
BUN SERPL-MCNC: 21 MG/DL (ref 5–25)
CALCIUM SERPL-MCNC: 9.1 MG/DL (ref 8.4–10.2)
CHLORIDE SERPL-SCNC: 106 MMOL/L (ref 96–108)
CO2 SERPL-SCNC: 28 MMOL/L (ref 21–32)
CREAT SERPL-MCNC: 1.2 MG/DL (ref 0.6–1.3)
CRP SERPL QL: 1.3 MG/L
EOSINOPHIL # BLD AUTO: 0.15 THOUSAND/ÂΜL (ref 0–0.61)
EOSINOPHIL NFR BLD AUTO: 3 % (ref 0–6)
ERYTHROCYTE [DISTWIDTH] IN BLOOD BY AUTOMATED COUNT: 12.4 % (ref 11.6–15.1)
ERYTHROCYTE [SEDIMENTATION RATE] IN BLOOD: 3 MM/HOUR (ref 0–19)
GFR SERPL CREATININE-BSD FRML MDRD: 60 ML/MIN/1.73SQ M
GLUCOSE SERPL-MCNC: 91 MG/DL (ref 65–140)
HCT VFR BLD AUTO: 46.3 % (ref 36.5–49.3)
HGB BLD-MCNC: 16 G/DL (ref 12–17)
IMM GRANULOCYTES # BLD AUTO: 0.01 THOUSAND/UL (ref 0–0.2)
IMM GRANULOCYTES NFR BLD AUTO: 0 % (ref 0–2)
LACTATE SERPL-SCNC: 0.8 MMOL/L (ref 0.5–2)
LYMPHOCYTES # BLD AUTO: 1.68 THOUSANDS/ÂΜL (ref 0.6–4.47)
LYMPHOCYTES NFR BLD AUTO: 28 % (ref 14–44)
MCH RBC QN AUTO: 31.3 PG (ref 26.8–34.3)
MCHC RBC AUTO-ENTMCNC: 34.6 G/DL (ref 31.4–37.4)
MCV RBC AUTO: 91 FL (ref 82–98)
MONOCYTES # BLD AUTO: 0.45 THOUSAND/ÂΜL (ref 0.17–1.22)
MONOCYTES NFR BLD AUTO: 8 % (ref 4–12)
NEUTROPHILS # BLD AUTO: 3.58 THOUSANDS/ÂΜL (ref 1.85–7.62)
NEUTS SEG NFR BLD AUTO: 59 % (ref 43–75)
NRBC BLD AUTO-RTO: 0 /100 WBCS
PLATELET # BLD AUTO: 149 THOUSANDS/UL (ref 149–390)
PMV BLD AUTO: 10.4 FL (ref 8.9–12.7)
POTASSIUM SERPL-SCNC: 4.5 MMOL/L (ref 3.5–5.3)
RBC # BLD AUTO: 5.11 MILLION/UL (ref 3.88–5.62)
SODIUM SERPL-SCNC: 139 MMOL/L (ref 135–147)
WBC # BLD AUTO: 5.97 THOUSAND/UL (ref 4.31–10.16)

## 2023-09-17 PROCEDURE — 85652 RBC SED RATE AUTOMATED: CPT

## 2023-09-17 PROCEDURE — 83605 ASSAY OF LACTIC ACID: CPT

## 2023-09-17 PROCEDURE — 99283 EMERGENCY DEPT VISIT LOW MDM: CPT

## 2023-09-17 PROCEDURE — 36415 COLL VENOUS BLD VENIPUNCTURE: CPT

## 2023-09-17 PROCEDURE — 73630 X-RAY EXAM OF FOOT: CPT

## 2023-09-17 PROCEDURE — 86140 C-REACTIVE PROTEIN: CPT

## 2023-09-17 PROCEDURE — 85025 COMPLETE CBC W/AUTO DIFF WBC: CPT

## 2023-09-17 PROCEDURE — 87040 BLOOD CULTURE FOR BACTERIA: CPT

## 2023-09-17 PROCEDURE — 80048 BASIC METABOLIC PNL TOTAL CA: CPT

## 2023-09-17 RX ORDER — CEPHALEXIN 500 MG/1
500 CAPSULE ORAL EVERY 6 HOURS SCHEDULED
Qty: 28 CAPSULE | Refills: 0 | Status: SHIPPED | OUTPATIENT
Start: 2023-09-17 | End: 2023-09-24

## 2023-09-17 RX ORDER — CEPHALEXIN 500 MG/1
500 CAPSULE ORAL ONCE
Status: COMPLETED | OUTPATIENT
Start: 2023-09-17 | End: 2023-09-17

## 2023-09-17 RX ADMIN — CEPHALEXIN 500 MG: 500 CAPSULE ORAL at 14:33

## 2023-09-17 NOTE — ED PROVIDER NOTES
History  Chief Complaint   Patient presents with   • Wound Infection     Pt reports callous on left great toe, pt stated that it started out as a callous. Over the last week pt noticed it started to blister. The blisters started to break and redness started to expand over the toe. Pt reports at [facility] and asked a nurse to look at it who told him to come to ED to have it checked. 72-year-old male with a past medical history of of right ankle wound, hypertension, onychomycosis of the toenails, varicose veins, left great toe hyperkeratosis, presents emergency department over concern of a possible skin infection of his left foot. States has been erythematous for the last few days he has noticed some bullae that have ruptured a clear fluid. He denies any fevers, chills. Denies spreading of the rash. Is localized to his big toe and left midfoot. Prior to Admission Medications   Prescriptions Last Dose Informant Patient Reported? Taking? Galcanezumab-gnlm 120 MG/ML SOAJ   No No   Sig: Inject 1 ml in each thigh and or stomach for a total of two injections the first time. Then one injection only every thirty days afterward   Patient not taking: Reported on 6/15/2022   Magnesium 400 MG TABS  Self No No   Sig: Take 1 tablet (400 mg total) by mouth daily   SUMAtriptan (IMITREX) 50 mg tablet   No No   Sig: Take 1 tab at migraine onset, max 2 tabs in 24 hours.  Max 2 days a week   Patient not taking: Reported on 1/27/2023   albuterol (VENTOLIN HFA) 90 mcg/act inhaler  Self No No   Sig: Inhale 2 puffs every 6 (six) hours as needed for wheezing   Patient not taking: Reported on 11/28/2022   betamethasone, augmented, (DIPROLENE) 0.05 % ointment   No No   Sig: Apply topically 2 (two) times a day   Patient not taking: Reported on 5/11/2023   cholecalciferol (VITAMIN D3) 400 units tablet   Yes No   Sig: Take 400 Units by mouth daily   ibuprofen (MOTRIN) 600 mg tablet   Yes No   Sig: Take 600 mg by mouth every 6 (six) hours as needed for mild pain      Facility-Administered Medications: None       Past Medical History:   Diagnosis Date   • Arthritis    • Heart murmur     No pluerisy   • Hypertension    • Migraine        History reviewed. No pertinent surgical history. Family History   Problem Relation Age of Onset   • Cancer Mother    • Migraines Father    • Bipolar disorder Brother    • Migraines Maternal Grandmother      I have reviewed and agree with the history as documented. E-Cigarette/Vaping   • E-Cigarette Use Never User      E-Cigarette/Vaping Substances     Social History     Tobacco Use   • Smoking status: Former     Types: Cigarettes     Quit date:      Years since quittin.7   • Smokeless tobacco: Never   Vaping Use   • Vaping Use: Never used   Substance Use Topics   • Alcohol use: Not Currently     Comment: Whiskey and beer   • Drug use: No        Review of Systems   Constitutional: Negative for chills and fever. Skin: Positive for rash and wound. All other systems reviewed and are negative. Physical Exam  ED Triage Vitals [23 1134]   Temperature Pulse Respirations Blood Pressure SpO2   98.8 °F (37.1 °C) 72 18 150/87 95 %      Temp Source Heart Rate Source Patient Position - Orthostatic VS BP Location FiO2 (%)   Temporal Monitor Sitting Left arm --      Pain Score       No Pain             Orthostatic Vital Signs  Vitals:    23 1134   BP: 150/87   Pulse: 72   Patient Position - Orthostatic VS: Sitting       Physical Exam  Vitals and nursing note reviewed. Constitutional:       General: He is not in acute distress. Appearance: He is well-developed. HENT:      Head: Normocephalic and atraumatic. Eyes:      Conjunctiva/sclera: Conjunctivae normal.   Cardiovascular:      Rate and Rhythm: Normal rate and regular rhythm. Heart sounds: No murmur heard. Pulmonary:      Effort: Pulmonary effort is normal. No respiratory distress.       Breath sounds: Normal breath sounds. Abdominal:      Palpations: Abdomen is soft. Tenderness: There is no abdominal tenderness. Musculoskeletal:         General: No swelling. Cervical back: Neck supple. Right lower leg: No edema. Left lower leg: No edema. Feet:      Comments: Erythema and rash over the left great toe and left midfoot. Bullae over left foot  Skin:     General: Skin is warm and dry. Capillary Refill: Capillary refill takes less than 2 seconds. Findings: Rash present. Neurological:      General: No focal deficit present. Mental Status: He is alert and oriented to person, place, and time. Mental status is at baseline. Cranial Nerves: No cranial nerve deficit. Psychiatric:         Mood and Affect: Mood normal.                     ED Medications  Medications   cephalexin (KEFLEX) capsule 500 mg (500 mg Oral Given 9/17/23 1433)       Diagnostic Studies  Results Reviewed     Procedure Component Value Units Date/Time    Lactic acid, plasma (w/reflex if result > 2.0) [[id number]]  (Normal) Collected: 09/17/23 1353    Lab Status: Final result Specimen: Blood from Arm, Left Updated: 09/17/23 1427     LACTIC ACID 0.8 mmol/L     Narrative:      Result may be elevated if tourniquet was used during collection.     Basic metabolic panel [[id number]] Collected: 09/17/23 1353    Lab Status: Final result Specimen: Blood from Arm, Left Updated: 09/17/23 1427     Sodium 139 mmol/L      Potassium 4.5 mmol/L      Chloride 106 mmol/L      CO2 28 mmol/L      ANION GAP 5 mmol/L      BUN 21 mg/dL      Creatinine 1.20 mg/dL      Glucose 91 mg/dL      Calcium 9.1 mg/dL      eGFR 60 ml/min/1.73sq m     Narrative:      [place] guidelines for Chronic Kidney Disease (CKD):   •  Stage 1 with normal or high GFR (GFR > 90 mL/min/1.73 square meters)  •  Stage 2 Mild CKD (GFR = 60-89 mL/min/1.73 square meters)  •  Stage 3A Moderate CKD (GFR = 45-59 mL/min/1.73 square meters)  •  Stage 3B Moderate CKD (GFR = 30-44 mL/min/1.73 square meters)  •  Stage 4 Severe CKD (GFR = 15-29 mL/min/1.73 square meters)  •  Stage 5 End Stage CKD (GFR <15 mL/min/1.73 square meters)  Note: GFR calculation is accurate only with a steady state creatinine    C-reactive protein [[id number]]  (Normal) Collected: 09/17/23 1353    Lab Status: Final result Specimen: Blood from Arm, Left Updated: 09/17/23 1427     CRP 1.3 mg/L     CBC and differential [[id number]]  (Abnormal) Collected: 09/17/23 1353    Lab Status: Final result Specimen: Blood from Arm, Left Updated: 09/17/23 1416     WBC 5.97 Thousand/uL      RBC 5.11 Million/uL      Hemoglobin 16.0 g/dL      Hematocrit 46.3 %      MCV 91 fL      MCH 31.3 pg      MCHC 34.6 g/dL      RDW 12.4 %      MPV 10.4 fL      Platelets 902 Thousands/uL      nRBC 0 /100 WBCs      Neutrophils Relative 59 %      Immat GRANS % 0 %      Lymphocytes Relative 28 %      Monocytes Relative 8 %      Eosinophils Relative 3 %      Basophils Relative 2 %      Neutrophils Absolute 3.58 Thousands/µL      Immature Grans Absolute 0.01 Thousand/uL      Lymphocytes Absolute 1.68 Thousands/µL      Monocytes Absolute 0.45 Thousand/µL      Eosinophils Absolute 0.15 Thousand/µL      Basophils Absolute 0.10 Thousands/µL     Sedimentation rate, automated [[id number]]  (Normal) Collected: 09/17/23 1353    Lab Status: Final result Specimen: Blood from Arm, Left Updated: 09/17/23 1408     Sed Rate 3 mm/hour     Blood culture [[id number]] Collected: 09/17/23 1353    Lab Status: In process Specimen: Blood from Arm, Left Updated: 09/17/23 1401    Blood culture [[id number]] Collected: 09/17/23 1353    Lab Status:  In process Specimen: Blood from Arm, Right Updated: 09/17/23 1401                 XR foot 3+ vw left    (Results Pending)         Procedures  Procedures      ED Course               Identification of Seniors at [facility] Most Recent Value   (ISAR) Identification of Seniors at Risk    Before the illness or injury that brought you to the Emergency, did you need someone to help you on a regular basis? 0 Filed at: 09/17/2023 1136   In the last 24 hours, have you needed more help than usual? 0 Filed at: 09/17/2023 1136   Have you been hospitalized for one or more nights during the past 6 months? 0 Filed at: 09/17/2023 1136   In general, do you see well? 0 Filed at: 09/17/2023 1136   In general, do you have serious problems with your memory? 0 Filed at: 09/17/2023 1136   Do you take more than three different medications every day? 0 Filed at: 09/17/2023 1136   ISAR Score 0 Filed at: 09/17/2023 1136                              Medical Decision Making  70-year-old male presents emergency department complaining of left foot pain and rash    DDx: Osteomyelitis, cellulitis, less likely necrotizing fasciitis or NSTI    Plan: BMP, CBC, inflammatory markers, x-ray, blood cultures, reeval    Patient started on antibiotics for cellulitis. Work-up performed in the emergency department is negative for osteomyelitis, does not appear to be NSTI. Left foot x-ray within normal limits. Blood work is unremarkable. Inflammatory markers within normal limits. Patient tolerated Keflex in emergency department. Patient stable for discharge home, provided strict return precautions, wound bandaged, instructed follow-up with wound care, PCP, podiatry, verbalized understanding and subsequently discharged home. Amount and/or Complexity of Data Reviewed  Labs: ordered. Radiology: ordered. Risk  Prescription drug management.             Disposition  Final diagnoses:   Cellulitis of left foot     Time reflects when diagnosis was documented in both MDM as applicable and the Disposition within this note     Time User Action Codes Description Comment    9/17/2023  3:33 PM Vandana Palafox Add [C63.520] Cellulitis of left foot       ED Disposition     ED Disposition   Discharge    Condition   Stable    Date/Time   Sun Sep 17, 2023  3:51 PM Comment   Ventura Jessica discharge to home/self care. Follow-up Information     Follow up With Specialties Details Why Contact Info Additional Information    Bishnu Pena MD Family Medicine Call in 1 day  [facility] [address]  [phone]       Ector Heranndez DPM Podiatry, [facility] Call in 1 day  [address] [facility]  [phone]       [address] [place] [address] Emergency Department Emergency Medicine Go to  If symptoms worsen [address] [facility] Emergency Department, [address] [place] [address]          Patient's Medications   Discharge Prescriptions    CEPHALEXIN (KEFLEX) 500 MG CAPSULE    Take 1 capsule (500 mg total) by mouth every 6 (six) hours for 7 days       Start Date: 9/17/2023 End Date: 9/24/2023       Order Dose: 500 mg       Quantity: 28 capsule    Refills: 0     No discharge procedures on file. PDMP Review     None           ED Provider  Attending physically available and evaluated Ventura Jessica. I managed the patient along with the ED Attending.     Electronically Signed by         Zander Franco DO  09/17/23 7528

## 2023-09-17 NOTE — ED ATTENDING ATTESTATION
9/17/2023  ITheodore DO, saw and evaluated the patient. I have discussed the patient with the resident/non-physician practitioner and agree with the resident's/non-physician practitioner's findings, Plan of Care, and MDM as documented in the resident's/non-physician practitioner's note, except where noted. All available labs and Radiology studies were reviewed. I was present for key portions of any procedure(s) performed by the resident/non-physician practitioner and I was immediately available to provide assistance. At this point I agree with the current assessment done in the Emergency Department. I have conducted an independent evaluation of this patient a history and physical is as follows:  Medical Decision Making  41M with noted foot infection. No pain noted, worsening in blisters and wound. + redness - warmth. ddx- herpes zoster, cellulitis, osteomyelitis     Amount and/or Complexity of Data Reviewed  Labs: ordered. Radiology: ordered. Risk  Prescription drug management. All labs reviewed and interpreted by myself   All radiology studies independently viewed by me and interpreted by myself     XR foot 3+ vw left   Final Result      No acute osseous abnormality. Soft tissue swelling about the great toe.             Workstation performed: [id number]             Labs Reviewed   CBC AND DIFFERENTIAL - Abnormal       Result Value Ref Range Status    WBC 5.97  4.31 - 10.16 Thousand/uL Final    RBC 5.11  3.88 - 5.62 Million/uL Final    Hemoglobin 16.0  12.0 - 17.0 g/dL Final    Hematocrit 46.3  36.5 - 49.3 % Final    MCV 91  82 - 98 fL Final    MCH 31.3  26.8 - 34.3 pg Final    MCHC 34.6  31.4 - 37.4 g/dL Final    RDW 12.4  11.6 - 15.1 % Final    MPV 10.4  8.9 - 12.7 fL Final    Platelets 703  518 - 390 Thousands/uL Final    nRBC 0  /100 WBCs Final    Neutrophils Relative 59  43 - 75 % Final    Immat GRANS % 0  0 - 2 % Final    Lymphocytes Relative 28  14 - 44 % Final    Monocytes Relative 8  4 - 12 % Final    Eosinophils Relative 3  0 - 6 % Final    Basophils Relative 2 (*) 0 - 1 % Final    Neutrophils Absolute 3.58  1.85 - 7.62 Thousands/µL Final    Immature Grans Absolute 0.01  0.00 - 0.20 Thousand/uL Final    Lymphocytes Absolute 1.68  0.60 - 4.47 Thousands/µL Final    Monocytes Absolute 0.45  0.17 - 1.22 Thousand/µL Final    Eosinophils Absolute 0.15  0.00 - 0.61 Thousand/µL Final    Basophils Absolute 0.10  0.00 - 0.10 Thousands/µL Final   LACTIC ACID, PLASMA (W/REFLEX IF RESULT > 2.0) - Normal    LACTIC ACID 0.8  0.5 - 2.0 mmol/L Final    Narrative:     Result may be elevated if tourniquet was used during collection. SEDIMENTATION RATE - Normal    Sed Rate 3  0 - 19 mm/hour Final   C-REACTIVE PROTEIN - Normal    CRP 1.3  <3.0 mg/L Final   BASIC METABOLIC PANEL    Sodium 836  135 - 147 mmol/L Final    Potassium 4.5  3.5 - 5.3 mmol/L Final    Chloride 106  96 - 108 mmol/L Final    CO2 28  21 - 32 mmol/L Final    ANION GAP 5  mmol/L Final    BUN 21  5 - 25 mg/dL Final    Creatinine 1.20  0.60 - 1.30 mg/dL Final    Comment: Standardized to IDMS reference method    Glucose 91  65 - 140 mg/dL Final    Comment: If the patient is fasting, the ADA then defines impaired fasting glucose as > 100 mg/dL and diabetes as > or equal to 123 mg/dL.     Calcium 9.1  8.4 - 10.2 mg/dL Final    eGFR 60  ml/min/1.73sq m Final    Narrative:     [place] guidelines for Chronic Kidney Disease (CKD):   •  Stage 1 with normal or high GFR (GFR > 90 mL/min/1.73 square meters)  •  Stage 2 Mild CKD (GFR = 60-89 mL/min/1.73 square meters)  •  Stage 3A Moderate CKD (GFR = 45-59 mL/min/1.73 square meters)  •  Stage 3B Moderate CKD (GFR = 30-44 mL/min/1.73 square meters)  •  Stage 4 Severe CKD (GFR = 15-29 mL/min/1.73 square meters)  •  Stage 5 End Stage CKD (GFR <15 mL/min/1.73 square meters)  Note: GFR calculation is accurate only with a steady state creatinine       Clinical Impression:    Final diagnoses:   Cellulitis of left foot       ED Course         Critical Care Time  Procedures

## 2023-09-17 NOTE — DISCHARGE INSTRUCTIONS
Janes Benson was seen and evaluated today in the emergency department over your concern of left foot infection. The workup that we performed showed left foot cellulitis. Please return to the emergency department if you experience any of your symptoms, inability to walk, severe pain or any other signs and symptoms that may be concerning to you. Please follow-up with your primary care doctor within 1 day. All questions were answered prior to discharge. Thank you for choosing [facility] for your care. Follow-up with wound care, PCP, podiatry within 1 week.

## 2023-09-19 ENCOUNTER — TELEPHONE (OUTPATIENT)
Age: 73
End: 2023-09-19

## 2023-09-19 NOTE — TELEPHONE ENCOUNTER
Caller: Doris Vyas     Doctor/Office:Mray/ Yennifer MONTOYA#: [phone]    Escalation: Appointment Patient has a infected ingrown toenail was at that th e ED they did put him on a antibiotic I sched him a appt for 10/ 18 he wanted to know if there is anyway he could get in sooner.   Please advise thank you

## 2023-09-20 ENCOUNTER — OFFICE VISIT (OUTPATIENT)
Dept: PODIATRY | Facility: CLINIC | Age: 73
End: 2023-09-20
Payer: COMMERCIAL

## 2023-09-20 VITALS
HEART RATE: 71 BPM | DIASTOLIC BLOOD PRESSURE: 77 MMHG | HEIGHT: 71 IN | SYSTOLIC BLOOD PRESSURE: 156 MMHG | WEIGHT: 199.8 LBS | BODY MASS INDEX: 27.97 KG/M2

## 2023-09-20 DIAGNOSIS — L97.521 ULCER OF TOE OF LEFT FOOT, LIMITED TO BREAKDOWN OF SKIN (HCC): Primary | ICD-10-CM

## 2023-09-20 PROBLEM — S91.001A WOUND OF RIGHT ANKLE: Status: RESOLVED | Noted: 2022-06-15 | Resolved: 2023-09-20

## 2023-09-20 PROCEDURE — 99213 OFFICE O/P EST LOW 20 MIN: CPT | Performed by: PODIATRIST

## 2023-09-20 PROCEDURE — 11042 DBRDMT SUBQ TIS 1ST 20SQCM/<: CPT | Performed by: PODIATRIST

## 2023-09-20 NOTE — PROGRESS NOTES
This patient was seen on 9/20/23. My role is Foot , Ankle, and Wound Specialist    SUBJECTIVE    Chief Complaint:  Infection of foot     Patient ID: Malachi Friday is a 72 y.o. male. Brodie Jacques is known to me from past treatment of foot infections. He's been seeing dr. Kash Chapman for callous care q 2 months. He developed a cellulitis of the foot (left) and went to the ER Monday and was given Keflex. He's been taking it and notes a reduction in tenderness and redness to the foot. He denies malaise, fevers today. The following portions of the patient's history were reviewed and updated as appropriate: allergies, current medications, past family history, past medical history, past social history, past surgical history and problem list.    Review of Systems   Constitutional: Negative. Respiratory: Negative. Skin: Positive for color change. OBJECTIVE      /77   Pulse 71   Ht 5' 11" (1.803 m)   Wt 90.6 kg (199 lb 12.8 oz)   BMI 27.87 kg/m²     Foot/Ankle Musculoskeletal Exam    General    Neurological: alert       Physical Exam  Vitals and nursing note reviewed. Constitutional:       General: He is not in acute distress. Appearance: Normal appearance. He is not ill-appearing, toxic-appearing or diaphoretic. Pulmonary:      Effort: Pulmonary effort is normal.   Skin:     Capillary Refill: Capillary refill takes less than 2 seconds. Findings: Erythema present. Comments: I note a large callous medial Left hallux which is hemorrhagic. After debridement of the callous, I note a small 2mm ulcer which was likely the portal of entry for the infection. I note a fading erythema dorsal foot. No signs of abscess. Neurological:      General: No focal deficit present. Mental Status: He is alert and oriented to person, place, and time.              ASSESSMENT     Diagnoses and all orders for this visit:    Ulcer of toe of left foot, limited to breakdown of skin ([address])         Problem List Items Addressed This Visit        Musculoskeletal and Integument    Ulcer of toe of left foot, limited to breakdown of skin ([address]) - Primary           PLAN        A formal timeout including patient identification, laterality and [facility]. Procedure Performed: Debridement of subcutaneous tissue- >20 sq cm (26167). Under aseptic technique, the wound was thoroughly explored and bluntly probed for undermining, deep sinus tracts and bone involvement. Sterile instrumentation including scalpel, forceps and curette used to excise the clearly delineated devitalized subcutaneous tissue (fibrotic tissue and necrotic non-viable portions of fat) exposing viable tissue. After debridement, bleeding in the wound bed base was noted indicated viable subcutaneous tissue had been exposed. Bleeding controlled with gentle pressure. Patient tolerated debridement without complications. The wound was dressed with bacitracin and a DSD to be changed daily by patient. I instructed him to finish his entire keflex Rx. Wound dressing technique and frequency described to patient. I am to be called if any signs of infection develop such as erythema, increased wound size or significant change in appearance of wound, odor, pain, increased or change in color of drainage, swelling, fever, chills, nightsweats. I reviewed these signs with the patient. I recommended limiting WB to bathroom priveleges and meal table and to use any prescribed offloading devices in an effort to allow proper rest and healing of the wounded area. I explained that good wound care and compliance are necessary to allow healing and to prevent toe loss or limb loss. I'll see him next week. Once the once is healed and cellulitis completely resolved; I'm going to instruct him to see Dr. Jennifer Cunha more often to keep the callous from getting too thick and re-ulcerating.

## 2023-09-20 NOTE — LETTER
September 20, 2023     Annette Alvarez, [address]    Patient: Adelaida Ordaz   YOB: 1950   Date of Visit: 9/20/2023       Dear Dr. Lion Agarwal:    Thank you for referring Yadira Turk to me for evaluation. Below are my notes for this consultation. If you have questions, please do not hesitate to call me. I look forward to following your patient along with you. Sincerely,        Mari Castellanos DPM        CC: [facility]  9/20/2023  8:51 AM  Incomplete  This patient was seen on 9/20/23. My role is Foot , Ankle, and Wound Specialist    SUBJECTIVE    Chief Complaint:  Infection of foot    Patient ID: Adelaida Ordaz is a 72 y.o. male. Roberta Lam is known to me from past treatment of foot infections. He's been seeing dr. Melani López for callous care q 2 months. He developed a cellulitis of the foot (left) and went to the ER Monday and was given Keflex. He's been taking it and notes a reduction in tenderness and redness to the foot. He denies malaise, fevers today. The following portions of the patient's history were reviewed and updated as appropriate: allergies, current medications, past family history, past medical history, past social history, past surgical history and problem list.    Review of Systems   Constitutional: Negative. Respiratory: Negative. Skin: Positive for color change. OBJECTIVE      /77   Pulse 71   Ht 5' 11" (1.803 m)   Wt 90.6 kg (199 lb 12.8 oz)   BMI 27.87 kg/m²     Foot/Ankle Musculoskeletal Exam    General    Neurological: alert      Physical Exam  Vitals and nursing note reviewed. Constitutional:       General: He is not in acute distress. Appearance: Normal appearance. He is not ill-appearing, toxic-appearing or diaphoretic. Pulmonary:      Effort: Pulmonary effort is normal.   Skin:     Capillary Refill: Capillary refill takes less than 2 seconds.       Findings: Erythema present. Comments: I note a large callous medial Left hallux which is hemorrhagic. After debridement of the callous, I note a small 2mm ulcer which was likely the portal of entry for the infection. I note a fading erythema dorsal foot. No signs of abscess. Neurological:      General: No focal deficit present. Mental Status: He is alert and oriented to person, place, and time. ASSESSMENT    Diagnoses and all orders for this visit:    Ulcer of toe of left foot, limited to breakdown of skin ([address])        Problem List Items Addressed This Visit          Musculoskeletal and Integument    Ulcer of toe of left foot, limited to breakdown of skin ([address]) - Primary           PLAN        A formal timeout including patient identification, laterality and existing allergies using [facility] protocol was conducted. Procedure Performed: Debridement of subcutaneous tissue- >20 sq cm (66545). Under aseptic technique, the wound was thoroughly explored and bluntly probed for undermining, deep sinus tracts and bone involvement. Sterile instrumentation including scalpel, forceps and curette used to excise the clearly delineated devitalized subcutaneous tissue (fibrotic tissue and necrotic non-viable portions of fat) exposing viable tissue. After debridement, bleeding in the wound bed base was noted indicated viable subcutaneous tissue had been exposed. Bleeding controlled with gentle pressure. Patient tolerated debridement without complications. The wound was dressed with bacitracin and a DSD to be changed daily by patient. I instructed him to finish his entire keflex Rx. Wound dressing technique and frequency described to patient. I am to be called if any signs of infection develop such as erythema, increased wound size or significant change in appearance of wound, odor, pain, increased or change in color of drainage, swelling, fever, chills, nightsweats. I reviewed these signs with the patient.  I recommended limiting WB to bathroom priveleges and meal table and to use any prescribed offloading devices in an effort to allow proper rest and healing of the wounded area. I explained that good wound care and compliance are necessary to allow healing and to prevent toe loss or limb loss. I'll see him next week. Once the once is healed and cellulitis completely resolved; I'm going to instruct him to see Dr. Jennifer Cunha more often to keep the callous from getting too thick and re-ulcerating.

## 2023-09-22 LAB
BACTERIA BLD CULT: NORMAL
BACTERIA BLD CULT: NORMAL

## 2023-09-26 ENCOUNTER — OFFICE VISIT (OUTPATIENT)
Dept: PODIATRY | Facility: CLINIC | Age: 73
End: 2023-09-26
Payer: COMMERCIAL

## 2023-09-26 VITALS
SYSTOLIC BLOOD PRESSURE: 138 MMHG | HEART RATE: 76 BPM | BODY MASS INDEX: 27.97 KG/M2 | WEIGHT: 199.8 LBS | HEIGHT: 71 IN | DIASTOLIC BLOOD PRESSURE: 86 MMHG

## 2023-09-26 DIAGNOSIS — L97.521 ULCER OF TOE OF LEFT FOOT, LIMITED TO BREAKDOWN OF SKIN (HCC): Primary | ICD-10-CM

## 2023-09-26 PROCEDURE — 99213 OFFICE O/P EST LOW 20 MIN: CPT | Performed by: PODIATRIST

## 2023-09-26 NOTE — PROGRESS NOTES
This patient was seen on 9/26/23. My role is Foot , Ankle, and Wound Specialist    SUBJECTIVE    Chief Complaint:  Foot ulcer     Patient ID: Carly iFnk is a 72 y.o. male. Jing Hilton is known to me from past treatment of foot infections. He's been seeing dr. Funmilayo Emerson for callous care q 2 months. He developed a cellulitis of the foot (left) and went to the ER and was given Keflex. He's finished taking it and notes a reduction in tenderness and redness to the foot. He denies malaise, fevers today and notes all drainage has ceased. The following portions of the patient's history were reviewed and updated as appropriate: allergies, current medications, past family history, past medical history, past social history, past surgical history and problem list.    Review of Systems   Constitutional: Negative. Respiratory: Negative. Skin: Positive for color change. OBJECTIVE      /86   Pulse 76   Ht 5' 11" (1.803 m)   Wt 90.6 kg (199 lb 12.8 oz)   BMI 27.87 kg/m²     Foot/Ankle Musculoskeletal Exam    General    Neurological: alert       Physical Exam  Vitals and nursing note reviewed. Constitutional:       General: He is not in acute distress. Appearance: Normal appearance. He is not ill-appearing, toxic-appearing or diaphoretic. Pulmonary:      Effort: Pulmonary effort is normal.   Skin:     Capillary Refill: Capillary refill takes less than 2 seconds. Findings: Erythema present. Comments: I note a large callous medial Left hallux which is hemorrhagic. The previousl 2mm ulcer which was likely the portal of entry for the infection has healed. I note the erythema is gone. Neurological:      General: No focal deficit present. Mental Status: He is alert and oriented to person, place, and time.              ASSESSMENT     Diagnoses and all orders for this visit:    Ulcer of toe of left foot, limited to breakdown of skin ([address])         Problem List Items Addressed This Visit Musculoskeletal and Integument    Ulcer of toe of left foot, limited to breakdown of skin ([address]) - Primary           PLAN  No further dressings, antibiotics needed. He will follow-up with Dr. Anup Wilson for ongoing callous care.

## 2023-11-01 ENCOUNTER — VBI (OUTPATIENT)
Dept: ADMINISTRATIVE | Facility: OTHER | Age: 73
End: 2023-11-01

## 2023-12-04 ENCOUNTER — RA CDI HCC (OUTPATIENT)
Dept: OTHER | Facility: HOSPITAL | Age: 73
End: 2023-12-04

## 2023-12-04 NOTE — PROGRESS NOTES
[address] coding opportunities       Chart reviewed, no opportunity found: [address] Review     Patients Insurance     Medicare Insurance: [employer]

## 2023-12-08 ENCOUNTER — TELEPHONE (OUTPATIENT)
Dept: FAMILY MEDICINE CLINIC | Facility: CLINIC | Age: 73
End: 2023-12-08

## 2023-12-08 NOTE — TELEPHONE ENCOUNTER
Lmom/mailed postcard to reschedule cancelled appointment with Jayashree Pham or Dr. Aurora Vogt after 12/16/23.

## 2024-06-23 ENCOUNTER — APPOINTMENT (EMERGENCY)
Dept: RADIOLOGY | Facility: HOSPITAL | Age: 74
End: 2024-06-23
Payer: COMMERCIAL

## 2024-06-23 ENCOUNTER — HOSPITAL ENCOUNTER (EMERGENCY)
Facility: HOSPITAL | Age: 74
Discharge: HOME | End: 2024-06-23
Attending: EMERGENCY MEDICINE
Payer: COMMERCIAL

## 2024-06-23 VITALS
RESPIRATION RATE: 14 BRPM | HEART RATE: 66 BPM | SYSTOLIC BLOOD PRESSURE: 144 MMHG | BODY MASS INDEX: 28 KG/M2 | TEMPERATURE: 97.7 F | OXYGEN SATURATION: 96 % | HEIGHT: 71 IN | WEIGHT: 200 LBS | DIASTOLIC BLOOD PRESSURE: 90 MMHG

## 2024-06-23 DIAGNOSIS — R51.9 HEADACHE: ICD-10-CM

## 2024-06-23 DIAGNOSIS — S09.90XA INJURY OF HEAD, INITIAL ENCOUNTER: ICD-10-CM
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
## 2024-06-23 LAB
2HR DELTA HS TROPONIN: -1 NG/L
ALBUMIN SERPL BCG-MCNC: 4.5 G/DL (ref 3.5–5)
ALP SERPL-CCNC: 58 U/L (ref 34–104)
ALT SERPL W P-5'-P-CCNC: 33 U/L (ref 7–52)
ANION GAP SERPL CALCULATED.3IONS-SCNC: 7 MMOL/L (ref 4–13)
AST SERPL W P-5'-P-CCNC: 26 U/L (ref 13–39)
ATRIAL RATE: 84 BPM
BASOPHILS # BLD AUTO: 0.04 THOUSANDS/ÂΜL (ref 0–0.1)
BASOPHILS NFR BLD AUTO: 1 % (ref 0–1)
BILIRUB SERPL-MCNC: 1.02 MG/DL (ref 0.2–1)
BUN SERPL-MCNC: 23 MG/DL (ref 5–25)
CALCIUM SERPL-MCNC: 9.5 MG/DL (ref 8.4–10.2)
CARDIAC TROPONIN I PNL SERPL HS: 4 NG/L
CARDIAC TROPONIN I PNL SERPL HS: 5 NG/L
CHLORIDE SERPL-SCNC: 104 MMOL/L (ref 96–108)
CO2 SERPL-SCNC: 25 MMOL/L (ref 21–32)
CREAT SERPL-MCNC: 1.17 MG/DL (ref 0.6–1.3)
EOSINOPHIL # BLD AUTO: 0.01 THOUSAND/ÂΜL (ref 0–0.61)
EOSINOPHIL NFR BLD AUTO: 0 % (ref 0–6)
ERYTHROCYTE [DISTWIDTH] IN BLOOD BY AUTOMATED COUNT: 12.1 % (ref 11.6–15.1)
GFR SERPL CREATININE-BSD FRML MDRD: 62 ML/MIN/1.73SQ M
GLUCOSE SERPL-MCNC: 104 MG/DL (ref 65–140)
HCT VFR BLD AUTO: 47.4 % (ref 36.5–49.3)
HGB BLD-MCNC: 16.4 G/DL (ref 12–17)
IMM GRANULOCYTES # BLD AUTO: 0.01 THOUSAND/UL (ref 0–0.2)
IMM GRANULOCYTES NFR BLD AUTO: 0 % (ref 0–2)
LYMPHOCYTES # BLD AUTO: 1.22 THOUSANDS/ÂΜL (ref 0.6–4.47)
LYMPHOCYTES NFR BLD AUTO: 18 % (ref 14–44)
MCH RBC QN AUTO: 31.3 PG (ref 26.8–34.3)
MCHC RBC AUTO-ENTMCNC: 34.6 G/DL (ref 31.4–37.4)
MCV RBC AUTO: 91 FL (ref 82–98)
MONOCYTES # BLD AUTO: 0.49 THOUSAND/ÂΜL (ref 0.17–1.22)
MONOCYTES NFR BLD AUTO: 7 % (ref 4–12)
NEUTROPHILS # BLD AUTO: 5.02 THOUSANDS/ÂΜL (ref 1.85–7.62)
NEUTS SEG NFR BLD AUTO: 74 % (ref 43–75)
NRBC BLD AUTO-RTO: 0 /100 WBCS
P AXIS: 60 DEGREES
PLATELET # BLD AUTO: 184 THOUSANDS/UL (ref 149–390)
PMV BLD AUTO: 10.1 FL (ref 8.9–12.7)
POTASSIUM SERPL-SCNC: 4.2 MMOL/L (ref 3.5–5.3)
PR INTERVAL: 178 MS
PROT SERPL-MCNC: 7.4 G/DL (ref 6.4–8.4)
QRS AXIS: -87 DEGREES
QRSD INTERVAL: 78 MS
QT INTERVAL: 358 MS
QTC INTERVAL: 423 MS
RBC # BLD AUTO: 5.24 MILLION/UL (ref 3.88–5.62)
SODIUM SERPL-SCNC: 136 MMOL/L (ref 135–147)
T WAVE AXIS: 66 DEGREES
VENTRICULAR RATE: 84 BPM
WBC # BLD AUTO: 6.79 THOUSAND/UL (ref 4.31–10.16)

## 2024-06-23 PROCEDURE — 72125 CT NECK SPINE W/O DYE: CPT

## 2024-06-23 PROCEDURE — 12002 RPR S/N/AX/GEN/TRNK2.6-7.5CM: CPT | Performed by: EMERGENCY MEDICINE

## 2024-06-23 PROCEDURE — 70450 CT HEAD/BRAIN W/O DYE: CPT

## 2024-06-23 PROCEDURE — 99284 EMERGENCY DEPT VISIT MOD MDM: CPT | Performed by: EMERGENCY MEDICINE

## 2024-06-23 PROCEDURE — 93005 ELECTROCARDIOGRAM TRACING: CPT

## 2024-06-23 PROCEDURE — 90471 IMMUNIZATION ADMIN: CPT

## 2024-06-23 PROCEDURE — 84484 ASSAY OF TROPONIN QUANT: CPT | Performed by: EMERGENCY MEDICINE

## 2024-06-23 PROCEDURE — 93010 ELECTROCARDIOGRAM REPORT: CPT | Performed by: INTERNAL MEDICINE

## 2024-06-23 PROCEDURE — 99285 EMERGENCY DEPT VISIT HI MDM: CPT

## 2024-06-23 PROCEDURE — 36415 COLL VENOUS BLD VENIPUNCTURE: CPT

## 2024-06-23 PROCEDURE — 90715 TDAP VACCINE 7 YRS/> IM: CPT

## 2024-06-23 PROCEDURE — 71045 X-RAY EXAM CHEST 1 VIEW: CPT

## 2024-06-23 PROCEDURE — 85025 COMPLETE CBC W/AUTO DIFF WBC: CPT | Performed by: EMERGENCY MEDICINE

## 2024-06-23 PROCEDURE — 80053 COMPREHEN METABOLIC PANEL: CPT | Performed by: EMERGENCY MEDICINE

## 2024-06-23 RX ORDER — LIDOCAINE HYDROCHLORIDE AND EPINEPHRINE 10; 10 MG/ML; UG/ML
10 INJECTION, SOLUTION INFILTRATION; PERINEURAL ONCE
Status: COMPLETED | OUTPATIENT
Start: 2024-06-23 | End: 2024-06-23

## 2024-06-23 RX ORDER — ACETAMINOPHEN 325 MG/1
975 TABLET ORAL ONCE
Status: COMPLETED | OUTPATIENT
Start: 2024-06-23 | End: 2024-06-23

## 2024-06-23 RX ADMIN — ACETAMINOPHEN 975 MG: 325 TABLET, FILM COATED ORAL at 13:11

## 2024-06-23 RX ADMIN — TETANUS TOXOID, REDUCED DIPHTHERIA TOXOID AND ACELLULAR PERTUSSIS VACCINE, ADSORBED 0.5 ML: 5; 2.5; 8; 8; 2.5 SUSPENSION INTRAMUSCULAR at 13:00

## 2024-06-23 RX ADMIN — LIDOCAINE HYDROCHLORIDE,EPINEPHRINE BITARTRATE 10 ML: 10; .01 INJECTION, SOLUTION INFILTRATION; PERINEURAL at 13:00

## 2024-06-23 NOTE — ED PROVIDER NOTES
"History  Chief Complaint   Patient presents with    Fall     Patient reports he fell last night when knee gave out. +HS, - thinners, +LOC. Patient seen by [facility] nurse today who advised further eval in the ED for wound on left forehead.     HPI  Patient is a 73 y.o. male with history of HTN presenting to the emergency department for fall, head laceration. Patient states that he was sitting on a bench last night, he stood up and felt lightheaded, and then fell forward and hit his head on the pavement.  Patient states that he thinks he passed out \"for a few seconds\".  Since then patient complains of having a headache which is worse with light and loud sounds.  He denies having any neck pain, chest pain, shortness of breath, vision changes, focal weakness.  Patient denies taking any anticoagulation or antiplatelet medications    Past Medical History:   Diagnosis Date    Arthritis     Heart murmur     No pluerisy    Hypertension     Migraine        History reviewed. No pertinent surgical history.    Family History   Problem Relation Age of Onset    Cancer Mother     Migraines Father     Bipolar disorder Brother     Migraines Maternal Grandmother      I have reviewed and agree with the history as documented.    E-Cigarette/Vaping    E-Cigarette Use Never User      E-Cigarette/Vaping Substances     Social History     Tobacco Use    Smoking status: Former     Current packs/day: 0.00     Types: Cigarettes     Quit date:      Years since quittin.4    Smokeless tobacco: Never   Vaping Use    Vaping status: Never Used   Substance Use Topics    Alcohol use: Not Currently     Comment: Whiskey and beer    Drug use: Yes     Types: Marijuana     Comment: medical marijuana        Review of Systems   Constitutional:  Negative for fever.   HENT:  Negative for congestion.    Eyes:  Negative for pain.   Respiratory:  Negative for cough.    Cardiovascular:  Negative for chest pain.   Gastrointestinal:  Negative for diarrhea " and vomiting.   Genitourinary:  Negative for dysuria.   Musculoskeletal:  Negative for back pain.   Skin:  Negative for rash.   Neurological:  Positive for headaches. Negative for dizziness.   All other systems reviewed and are negative.      Physical Exam  ED Triage Vitals [06/23/24 1128]   Temperature Pulse Respirations Blood Pressure SpO2   97.7 °F (36.5 °C) 77 16 160/92 97 %      Temp Source Heart Rate Source Patient Position - Orthostatic VS BP Location FiO2 (%)   Oral Monitor Sitting Left arm --      Pain Score       2             Orthostatic Vital Signs  Vitals:    06/23/24 1300 06/23/24 1400 06/23/24 1500 06/23/24 1530   BP: 143/98 143/76 151/91 144/90   Pulse: 80 70 70 66   Patient Position - Orthostatic VS: Sitting Sitting Sitting Sitting       Physical Exam  Vitals and nursing note reviewed.   Constitutional:       General: He is not in acute distress.     Appearance: Normal appearance.   HENT:      Head:      Comments: 3 cm laceration above left eyebrow     Mouth/Throat:      Mouth: Mucous membranes are moist.   Eyes:      Conjunctiva/sclera: Conjunctivae normal.   Cardiovascular:      Rate and Rhythm: Normal rate and regular rhythm.      Pulses: Normal pulses.      Heart sounds: Normal heart sounds.   Pulmonary:      Effort: Pulmonary effort is normal.      Breath sounds: Normal breath sounds.   Abdominal:      Palpations: Abdomen is soft.      Tenderness: There is no abdominal tenderness.   Musculoskeletal:         General: No tenderness.      Cervical back: Neck supple.   Skin:     General: Skin is warm and dry.      Capillary Refill: Capillary refill takes less than 2 seconds.   Neurological:      General: No focal deficit present.      Mental Status: He is alert and oriented to person, place, and time. Mental status is at baseline.      Cranial Nerves: No cranial nerve deficit.      Sensory: No sensory deficit.      Motor: No weakness.      Coordination: Coordination normal.      Gait: Gait normal.    Psychiatric:         Mood and Affect: Mood normal.         ED Medications  Medications   tetanus-diphtheria-acellular pertussis (BOOSTRIX) IM injection 0.5 mL (0.5 mL Intramuscular Given 6/23/24 1300)   lidocaine-epinephrine (XYLOCAINE/EPINEPHRINE) 1 %-1:100,000 injection 10 mL (10 mL Infiltration Given by Other 6/23/24 1300)   acetaminophen (TYLENOL) tablet 975 mg (975 mg Oral Given 6/23/24 1311)       Diagnostic Studies  Results Reviewed       Procedure Component Value Units Date/Time    HS Troponin I 2hr [[id number]]  (Normal) Collected: 06/23/24 1501    Lab Status: Final result Specimen: Blood from Arm, Left Updated: 06/23/24 1540     hs TnI 2hr 4 ng/L      Delta 2hr hsTnI -1 ng/L     HS Troponin 0hr (reflex protocol) [[id number]]  (Normal) Collected: 06/23/24 1232    Lab Status: Final result Specimen: Blood from Arm, Left Updated: 06/23/24 1321     hs TnI 0hr 5 ng/L     Comprehensive metabolic panel [[id number]]  (Abnormal) Collected: 06/23/24 1232    Lab Status: Final result Specimen: Blood from Arm, Left Updated: 06/23/24 1316     Sodium 136 mmol/L      Potassium 4.2 mmol/L      Chloride 104 mmol/L      CO2 25 mmol/L      ANION GAP 7 mmol/L      BUN 23 mg/dL      Creatinine 1.17 mg/dL      Glucose 104 mg/dL      Calcium 9.5 mg/dL      AST 26 U/L      ALT 33 U/L      Alkaline Phosphatase 58 U/L      Total Protein 7.4 g/dL      Albumin 4.5 g/dL      Total Bilirubin 1.02 mg/dL      eGFR 62 ml/min/1.73sq m     Narrative:      National Kid[employer] Foundation guidelines for Chronic Kidney Disease (CKD):     Stage 1 with normal or high GFR (GFR > 90 mL/min/1.73 square meters)    Stage 2 Mild CKD (GFR = 60-89 mL/min/1.73 square meters)    Stage 3A Moderate CKD (GFR = 45-59 mL/min/1.73 square meters)    Stage 3B Moderate CKD (GFR = 30-44 mL/min/1.73 square meters)    Stage 4 Severe CKD (GFR = 15-29 mL/min/1.73 square meters)    Stage 5 End Stage CKD (GFR <15 mL/min/1.73 square meters)  Note: GFR calculation is  accurate only with a steady state creatinine    CBC and differential [[id number]] Collected: 06/23/24 1232    Lab Status: Final result Specimen: Blood from Arm, Left Updated: 06/23/24 1256     WBC 6.79 Thousand/uL      RBC 5.24 Million/uL      Hemoglobin 16.4 g/dL      Hematocrit 47.4 %      MCV 91 fL      MCH 31.3 pg      MCHC 34.6 g/dL      RDW 12.1 %      MPV 10.1 fL      Platelets 184 Thousands/uL      nRBC 0 /100 WBCs      Segmented % 74 %      Immature Grans % 0 %      Lymphocytes % 18 %      Monocytes % 7 %      Eosinophils Relative 0 %      Basophils Relative 1 %      Absolute Neutrophils 5.02 Thousands/µL      Absolute Immature Grans 0.01 Thousand/uL      Absolute Lymphocytes 1.22 Thousands/µL      Absolute Monocytes 0.49 Thousand/µL      Eosinophils Absolute 0.01 Thousand/µL      Basophils Absolute 0.04 Thousands/µL                    CT head wo contrast   Final Result by Nate Hager MD (06/23 1423)      1.  Anterior left-sided superficial soft tissue injury without a calvarial fracture or acute intracranial injury.   2.  Mucosal sinus disease of the right maxillary sinus.               Workstation performed: [id number]         CT spine cervical without contrast   Final Result by Nate Hager MD (06/23 1427)      No cervical spine fracture or traumatic malalignment.                  Workstation performed: [id number]         XR chest 1 view portable   ED Interpretation by Smiley Monreal DO (06/23 1402)   No acute cardiopulmonary abnormality      Final Result by Paty Rivera MD (06/23 1702)      No acute cardiopulmonary disease.      No acute displaced fracture.      Workstation performed: [id number]               Procedures  Universal Protocol:  Consent: Verbal consent obtained.  Risks and benefits: risks, benefits and alternatives were discussed  Consent given by: patient  Patient identity confirmed: verbally with patient  Laceration repair    Date/Time: 6/23/2024 1:36 PM    Performed by:  "Smiley Monreal DO  Authorized by: Smiley Monreal DO  Body area: head/neck  Location details: forehead  Laceration length: 3 cm  Foreign bodies: no foreign bodies  Tendon involvement: none  Nerve involvement: none  Anesthesia: local infiltration    Anesthesia:  Local Anesthetic: lidocaine 1% with epinephrine  Anesthetic total: 3 mL    Sedation:  Patient sedated: no      Wound Dehiscence:  Superficial Wound Dehiscence: simple closure      Procedure Details:  Irrigation solution: saline  Irrigation method: syringe  Amount of cleaning: extensive  Debridement: none  Degree of undermining: none  Skin closure: 6-0 Prolene  Number of sutures: 4            ED Course  ED Course as of 06/25/24 0745   Sun Jun 23, 2024   1300 CBC and differential  No acute abnormality   1301 Procedure Note: EKG  Date/Time: 06/23/24 1:01 PM   Interpreted by: Smiley Monreal  Indications / Diagnosis: fall / syncope  ECG reviewed by me, the ED Provider: yes   The EKG demonstrates:  Rate: 84  Rhythm: NSR  Intervals: regular  Axis: left  QRS/Blocks: regular  ST Changes: Nonspecific ST changes.  Compared to prior EKG on 8/30/2018, nonspecific changes.      1333 hs TnI 0hr: 5  Will check 2-hour delta troponin   1334 Comprehensive metabolic panel(!)  No acute abnormality   1459 No acute abnormality on CT head or cervical spine   1543 Delta 2hr hsTnI: -1         CRAFFT      Flowsheet Row Most Recent Value   CRAFFT Initial Screen: During the past 12 months, did you:    1. Drink any alcohol (more than a few sips)?  No Filed at: 06/23/2024 1237   2. Smoke any marijuana or hashish No Filed at: 06/23/2024 1237   3. Use anything else to get high? (\"anything else\" includes illegal drugs, over the counter and prescription drugs, and things that you sniff or 'ortega')? No Filed at: 06/23/2024 1237                            SBIRT 22yo+      Flowsheet Row Most Recent Value   Initial Alcohol Screen: US AUDIT-C     1. How often do you have a drink containing " alcohol? 0 Filed at: 06/23/2024 1237   2. How many drinks containing alcohol do you have on a typical day you are drinking?  0 Filed at: 06/23/2024 1237   3a. Male UNDER 65: How often do you have five or more drinks on one occasion? 0 Filed at: 06/23/2024 1237   3b. FEMALE Any Age, or MALE 65+: How often do you have 4 or more drinks on one occassion? 0 Filed at: 06/23/2024 1237   Audit-C Score 0 Filed at: 06/23/2024 1237   LUIS: How many times in the past year have you...    Used an illegal drug or used a prescription medication for non-medical reasons? Never Filed at: 06/23/2024 1237                  Medical Decision Making  Amount and/or Complexity of Data Reviewed  Labs: ordered. Decision-making details documented in ED Course.  Radiology: ordered and independent interpretation performed.    Risk  OTC drugs.  Prescription drug management.    Patient is a 73 y.o. male with PMH of hypertension who presents to the ED with fall, head injury.    Vital signs stable. On exam centimeter laceration left forehead.    History and physical exam most consistent with vasovagal syncope. However, differential diagnosis included but not limited to ACS, intracranial abnormality, electrolyte abnormality, anemia.     Plan: Basic labs, troponin, CT head    View ED course above for further discussion on patient workup.    All labs reviewed and utilized in the medical decision making process  All radiology studies independently viewed by me and interpreted by the radiologist.  I reviewed all testing with the patient.     Upon re-evaluation patient resting comfortably, bleeding resolved.    Disposition: I have reviewed the patient's vital signs, nursing notes, and other relevant tests/information. I had a detailed discussion with the patient regarding the history, exam findings, and any diagnostic results.   Plan to discharge home in stable condition, follow up with primary care provider or urgent care in 5 days for suture  "removal.  Discussed with patient who is agreeable to plan.  I discussed discharge instructions, need for follow-up, and oral return precautions for what to return for in addition to the written return precautions and discharge instructions, specifically highlighting areas of special concern.  The patient verbalized understanding of the discharge instructions and warnings that would necessitate return to the Emergency Department including worsening pain.  All questions the patient had were answered prior to discharge to the best of my ability.       Portions of the record may have been created with voice recognition software. Occasional wrong word or \"sound a like\" substitutions may have occurred due to the inherent limitations of voice recognition software. Read the chart carefully and recognize, using context, where substitutions have occurred.        Disposition  Final diagnoses:   Fall, initial encounter   Injury of head, initial encounter   Headache     Time reflects when diagnosis was documented in both MDM as applicable and the Disposition within this note       Time User Action Codes Description Comment    6/23/2024  3:43 PM Smiley Monreal [W19.XXXA] Fall, initial encounter     6/23/2024  3:43 PM Smiley Monreal [S09.90XA] Injury of head, initial encounter     6/23/2024  3:45 PM Smiley Monreal [R51.9] Headache           ED Disposition       ED Disposition   Discharge    Condition   Stable    Date/Time   Sun Jun 23, 2024 1543    Comment   Krzysztof Hylton discharge to home/self care.                   Follow-up Information       Follow up With Specialties Details Why Contact Info    Kay Del Real MD Family Medicine Schedule an appointment as soon as possible for a visit in 5 days  [address] [facility] [place] [address]  [phone]              Discharge Medication List as of 6/23/2024  3:45 PM        CONTINUE these medications which have NOT CHANGED    Details   albuterol (VENTOLIN HFA) 90 " mcg/act inhaler Inhale 2 puffs every 6 (six) hours as needed for wheezing, Starting Sat 4/27/2019, Normal      betamethasone, augmented, (DIPROLENE) 0.05 % ointment Apply topically 2 (two) times a day, Starting Fri 1/27/2023, Normal      cholecalciferol (VITAMIN D3) 400 units tablet Take 400 Units by mouth daily, Historical Med      Galcanezumab-gnlm 120 MG/ML SOAJ Inject 1 ml in each thigh and or stomach for a total of two injections the first time. Then one injection only every thirty days afterward, Normal      ibuprofen (MOTRIN) 600 mg tablet Take 600 mg by mouth every 6 (six) hours as needed for mild pain, Historical Med      Magnesium 400 MG TABS Take 1 tablet (400 mg total) by mouth daily, Starting Tue 7/24/2018, No Print      SUMAtriptan (IMITREX) 50 mg tablet Take 1 tab at migraine onset, max 2 tabs in 24 hours. Max 2 days a week, Normal           No discharge procedures on file.    PDMP Review       None             ED Provider  Attending physically available and evaluated Krzysztof ADDISON Warner. I managed the patient along with the ED Attending.    Electronically Signed by           Smiley Monreal DO  06/25/24 3520

## 2024-06-23 NOTE — ED NOTES
Please call friend Rissa Massimo for discharge transport: [phone]. She requested to ask patient to wait inside     Aileen Ojeda RN  06/23/24 4200

## 2024-06-23 NOTE — ED ATTENDING ATTESTATION
I saw and evaluated the patient. I have discussed the patient with the resident physician and agree with the resident's findings, assessment and plan as documented in the resident physician's note, unless otherwise documented below. All available laboratory and imaging studies were reviewed by myself.  I was present for key portions of any procedure(s) performed by the resident and I was immediately available to provide assistance.     I agree with the current assessment done in the Emergency Department. I have conducted an independent evaluation of this patient    Final Diagnosis:  1. Fall, initial encounter    2. Injury of head, initial encounter    3. Headache             Chief Complaint   Patient presents with    Fall     Patient reports he fell last night when knee gave out. +HS, - thinners, +LOC. Patient seen by [facility] nurse today who advised further eval in the ED for wound on left forehead.     This is a 73 y.o. male presenting for evaluation of syncope/fall. Last night patient felt lightheaded, fell and lost consciousness. Believes LOC with a few seconds. Feels fine now. Does have laceration to left eyebrow. No anticoagulant or antiplatelet agent use. Denies headache, neck pain, back pain, chest pain, abdominal pain, extremity pain, focal neurologic symptoms, wounds, or any other injuries or complaints.      PMH:   has a past medical history of Arthritis, Heart murmur (), Hypertension, and Migraine.    PSH:   has no past surgical history on file.    Social:  Social History     Substance and Sexual Activity   Alcohol Use Not Currently    Comment: Whiskey and beer     Social History     Tobacco Use   Smoking Status Former    Current packs/day: 0.00    Types: Cigarettes    Quit date:     Years since quittin.4   Smokeless Tobacco Never     Social History     Substance and Sexual Activity   Drug Use Yes    Types: Marijuana    Comment: medical marijuana     PE:  Vitals:    24 1300 24 1400  06/23/24 1500 06/23/24 1530   BP: 143/98 143/76 151/91 144/90   BP Location: Right arm Right arm Right arm Right arm   Pulse: 80 70 70 66   Resp: 18 18 18 14   Temp:       TempSrc:       SpO2: 94% 94% 98% 96%   Weight:       Height:             Physical exam:  GENERAL APPEARANCE: Resting comfortably, no distress, non-toxic  NEURO: Alert, no focal deficits   HEENT:  +3 cm laceration to left eyebrow. No other craniofacial ecchymosis, crepitus, or deformity. No cedillo's sign. No raccoon eyes. No hemotympanum.   Neck:No midline cervical spine tenderness. Full active range of motion.  CV: RRR, no murmurs, rubs, or gallops  LUNGS: CTAB, no wheezing, rales, or rhonchi. No retractions. No tachypnea.   CHEST: No tenderness or crepitus.   GI: Abdomen soft, non-tender, no rebound or guarding   MSK: Extremities non-tender, no swelling or deformities. No thoracolumbar tenderness, stepoffs, or deformities.  No CVA tenderness.    SKIN: Warm and dry, no rashes, capillary refill < 2 seconds        Assessment and plan: This is a 73 y.o. male presenting for evaluation of syncope/fall. Within ddx consider orthostatic vs vasovagal vs cardiogenic event, acute ICH, contusion, laceration. Will obtain head and C-spine CT, cardiac panel to evaluate. Resident will clean and repair wound. Unknown last tetanus so will update.     Final assessment: Workup reassuring. Wound repaired. Strict ED return precautions provided should symptoms worsen and patient can otherwise follow up outpatient. Patient expresses an understanding and agreement with the plan and remains in good condition for discharge.       Code Status: No Order  Advance Directive and Living Will:      Power of :    POLST:      Medications   tetanus-diphtheria-acellular pertussis (BOOSTRIX) IM injection 0.5 mL (0.5 mL Intramuscular Given 6/23/24 1300)   lidocaine-epinephrine (XYLOCAINE/EPINEPHRINE) 1 %-1:100,000 injection 10 mL (10 mL Infiltration Given by Other 6/23/24 1300)    acetaminophen (TYLENOL) tablet 975 mg (975 mg Oral Given 6/23/24 1311)     CT head wo contrast   Final Result      1.  Anterior left-sided superficial soft tissue injury without a calvarial fracture or acute intracranial injury.   2.  Mucosal sinus disease of the right maxillary sinus.               Workstation performed: [id number]         CT spine cervical without contrast   Final Result      No cervical spine fracture or traumatic malalignment.                  Workstation performed: [id number]         XR chest 1 view portable   ED Interpretation   No acute cardiopulmonary abnormality      Final Result      No acute cardiopulmonary disease.      No acute displaced fracture.      Workstation performed: [id number]           Orders Placed This Encounter   Procedures    Laceration repair    XR chest 1 view portable    CT head wo contrast    CT spine cervical without contrast    CBC and differential    Comprehensive metabolic panel    HS Troponin 0hr (reflex protocol)    HS Troponin I 2hr    Continuous cardiac monitoring    Continuous pulse oximetry    ECG 12 lead    ECG 12 lead     Labs Reviewed   COMPREHENSIVE METABOLIC PANEL - Abnormal       Result Value Ref Range Status    Sodium 136  135 - 147 mmol/L Final    Potassium 4.2  3.5 - 5.3 mmol/L Final    Chloride 104  96 - 108 mmol/L Final    CO2 25  21 - 32 mmol/L Final    ANION GAP 7  4 - 13 mmol/L Final    BUN 23  5 - 25 mg/dL Final    Creatinine 1.17  0.60 - 1.30 mg/dL Final    Comment: Standardized to IDMS reference method    Glucose 104  65 - 140 mg/dL Final    Comment: If the patient is fasting, the ADA then defines impaired fasting glucose as > 100 mg/dL and diabetes as > or equal to 123 mg/dL.    Calcium 9.5  8.4 - 10.2 mg/dL Final    AST 26  13 - 39 U/L Final    ALT 33  7 - 52 U/L Final    Comment: Specimen collection should occur prior to Sulfasalazine administration due to the potential for falsely depressed results.     Alkaline Phosphatase 58  34 - 104  "U/L Final    Total Protein 7.4  6.4 - 8.4 g/dL Final    Albumin 4.5  3.5 - 5.0 g/dL Final    Total Bilirubin 1.02 (*) 0.20 - 1.00 mg/dL Final    Comment: Use of this assay is not recommended for patients undergoing treatment with eltrombopag due to the potential for falsely elevated results.  N-acetyl-p-benzoquinone imine (metabolite of Acetaminophen) will generate erroneously low results in samples for patients that have taken an overdose of Acetaminophen.    eGFR 62  ml/min/1.73sq m Final    Narrative:     National Kidney [employer] Foundation guidelines for Chronic Kidney Disease (CKD):     Stage 1 with normal or high GFR (GFR > 90 mL/min/1.73 square meters)    Stage 2 Mild CKD (GFR = 60-89 mL/min/1.73 square meters)    Stage 3A Moderate CKD (GFR = 45-59 mL/min/1.73 square meters)    Stage 3B Moderate CKD (GFR = 30-44 mL/min/1.73 square meters)    Stage 4 Severe CKD (GFR = 15-29 mL/min/1.73 square meters)    Stage 5 End Stage CKD (GFR <15 mL/min/1.73 square meters)  Note: GFR calculation is accurate only with a steady state creatinine   HS TROPONIN I 0HR - Normal    hs TnI 0hr 5  \"Refer to ACS Flowchart\"- see link ng/L Final    Comment:                                              Initial (time 0) result  If >=50 ng/L, Myocardial injury suggested ;  Type of myocardial injury and treatment strategy  to be determined.  If 5-49 ng/L, a delta result at 2 hours and or 4 hours will be needed to further evaluate.  If <4 ng/L, and chest pain has been >3 hours since onset, patient may qualify for discharge based on the HEART score in the ED.  If <5 ng/L and <3hours since onset of chest pain, a delta result at 2 hours will be needed to further evaluate.    HS Troponin 99th Percentile URL of a Health Population=12 ng/L with a 95% Confidence Interval of 8-18 ng/L.    Second Troponin (time 2 hours)  If calculated delta >= 20 ng/L,  Myocardial injury suggested ; Type of myocardial injury and treatment strategy to be " "determined.  If 5-49 ng/L and the calculated delta is 5-19 ng/L, consult medical service for evaluation.  Continue evaluation for ischemia on ecg and other possible etiology and repeat hs troponin at 4 hours.  If delta is <5 ng/L at 2 hours, consider discharge based on risk stratification via the HEART score (if in ED), or REMEDIOS risk score in IP/Observation.    HS Troponin 99th Percentile URL of a Health Population=12 ng/L with a 95% Confidence Interval of 8-18 ng/L.   HS TROPONIN I 2HR - Normal    hs TnI 2hr 4  \"Refer to ACS Flowchart\"- see link ng/L Final    Comment:                                              Initial (time 0) result  If >=50 ng/L, Myocardial injury suggested ;  Type of myocardial injury and treatment strategy  to be determined.  If 5-49 ng/L, a delta result at 2 hours and or 4 hours will be needed to further evaluate.  If <4 ng/L, and chest pain has been >3 hours since onset, patient may qualify for discharge based on the HEART score in the ED.  If <5 ng/L and <3hours since onset of chest pain, a delta result at 2 hours will be needed to further evaluate.    HS Troponin 99th Percentile URL of a Health Population=12 ng/L with a 95% Confidence Interval of 8-18 ng/L.    Second Troponin (time 2 hours)  If calculated delta >= 20 ng/L,  Myocardial injury suggested ; Type of myocardial injury and treatment strategy to be determined.  If 5-49 ng/L and the calculated delta is 5-19 ng/L, consult medical service for evaluation.  Continue evaluation for ischemia on ecg and other possible etiology and repeat hs troponin at 4 hours.  If delta is <5 ng/L at 2 hours, consider discharge based on risk stratification via the HEART score (if in ED), or REMEDIOS risk score in IP/Observation.    HS Troponin 99th Percentile URL of a Health Population=12 ng/L with a 95% Confidence Interval of 8-18 ng/L.    Delta 2hr hsTnI -1  <20 ng/L Final   CBC AND DIFFERENTIAL    WBC 6.79  4.31 - 10.16 Thousand/uL Final    RBC 5.24  3.88 " - 5.62 Million/uL Final    Hemoglobin 16.4  12.0 - 17.0 g/dL Final    Hematocrit 47.4  36.5 - 49.3 % Final    MCV 91  82 - 98 fL Final    MCH 31.3  26.8 - 34.3 pg Final    MCHC 34.6  31.4 - 37.4 g/dL Final    RDW 12.1  11.6 - 15.1 % Final    MPV 10.1  8.9 - 12.7 fL Final    Platelets 184  149 - 390 Thousands/uL Final    nRBC 0  /100 WBCs Final    Segmented % 74  43 - 75 % Final    Immature Grans % 0  0 - 2 % Final    Lymphocytes % 18  14 - 44 % Final    Monocytes % 7  4 - 12 % Final    Eosinophils Relative 0  0 - 6 % Final    Basophils Relative 1  0 - 1 % Final    Absolute Neutrophils 5.02  1.85 - 7.62 Thousands/µL Final    Absolute Immature Grans 0.01  0.00 - 0.20 Thousand/uL Final    Absolute Lymphocytes 1.22  0.60 - 4.47 Thousands/µL Final    Absolute Monocytes 0.49  0.17 - 1.22 Thousand/µL Final    Eosinophils Absolute 0.01  0.00 - 0.61 Thousand/µL Final    Basophils Absolute 0.04  0.00 - 0.10 Thousands/µL Final         Time reflects when diagnosis was documented in both MDM as applicable and the Disposition within this note       Time User Action Codes Description Comment    6/23/2024  3:43 PM Smiley Monreal [W19.XXXA] Fall, initial encounter     6/23/2024  3:43 PM Smiley Monreal [S09.90XA] Injury of head, initial encounter     6/23/2024  3:45 PM Smiley Monreal [R51.9] Headache           ED Disposition       ED Disposition   Discharge    Condition   Stable    Date/Time   Sun Jun 23, 2024  3:43 PM    Comment   Krzysztof Hylton discharge to home/self care.                   Follow-up Information       Follow up With Specialties Details Why Contact Info    Kay Del Real MD Family Medicine Schedule an appointment as soon as possible for a visit in 5 days  [address] [place] [id number]  [phone]            Discharge Medication List as of 6/23/2024  3:45 PM        CONTINUE these medications which have NOT CHANGED    Details   albuterol (VENTOLIN HFA) 90 mcg/act inhaler Inhale 2 puffs every  6 (six) hours as needed for wheezing, Starting Sat 4/27/2019, Normal      betamethasone, augmented, (DIPROLENE) 0.05 % ointment Apply topically 2 (two) times a day, Starting Fri 1/27/2023, Normal      cholecalciferol (VITAMIN D3) 400 units tablet Take 400 Units by mouth daily, Historical Med      Galcanezumab-gnlm 120 MG/ML SOAJ Inject 1 ml in each thigh and or stomach for a total of two injections the first time. Then one injection only every thirty days afterward, Normal      ibuprofen (MOTRIN) 600 mg tablet Take 600 mg by mouth every 6 (six) hours as needed for mild pain, Historical Med      Magnesium 400 MG TABS Take 1 tablet (400 mg total) by mouth daily, Starting Tue 7/24/2018, No Print      SUMAtriptan (IMITREX) 50 mg tablet Take 1 tab at migraine onset, max 2 tabs in 24 hours. Max 2 days a week, Normal           No discharge procedures on file.  Prior to Admission Medications   Prescriptions Last Dose Informant Patient Reported? Taking?   Galcanezumab-gnlm 120 MG/ML SOAJ   No No   Sig: Inject 1 ml in each thigh and or stomach for a total of two injections the first time. Then one injection only every thirty days afterward   Patient not taking: Reported on 6/15/2022   Magnesium 400 MG TABS  Self No No   Sig: Take 1 tablet (400 mg total) by mouth daily   SUMAtriptan (IMITREX) 50 mg tablet   No No   Sig: Take 1 tab at migraine onset, max 2 tabs in 24 hours. Max 2 days a week   Patient not taking: Reported on 1/27/2023   albuterol (VENTOLIN HFA) 90 mcg/act inhaler  Self No No   Sig: Inhale 2 puffs every 6 (six) hours as needed for wheezing   Patient not taking: Reported on 11/28/2022   betamethasone, augmented, (DIPROLENE) 0.05 % ointment   No No   Sig: Apply topically 2 (two) times a day   Patient not taking: Reported on 5/11/2023   cholecalciferol (VITAMIN D3) 400 units tablet   Yes No   Sig: Take 400 Units by mouth daily   ibuprofen (MOTRIN) 600 mg tablet   Yes No   Sig: Take 600 mg by mouth every 6 (six)  "hours as needed for mild pain      Facility-Administered Medications: None         Portions of the record may have been created with voice recognition software. Occasional wrong word or \"sound a like\" substitutions may have occurred due to the inherent limitations of voice recognition software. Read the chart carefully and recognize, using context, where substitutions have occurred.    Electronically signed by:  Luz Maria Palacios    "

## 2024-06-23 NOTE — DISCHARGE INSTRUCTIONS
You were seen in the emergency department today for fall / head injury.    Your testing showed:  1.  Anterior left-sided superficial soft tissue injury without a calvarial fracture or acute intracranial injury.  2.  Mucosal sinus disease of the right maxillary sinus.    Follow up with your primary care provider / urgent care in 5 days to have your sutures removed.     Take Tylenol / Ibuprofen as needed following the instructions on the bottle as needed for headache.     Return to the emergency department for any new or concerning symptoms including focal weakness, worsening headache.     Thank you for choosing  [facility] for your care today.

## 2024-06-28 ENCOUNTER — OFFICE VISIT (OUTPATIENT)
Dept: FAMILY MEDICINE CLINIC | Facility: CLINIC | Age: 74
End: 2024-06-28
Payer: COMMERCIAL

## 2024-06-28 VITALS
DIASTOLIC BLOOD PRESSURE: 76 MMHG | HEIGHT: 71 IN | OXYGEN SATURATION: 95 % | HEART RATE: 80 BPM | BODY MASS INDEX: 25.2 KG/M2 | WEIGHT: 180 LBS | RESPIRATION RATE: 16 BRPM | TEMPERATURE: 97.3 F | SYSTOLIC BLOOD PRESSURE: 110 MMHG

## 2024-06-28 DIAGNOSIS — S01.112D LACERATION OF LEFT EYEBROW, SUBSEQUENT ENCOUNTER: Primary | ICD-10-CM

## 2024-06-28 PROBLEM — S01.112A LACERATION OF LEFT EYEBROW: Status: ACTIVE | Noted: 2024-06-28

## 2024-06-28 PROCEDURE — G2211 COMPLEX E/M VISIT ADD ON: HCPCS | Performed by: NURSE PRACTITIONER

## 2024-06-28 PROCEDURE — 99214 OFFICE O/P EST MOD 30 MIN: CPT | Performed by: NURSE PRACTITIONER

## 2024-06-28 NOTE — ASSESSMENT & PLAN NOTE
Patient was seen in the emergency room on June 23 after sustaining a fall.  At that time the patient was outside and fell hitting the left side of his head, arm and bilateral knees.  EKG in the emergency room was performed as well as some blood work which were normal.  There were no new changes on his EKG.  He did sustain a laceration above his left eyebrow and this was closed with 4 sutures.  He comes to the office today for suture removal.  4 sutures were removed without difficulty in the office today.  He does have residual scabbing in that area.  I did recommend that he wash with soap and water and pat dry.  This area does not look infected.  He still continues with occasional dizziness however this may be dehydration or heat related.  He will call next week if the dizziness persists

## 2024-06-28 NOTE — PROGRESS NOTES
Ambulatory Visit  Name: Krzysztof Hylton      : 1950      MRN: [id number]  Encounter Provider: FREIDA Carlin  Encounter Date: 2024   Encounter department: [facility]    Assessment & Plan   1. Laceration of left eyebrow, subsequent encounter  Assessment & Plan:  Patient was seen in the emergency room on  after sustaining a fall.  At that time the patient was outside and fell hitting the left side of his head, arm and bilateral knees.  EKG in the emergency room was performed as well as some blood work which were normal.  There were no new changes on his EKG.  He did sustain a laceration above his left eyebrow and this was closed with 4 sutures.  He comes to the office today for suture removal.  4 sutures were removed without difficulty in the office today.  He does have residual scabbing in that area.  I did recommend that he wash with soap and water and pat dry.  This area does not look infected.  He still continues with occasional dizziness however this may be dehydration or heat related.  He will call next week if the dizziness persists  Orders:  -     Suture removal      Depression Screening and Follow-up Plan: Patient was screened for depression during today's encounter. They screened negative with a PHQ-2 score of 0.    Falls Plan of Care: Recommended assistive device to help with gait and balance.       History of Present Illness   {Disappearing Hyperlinks I Encounters * My Last Note * Since Last Visit * History :53966}  Patient sustained a fall on  and was seen in the emergency room.  He sustained a laceration above his left eyebrow.  4 sutures were placed.  He is here in the office today for suture removal.  These were removed without difficulty bleeding no signs of infection.  Patient still with some residual dizziness off and on since his fall.  Blood work in the emergency room and EKG were stable.  I did recommend to the patient that he make sure he is staying  "well-hydrated.  In addition if his dizziness continues he should be seen back in the office next week for further testing.        Review of Systems   Constitutional: Negative.  Negative for fatigue.   HENT: Negative.  Negative for congestion, postnasal drip, rhinorrhea and trouble swallowing.    Eyes: Negative.  Negative for visual disturbance.   Respiratory: Negative.  Negative for choking and shortness of breath.    Cardiovascular: Negative.  Negative for chest pain.   Gastrointestinal: Negative.    Endocrine: Negative.    Genitourinary: Negative.    Musculoskeletal:  Positive for gait problem. Negative for arthralgias, back pain, myalgias and neck pain.   Skin:  Positive for wound.   Neurological:  Negative for dizziness and headaches.   Psychiatric/Behavioral: Negative.         Objective   {Disappearing Hyperlinks   Review Vitals * Enter New Vitals * Results Review * Labs * Imaging * Cardiology * Procedures * Lung Cancer Screening :24091}  /76   Pulse 80   Temp (!) 97.3 °F (36.3 °C) (Temporal)   Resp 16   Ht 5' 11\" (1.803 m)   Wt 81.6 kg (180 lb)   SpO2 95%   BMI 25.10 kg/m²     Physical Exam  Vitals reviewed.   Constitutional:       Appearance: Normal appearance. He is obese.   HENT:      Head: Normocephalic and atraumatic.      Nose: Nose normal.      Mouth/Throat:      Mouth: Mucous membranes are moist.   Eyes:      Extraocular Movements: Extraocular movements intact.      Pupils: Pupils are equal, round, and reactive to light.   Cardiovascular:      Rate and Rhythm: Normal rate and regular rhythm.      Pulses: Normal pulses.      Heart sounds: Normal heart sounds.   Pulmonary:      Effort: Pulmonary effort is normal.      Breath sounds: Normal breath sounds.   Musculoskeletal:         General: Normal range of motion.   Skin:     General: Skin is warm.      Findings: Bruising present.          Neurological:      General: No focal deficit present.      Mental Status: He is alert and oriented to " person, place, and time.      Gait: Gait abnormal.   Psychiatric:         Mood and Affect: Mood normal.         Behavior: Behavior normal.         Thought Content: Thought content normal.         Judgment: Judgment normal.       Suture removal    Date/Time: 6/28/2024 3:20 PM    Performed by: FREIDA Keyes  Authorized by: FREIDA Keyes  Universal Protocol:  Consent: Verbal consent obtained.  Consent given by: patient  Patient understanding: patient states understanding of the procedure being performed  Patient identity confirmed: verbally with patient      Patient location:  Clinic  Location:     Laterality:  Left    Location:  Head/neck    Head/neck location:  Eyebrow    Eyebrow location:  L eyebrow  Procedure details:     Tools used:  Scissors and tweezers    Wound appearance:  No sign(s) of infection, good wound healing and clean    Number of sutures removed:  4  Post-procedure details:     Post-removal:  No dressing applied    Patient tolerance of procedure:  Tolerated well, no immediate complications        Administrative Statements {Disappearing Hyperlinks I  [facility] of Service * [id number]}

## 2024-07-18 ENCOUNTER — RA CDI HCC (OUTPATIENT)
Dept: OTHER | Facility: HOSPITAL | Age: 74
End: 2024-07-18

## 2024-07-23 ENCOUNTER — RA CDI HCC (OUTPATIENT)
Dept: OTHER | Facility: HOSPITAL | Age: 74
End: 2024-07-23

## 2024-07-27 PROBLEM — R74.01 ELEVATED AST (SGOT): Status: RESOLVED | Noted: 2018-08-30 | Resolved: 2024-07-27

## 2024-07-27 PROBLEM — R26.81 GAIT INSTABILITY: Status: ACTIVE | Noted: 2024-07-27

## 2024-07-27 PROBLEM — D69.6 PLATELETS DECREASED (HCC): Status: RESOLVED | Noted: 2021-06-14 | Resolved: 2024-07-27

## 2024-07-28 PROBLEM — S01.112A LACERATION OF LEFT EYEBROW: Status: RESOLVED | Noted: 2024-06-28 | Resolved: 2024-07-28

## 2024-08-26 PROBLEM — Z00.00 MEDICARE ANNUAL WELLNESS VISIT, SUBSEQUENT: Status: RESOLVED | Noted: 2019-01-28 | Resolved: 2024-08-26

## 2024-10-22 ENCOUNTER — VBI (OUTPATIENT)
Dept: ADMINISTRATIVE | Facility: OTHER | Age: 74
End: 2024-10-22

## 2024-10-22 ENCOUNTER — OFFICE VISIT (OUTPATIENT)
Dept: PODIATRY | Facility: CLINIC | Age: 74
End: 2024-10-22
Payer: COMMERCIAL

## 2024-10-22 VITALS
BODY MASS INDEX: 25.26 KG/M2 | HEIGHT: 71 IN | SYSTOLIC BLOOD PRESSURE: 121 MMHG | WEIGHT: 180.4 LBS | DIASTOLIC BLOOD PRESSURE: 77 MMHG | HEART RATE: 73 BPM

## 2024-10-22 DIAGNOSIS — L90.9 FAT PAD ATROPHY OF FOOT: Primary | ICD-10-CM

## 2024-10-22 DIAGNOSIS — M21.961 METATARSAL DEFORMITY, RIGHT: ICD-10-CM

## 2024-10-22 DIAGNOSIS — L84 CALLUS: ICD-10-CM

## 2024-10-22 DIAGNOSIS — M20.41 HAMMERTOE OF RIGHT FOOT: ICD-10-CM

## 2024-10-22 PROCEDURE — 99213 OFFICE O/P EST LOW 20 MIN: CPT | Performed by: PODIATRIST

## 2024-10-22 NOTE — PROGRESS NOTES
"  Name: Krzysztof Hylton      : 1950      MRN: [id number]  Encounter Provider: Kirill Martel DPM  Encounter Date: 10/22/2024   Encounter department: [facility] [place]    Assessment & Plan     1. Fat pad atrophy of foot  -     Ambulatory referral to Physical Therapy; Future  2. Callus  -     Ambulatory referral to Physical Therapy; Future  3. Hammertoe of right foot  -     Ambulatory referral to Physical Therapy; Future  4. Metatarsal deformity, right  -     Ambulatory referral to Physical Therapy; Future        Trimming callus today right foot.  Custom orthotics.  Pt scheduled with Dr. Bernstein in December, Can review xray at that appointment.         No follow-ups on file.    Subjective     Location: Right foot callus area  Type of pain: Sharp pain with standing  Duration and Onset: 1 month ago  Aggravating factors: Weightbearing  Treatment so far: None    Has had consistent numbness to both sides of feet.      Constitutional:  Negative for chills and fever.   Respiratory:  Negative for chest tightness and shortness of breath.    Gastrointestinal:  Negative for nausea and vomiting.     Current Outpatient Medications on File Prior to Visit   Medication Sig    cholecalciferol (VITAMIN D3) 400 units tablet Take 400 Units by mouth daily    ibuprofen (MOTRIN) 600 mg tablet Take 600 mg by mouth every 6 (six) hours as needed for mild pain    Magnesium 400 MG TABS Take 1 tablet (400 mg total) by mouth daily       Objective     /77   Pulse 73   Ht 5' 11\" (1.803 m)   Wt 81.8 kg (180 lb 6.4 oz)   BMI 25.16 kg/m²     Vascular: Intact pedal pulses bilateral DP and PT.  Neurological: Gross protective sensation intact bilateral  Musculoskeletal: Muscle strength bilateral intact with dorsiflexion, inversion, eversion and plantarflexion.  Plantarflexion of the third metatarsal noted with callus formation on the right foot tenderness on palpation noted to this area.  There is decrease in " metatarsal fat pad noted to the forefoot.  Good range of motion noted with dorsiflexion at the level of the ankle.  Dermatological: No open lesions or ulcerations noted bilateral.

## 2024-10-22 NOTE — TELEPHONE ENCOUNTER
10/22/24 9:30 AM     Chart reviewed for [facility]: Colonoscopy was/were not submitted to the patient's insurance.     Maura Fierro MA   [facility]

## 2024-12-06 ENCOUNTER — VBI (OUTPATIENT)
Dept: ADMINISTRATIVE | Facility: OTHER | Age: 74
End: 2024-12-06

## 2024-12-06 NOTE — TELEPHONE ENCOUNTER
12/06/24 11:42 AM     Chart reviewed for [facility]: Colonoscopy was/were not submitted to the patient's insurance.     Maura Fierro MA   [facility]

## 2025-01-10 ENCOUNTER — OFFICE VISIT (OUTPATIENT)
Dept: PODIATRY | Facility: CLINIC | Age: 75
End: 2025-01-10
Payer: COMMERCIAL

## 2025-01-10 VITALS — WEIGHT: 180 LBS | HEIGHT: 70 IN | BODY MASS INDEX: 25.77 KG/M2

## 2025-01-10 DIAGNOSIS — M79.671 PAIN IN RIGHT FOOT: ICD-10-CM

## 2025-01-10 DIAGNOSIS — M20.41 HAMMERTOE OF RIGHT FOOT: ICD-10-CM

## 2025-01-10 DIAGNOSIS — G62.9 NEUROPATHY: ICD-10-CM

## 2025-01-10 DIAGNOSIS — L84 CALLUS: Primary | ICD-10-CM

## 2025-01-10 DIAGNOSIS — I87.2 VENOUS STASIS DERMATITIS OF RIGHT LOWER EXTREMITY: ICD-10-CM

## 2025-01-10 DIAGNOSIS — M24.50 CONTRACTURE OF JOINT: ICD-10-CM

## 2025-01-10 PROCEDURE — 11055 PARING/CUTG B9 HYPRKER LES 1: CPT | Performed by: PODIATRIST

## 2025-01-10 PROCEDURE — 99212 OFFICE O/P EST SF 10 MIN: CPT | Performed by: PODIATRIST

## 2025-01-10 NOTE — PROGRESS NOTES
Name: Krzysztof Hylton      : 1950      MRN: [id number]  Encounter Provider: Lalo Bernstein DPM  Encounter Date: 1/10/2025   Encounter department: [facility] [place]  :  Assessment & Plan  Callus       Debride Tyloma to dermal layer x 1 with the use of a 312 blade and sharp dissection.     Recommend that the patient follow through with getting the custom inserts that were prescribed by Dr. Martel at his last visit.  He was given a reprint of the orders along with the addresses of what physical therapy departments for [facility] produces them.  Hammertoe of right foot         Contracture of joint         Pain in right foot         Venous stasis dermatitis of right lower extremity         Neuropathy             History of Present Illness   HPI  Krzysztof Hylton is a 74 y.o. male who presents with chief complaint of a painful callus on the bottom of his right foot.  Patient relates that he attempted to cut his nails but is having a lot of trouble seeing them.  He has peripheral neuropathy and peripheral vascular disease.  History obtained from: patient    Review of Systems  Medical History Reviewed by provider this encounter:     .  Current Outpatient Medications on File Prior to Visit   Medication Sig Dispense Refill    cholecalciferol (VITAMIN D3) 400 units tablet Take 400 Units by mouth daily      ibuprofen (MOTRIN) 600 mg tablet Take 600 mg by mouth every 6 (six) hours as needed for mild pain      Magnesium 400 MG TABS Take 1 tablet (400 mg total) by mouth daily 30 tablet 5     No current facility-administered medications on file prior to visit.      Social History     Tobacco Use    Smoking status: Former     Current packs/day: 0.00     Types: Cigarettes     Quit date:      Years since quittin.0    Smokeless tobacco: Never   Vaping Use    Vaping status: Never Used   Substance and Sexual Activity    Alcohol use: Not Currently     Comment: Whiskey and beer    Drug use: Yes     Types: Marijuana  "    Comment: medical marijuana    Sexual activity: Not on file        Objective   Ht 5' 10\" (1.778 m)   Wt 81.6 kg (180 lb)   BMI 25.83 kg/m²      Physical Exam  Vascular status is 0/4 DP PT negative digital hair delayed capillary refill with slight edema present bilaterally.  Capillary refill is approximately 3 to 4 seconds.    Derm hypertrophic tissue to central IPK is noted on the plantar aspect of the fourthon the right foot.  No trophic changes are noted beneath the hypertrophic lesion.  Nails are brittle hypertrophic yellow-white discoloration with subungual debris x 6.  There is an increased thickness and the nails are approximately 2 mm.    Ortho hammertoe deformities are present on the fourth and fifth digits bilaterally with the retrograde pressure recording on the metatarsal.  Slight decline metatarsal is noted on the fourth and fifth toes.    Neuro light touch is intact and 0.5 monofilament test was absent at all sites that were tested.  The sites that were tested were the plantar aspect of the hallux, plantar aspect of the third and fourth toe, submet 3, and the plantar arch area.  Only spot the patient was able to feel lightly was the plantar arch area.  Administrative Statements   I have spent a total time of 15 minutes in caring for this patient on the day of the visit/encounter including Risks and benefits of tx options, Instructions for management, Patient and family education, Importance of tx compliance, Risk factor reductions, Counseling / Coordination of care, Documenting in the medical record, and Obtaining or reviewing history  .   "

## 2025-01-13 ENCOUNTER — TELEPHONE (OUTPATIENT)
Dept: FAMILY MEDICINE CLINIC | Facility: CLINIC | Age: 75
End: 2025-01-13

## 2025-01-13 NOTE — TELEPHONE ENCOUNTER
Lmom & mailed postcard to reschedule 1/16/25 cancelled appt with one of the other providers as Dr. Del Real is retiring the end of the month BB

## 2025-02-04 ENCOUNTER — OFFICE VISIT (OUTPATIENT)
Dept: FAMILY MEDICINE CLINIC | Facility: CLINIC | Age: 75
End: 2025-02-04
Payer: COMMERCIAL

## 2025-02-04 VITALS
TEMPERATURE: 97.6 F | DIASTOLIC BLOOD PRESSURE: 72 MMHG | SYSTOLIC BLOOD PRESSURE: 114 MMHG | OXYGEN SATURATION: 97 % | BODY MASS INDEX: 25.88 KG/M2 | RESPIRATION RATE: 18 BRPM | HEIGHT: 70 IN | HEART RATE: 72 BPM | WEIGHT: 180.8 LBS

## 2025-02-04 DIAGNOSIS — M25.562 ACUTE PAIN OF LEFT KNEE: ICD-10-CM

## 2025-02-04 DIAGNOSIS — Z11.59 NEED FOR HEPATITIS C SCREENING TEST: Primary | ICD-10-CM

## 2025-02-04 DIAGNOSIS — G43.709 CHRONIC MIGRAINE WITHOUT AURA WITHOUT STATUS MIGRAINOSUS, NOT INTRACTABLE: ICD-10-CM

## 2025-02-04 DIAGNOSIS — K42.9 UMBILICAL HERNIA WITHOUT OBSTRUCTION AND WITHOUT GANGRENE: ICD-10-CM

## 2025-02-04 DIAGNOSIS — N18.2 CKD (CHRONIC KIDNEY DISEASE) STAGE 2, GFR 60-89 ML/MIN: ICD-10-CM

## 2025-02-04 DIAGNOSIS — Z12.11 COLON CANCER SCREENING: ICD-10-CM

## 2025-02-04 DIAGNOSIS — R26.81 GAIT INSTABILITY: ICD-10-CM

## 2025-02-04 DIAGNOSIS — I87.2 VENOUS STASIS DERMATITIS OF RIGHT LOWER EXTREMITY: ICD-10-CM

## 2025-02-04 DIAGNOSIS — Z13.6 SCREENING FOR CARDIOVASCULAR CONDITION: ICD-10-CM

## 2025-02-04 PROBLEM — L97.919 CHRONIC VENOUS HYPERTENSION (IDIOPATHIC) WITH ULCER OF RIGHT LOWER EXTREMITY (HCC): Status: RESOLVED | Noted: 2022-10-13 | Resolved: 2025-02-04

## 2025-02-04 PROBLEM — B35.1 ONYCHOMYCOSIS OF TOENAIL: Status: RESOLVED | Noted: 2021-06-14 | Resolved: 2025-02-04

## 2025-02-04 PROBLEM — I87.311 CHRONIC VENOUS HYPERTENSION (IDIOPATHIC) WITH ULCER OF RIGHT LOWER EXTREMITY (HCC): Status: RESOLVED | Noted: 2022-10-13 | Resolved: 2025-02-04

## 2025-02-04 PROBLEM — R94.31 ABNORMAL EKG: Status: RESOLVED | Noted: 2018-09-26 | Resolved: 2025-02-04

## 2025-02-04 PROBLEM — R00.0 SINUS TACHYCARDIA: Status: RESOLVED | Noted: 2018-08-30 | Resolved: 2025-02-04

## 2025-02-04 PROBLEM — I10 PRIMARY HYPERTENSION: Status: RESOLVED | Noted: 2018-07-24 | Resolved: 2025-02-04

## 2025-02-04 PROBLEM — L97.521 ULCER OF TOE OF LEFT FOOT, LIMITED TO BREAKDOWN OF SKIN (HCC): Status: RESOLVED | Noted: 2023-09-20 | Resolved: 2025-02-04

## 2025-02-04 PROCEDURE — G2211 COMPLEX E/M VISIT ADD ON: HCPCS | Performed by: NURSE PRACTITIONER

## 2025-02-04 PROCEDURE — G0439 PPPS, SUBSEQ VISIT: HCPCS | Performed by: NURSE PRACTITIONER

## 2025-02-04 PROCEDURE — 99214 OFFICE O/P EST MOD 30 MIN: CPT | Performed by: NURSE PRACTITIONER

## 2025-02-04 NOTE — PATIENT INSTRUCTIONS
Medicare Preventive Visit Patient Instructions  Thank you for completing your Welcome to Medicare Visit or Medicare Annual Wellness Visit today. Your next wellness visit will be due in one year (2/5/2026).  The screening/preventive services that you may require over the next 5-10 years are detailed below. Some tests may not apply to you based off risk factors and/or age. Screening tests ordered at today's visit but not completed yet may show as past due. Also, please note that scanned in results may not display below.  Preventive Screenings:  Service Recommendations Previous Testing/Comments   Colorectal Cancer Screening  Colonoscopy    Fecal Occult Blood Test (FOBT)/Fecal Immunochemical Test (FIT)  Fecal DNA/Cologuard Test  Flexible Sigmoidoscopy Age: 45-75 years old   Colonoscopy: every 10 years (May be performed more frequently if at higher risk)  OR  FOBT/FIT: every 1 year  OR  Cologuard: every 3 years  OR  Sigmoidoscopy: every 5 years  Screening may be recommended earlier than age 45 if at higher risk for colorectal cancer. Also, an individualized decision between you and your healthcare provider will decide whether screening between the ages of 76-85 would be appropriate. Colonoscopy: Not on file  FOBT/FIT: Not on file  Cologuard: Not on file  Sigmoidoscopy: Not on file          Prostate Cancer Screening Individualized decision between patient and health care provider in men between ages of 55-69   Medicare will cover every 12 months beginning on the day after your 50th birthday PSA: No results in last 5 years           Hepatitis C Screening Once for adults born between 1945 and 1965  More frequently in patients at high risk for Hepatitis C Hep C Antibody: Not on file        Diabetes Screening 1-2 times per year if you're at risk for diabetes or have pre-diabetes Fasting glucose: No results in last 5 years (No results in last 5 years)  A1C: No results in last 5 years (No results in last 5 years)       Cholesterol Screening Once every 5 years if you don't have a lipid disorder. May order more often based on risk factors. Lipid panel: Not on file         Other Preventive Screenings Covered by Medicare:  Abdominal Aortic Aneurysm (AAA) Screening: covered once if your at risk. You're considered to be at risk if you have a family history of AAA or a male between the age of 65-75 who smoking at least 100 cigarettes in your lifetime.  Lung Cancer Screening: covers low dose CT scan once per year if you meet all of the following conditions: (1) Age 55-77; (2) No signs or symptoms of lung cancer; (3) Current smoker or have quit smoking within the last 15 years; (4) You have a tobacco smoking history of at least 20 pack years (packs per day x number of years you smoked); (5) You get a written order from a healthcare provider.  Glaucoma Screening: covered annually if you're considered high risk: (1) You have diabetes OR (2) Family history of glaucoma OR (3)  aged 50 and older OR (4)  American aged 65 and older  Osteoporosis Screening: covered every 2 years if you meet one of the following conditions: (1) Have a vertebral abnormality; (2) On glucocorticoid therapy for more than 3 months; (3) Have primary hyperparathyroidism; (4) On osteoporosis medications and need to assess response to drug therapy.  HIV Screening: covered annually if you're between the age of 15-65. Also covered annually if you are younger than 15 and older than 65 with risk factors for HIV infection. For pregnant patients, it is covered up to 3 times per pregnancy.    Immunizations:  Immunization Recommendations   Influenza Vaccine Annual influenza vaccination during flu season is recommended for all persons aged >= 6 months who do not have contraindications   Pneumococcal Vaccine   * Pneumococcal conjugate vaccine = PCV13 (Prevnar 13), PCV15 (Vaxneuvance), PCV20 (Prevnar 20)  * Pneumococcal polysaccharide vaccine = PPSV23  (Pneumovax) Adults 19-65 yo with certain risk factors or if 65+ yo  If never received any pneumonia vaccine: recommend Prevnar 20 (PCV20)  Give PCV20 if previously received 1 dose of PCV13 or PPSV23   Hepatitis B Vaccine 3 dose series if at intermediate or high risk (ex: diabetes, end stage renal disease, liver disease)   Respiratory syncytial virus (RSV) Vaccine - COVERED BY MEDICARE PART D  * RSVPreF3 (Arexvy) CDC recommends that adults 60 years of age and older may receive a single dose of RSV vaccine using shared clinical decision-making (SCDM)   Tetanus (Td) Vaccine - COST NOT COVERED BY MEDICARE PART B Following completion of primary series, a booster dose should be given every 10 years to maintain immunity against tetanus. Td may also be given as tetanus wound prophylaxis.   Tdap Vaccine - COST NOT COVERED BY MEDICARE PART B Recommended at least once for all adults. For pregnant patients, recommended with each pregnancy.   Shingles Vaccine (Shingrix) - COST NOT COVERED BY MEDICARE PART B  2 shot series recommended in those 19 years and older who have or will have weakened immune systems or those 50 years and older     Health Maintenance Due:      Topic Date Due    Hepatitis C Screening  Never done    Colorectal Cancer Screening  12/27/2022     Immunizations Due:      Topic Date Due    COVID-19 Vaccine (3 - 2024-25 season) 09/01/2024     Advance Directives   What are advance directives?  Advance directives are legal documents that state your wishes and plans for medical care. These plans are made ahead of time in case you lose your ability to make decisions for yourself. Advance directives can apply to any medical decision, such as the treatments you want, and if you want to donate organs.   What are the types of advance directives?  There are many types of advance directives, and each state has rules about how to use them. You may choose a combination of any of the following:  Living will:  This is a written  record of the treatment you want. You can also choose which treatments you do not want, which to limit, and which to stop at a certain time. This includes surgery, medicine, IV fluid, and tube feedings.   Durable power of  for healthcare (DPAHC):  This is a written record that states who you want to make healthcare choices for you when you are unable to make them for yourself. This person, called a proxy, is usually a family member or a friend. You may choose more than 1 proxy.  Do not resuscitate (DNR) order:  A DNR order is used in case your heart stops beating or you stop breathing. It is a request not to have certain forms of treatment, such as CPR. A DNR order may be included in other types of advance directives.  Medical directive:  This covers the care that you want if you are in a coma, near death, or unable to make decisions for yourself. You can list the treatments you want for each condition. Treatment may include pain medicine, surgery, blood transfusions, dialysis, IV or tube feedings, and a ventilator (breathing machine).  Values history:  This document has questions about your views, beliefs, and how you feel and think about life. This information can help others choose the care that you would choose.  Why are advance directives important?  An advance directive helps you control your care. Although spoken wishes may be used, it is better to have your wishes written down. Spoken wishes can be misunderstood, or not followed. Treatments may be given even if you do not want them. An advance directive may make it easier for your family to make difficult choices about your care.   Weight Management   Why it is important to manage your weight:  Being overweight increases your risk of health conditions such as heart disease, high blood pressure, type 2 diabetes, and certain types of cancer. It can also increase your risk for osteoarthritis, sleep apnea, and other respiratory problems. Aim for a slow,  steady weight loss. Even a small amount of weight loss can lower your risk of health problems.  How to lose weight safely:  A safe and healthy way to lose weight is to eat fewer calories and get regular exercise. You can lose up about 1 pound a week by decreasing the number of calories you eat by 500 calories each day.   Healthy meal plan for weight management:  A healthy meal plan includes a variety of foods, contains fewer calories, and helps you stay healthy. A healthy meal plan includes the following:  Eat whole-grain foods more often.  A healthy meal plan should contain fiber. Fiber is the part of grains, fruits, and vegetables that is not broken down by your body. Whole-grain foods are healthy and provide extra fiber in your diet. Some examples of whole-grain foods are whole-wheat breads and pastas, oatmeal, brown rice, and bulgur.  Eat a variety of vegetables every day.  Include dark, leafy greens such as spinach, kale, daniel greens, and mustard greens. Eat yellow and orange vegetables such as carrots, sweet potatoes, and winter squash.   Eat a variety of fruits every day.  Choose fresh or canned fruit (canned in its own juice or light syrup) instead of juice. Fruit juice has very little or no fiber.  Eat low-fat dairy foods.  Drink fat-free (skim) milk or 1% milk. Eat fat-free yogurt and low-fat cottage cheese. Try low-fat cheeses such as mozzarella and other reduced-fat cheeses.  Choose meat and other protein foods that are low in fat.  Choose beans or other legumes such as split peas or lentils. Choose fish, skinless poultry (chicken or turkey), or lean cuts of red meat (beef or pork). Before you cook meat or poultry, cut off any visible fat.   Use less fat and oil.  Try baking foods instead of frying them. Add less fat, such as margarine, sour cream, regular salad dressing and mayonnaise to foods. Eat fewer high-fat foods. Some examples of high-fat foods include french fries, doughnuts, ice cream, and  "cakes.  Eat fewer sweets.  Limit foods and drinks that are high in sugar. This includes candy, cookies, regular soda, and sweetened drinks.  Exercise:  Exercise at least 30 minutes per day on most days of the week. Some examples of exercise include walking, biking, dancing, and swimming. You can also fit in more physical activity by taking the stairs instead of the elevator or parking farther away from stores. Ask your healthcare provider about the best exercise plan for you.      © Copyright [employer] 2018 Information is for End User's use only and may not be sold, redistributed or otherwise used for commercial purposes. All illustrations and images included in CareNotes® are the copyrighted property of [employer] or [employer]      Patient Education     Abdominal wall hernias   The Basics   Written by the doctors and editors at [facility]   What is an abdominal wall hernia? -- The internal organs and tissues in the belly are held in place by a tough outer wall of tissue called the \"abdominal wall.\" An abdominal hernia is an area in that wall that is weak or torn. Often, when there is a hernia, organs or tissues that are normally held in place by the abdominal wall bulge or stick out through the weak or torn spot.  The size of the bulge can change depending on how much pressure is put on the abdominal wall. For example, straining when coughing or having a bowel movement can make a hernia look bigger. Lying down overnight can make the hernia look smaller, or even disappear, in the morning.  There are many different kinds of abdominal wall hernias (figure 1).  What are the symptoms of abdominal wall hernias? -- Abdominal wall hernias do not always cause symptoms. When they do, they can cause some or all of these symptoms:   A bulge somewhere on the trunk of the body - This bulge can be so small that you don't even realize it's there.   Pain, especially when coughing, straining, or using nearby " "muscles   A pulling sensation around the bulge   Nausea or vomiting if part of the intestine is blocked in the hernia  Abdominal wall hernias can balloon out and form a sac. That sac can hold a loop of intestine or a piece of fat that should normally be tucked inside of the belly. This can be painful and even dangerous if the tissue in the hernia gets trapped and unable to slide back into the belly. When this happens, the tissue does not get enough blood, so it can become swollen or even die (figure 2).  Should I see a doctor or nurse? -- Yes. See a doctor or nurse if you have any of the symptoms of a hernia. In most cases, doctors can diagnose a hernia just by doing an exam. During the exam, the doctor might ask you to cough or bear down while pressing on your hernia. This might be uncomfortable, but it is necessary to find the source of the problem.  Most of the time, the contents of the hernia can be \"reduced,\" or gently pushed back into the belly. But sometimes, the hernia gets trapped and won't go back in. If that happens, the tissue that is trapped can get damaged.  If you develop severe pain around a hernia bulge or feel sick, call your doctor or surgeon right away.  How are hernias treated? -- Not all hernias need treatment right away. But many do need to be repaired with surgery.  Surgeons can repair most hernias in 1 of 2 ways. The right surgery for you depends on the size of your hernia, where on the abdominal wall it is, whether this is the first time it is getting repaired, what your general health is like, and your surgeon's experience.  The 2 types of surgery are:   Open surgery - During an open surgery, the doctor makes 1 large cut near the hernia to repair it.   Minimally invasive surgery - \"Minimally invasive\" surgery lets the doctor make smaller cuts in the skin. They insert long, thin tools through the cuts. One of the tools has a camera (called a \"laparoscope\") on the end, which sends pictures " "to a TV screen. The doctor can look at the screen to see inside the body. Then, they use the long tools to do the surgery. They can control the tools directly, or with the help of a robot (this is called \"robot-assisted\" surgery).  If your hernia has reduced the blood supply to a loop of intestine, your doctor might need to remove that piece of intestine. Usually, they will then sew the intestine back together.  The recovery and aftercare for each type of hernia repair is different. Your doctor or nurse can tell you what to expect after your surgery.  All topics are updated as new evidence becomes available and our peer review process is complete.  This topic retrieved from [email or web address] on: Feb 26, 2024.  Topic 46522 Version 10.0  Release: 32.2.4 - C32.56  © 2024 UpToDate, Inc. and/or its affiliates. All rights reserved.  figure 1: Abdominal wall hernias     Abdominal wall hernias can happen in different parts of the torso:  Incisional hernias happen along incisions from surgery.  Umbilical hernias happen at the belly button.  Epigastric hernias happen in the midline above the belly button.  Spigelian hernias happen to the left or right of the midline, where 2 layers of muscle meet.  Lumbar hernias (not shown) happen at the back.  Inguinal hernias happen in the groin area.  Femoral hernias happen where the thigh joins the torso.  Graphic 15807 Version 4.0  figure 2: Intestines bulging through hernia     In some cases, a loop of intestine can poke through a hernia. Often, surgeons can push the loop of intestine back in. But if the loop gets trapped or does not get enough blood, surgeons sometimes have to remove it and reconnect the 2 ends of intestine that are left.  Graphic 48487 Version 3.0  Consumer Information Use and Disclaimer   Disclaimer: This generalized information is a limited summary of diagnosis, treatment, and/or medication information. It is not meant to be comprehensive and should be used as a tool to " "help the user understand and/or assess potential diagnostic and treatment options. It does NOT include all information about conditions, treatments, medications, side effects, or risks that may apply to a specific patient. It is not intended to be medical advice or a substitute for the medical advice, diagnosis, or treatment of a health care provider based on the health care provider's examination and assessment of a patient's specific and unique circumstances. Patients must speak with a health care provider for complete information about their health, medical questions, and treatment options, including any risks or benefits regarding use of medications. This information does not endorse any treatments or medications as safe, effective, or approved for treating a specific patient. UpToDate, Inc. and its affiliates disclaim any warranty or liability relating to this information or the use thereof.The use of this information is governed by the Terms of Use, available at https://www.Yl[employer].Wavo.me/en/know/clinical-effectiveness-terms. 2024© UpToDate, Inc. and its affiliates and/or licensors. All rights reserved.  Copyright   © 2024 UpToDate, Inc. and/or its affiliates. All rights reserved.    Patient Education     Chronic kidney disease   The Basics   Written by the doctors and editors at [employer]   What is chronic kidney disease? -- Chronic kidney disease, or \"CKD,\" is when the kidneys stop working as well as they should. When they are working normally, the kidneys filter blood and remove waste and excess salt and water (figure 1).  In people with CKD, the kidneys slowly lose the ability to filter blood. In time, the kidneys can stop working completely. That is why it is so important to keep CKD from getting worse.  What are the symptoms of CKD? -- At first, CKD causes no symptoms. As the disease gets worse, it can:   Make your feet, ankles, or legs swell (called \"edema\")   Give you high blood pressure   Make you " "very tired   Damage your bones  Will I need tests? -- Yes. Your doctor will want to see you regularly. You will probably have appointments at least once a year, and you will get regular tests to check your kidneys. These include blood and urine tests.  If your CKD gets worse over time, you will probably need to see a \"nephrologist.\" This is a doctor who takes care of people with kidney disease.  Is there anything I can do to keep my kidneys from getting worse? -- Yes. If you have CKD, you can protect your kidneys if you:   Take all of your prescribed medicines every day, and follow all of your doctor's instructions for how to take them.   Keep your blood sugar in a healthy range, if you have diabetes.   Change your diet, if your doctor or nurse recommends to. They might suggest working with a dietitian (nutrition expert).   Quit smoking, if you smoke.   Lose weight, if you have excess body weight.   Avoid medicines that can harm the kidneys - One example is nonsteroidal antiinflammatory drugs (\"NSAIDs\"). These medicines include ibuprofen (sample brand names: Advil, Motrin) and naproxen (sample brand name: Aleve). There are other medicines that people with CKD need to avoid, too. Check with your doctor, nurse, or kidney specialist before starting any new medicines or supplements, even those you can buy without a prescription.  How is CKD treated? -- People in the early stages of CKD can take medicines to keep the disease from getting worse. For example, many people with CKD should take medicines known as \"ACE inhibitors\" or \"angiotensin receptor blockers.\" If your doctor or nurse prescribes these medicines, it is very important that you take them every day as directed. If they cause side effects or cost too much, tell your doctor or nurse. They might have solutions to offer.  What happens if my kidneys stop working completely? -- If your kidneys can no longer filter blood properly, you can choose between 3 different " treatments to take over their job. Your choices are:   Kidney transplant - After transplant surgery, the new kidney can do the job of your own kidneys. If you have a kidney transplant, you will need to take medicines for the rest of your life to keep your body from reacting badly to the new kidney. (You only need 1 kidney to live.)   Hemodialysis - This is a procedure in which a dialysis machine takes over the job of the kidneys. The machine pumps blood out of the body, filters it, and returns it to the body. If you choose hemodialysis, you will need to be hooked up to the machine at least 3 times a week for several hours for the rest of your life. Before you start, you will also need to have surgery to prepare a blood vessel for attachment to the machine.   Peritoneal dialysis - This involves piping a special fluid into the belly every day. If you choose peritoneal dialysis, you will need surgery to have a tube implanted in your belly. Then, you will have to learn how to pipe the fluid in and out through that tube.  How do I choose between the different treatment options? -- You and your doctor will need to work together to find a treatment that's right for you. Kidney transplant surgery is usually the best option for most people. But often, there are no kidneys available for transplant.  Ask your doctor to explain all of your options and how they might work for you. Then, talk openly with them about how you feel about all of the options. You might even decide that you do not want any treatment. That is your choice.  All topics are updated as new evidence becomes available and our peer review process is complete.  This topic retrieved from [employer] on: May 18, 2024.  Topic 75801 Version 34.0  Release: 32.4.3 - C32.137  © 2024 UpToDate, Inc. and/or its affiliates. All rights reserved.  figure 1: Anatomy of the urinary tract     Urine is made by the kidneys. It passes from the kidneys into the bladder through 2 tubes  called the ureters. Then, it leaves the bladder through another tube called the urethra.  Graphic 16184 Version 8.0  Consumer Information Use and Disclaimer   Disclaimer: This generalized information is a limited summary of diagnosis, treatment, and/or medication information. It is not meant to be comprehensive and should be used as a tool to help the user understand and/or assess potential diagnostic and treatment options. It does NOT include all information about conditions, treatments, medications, side effects, or risks that may apply to a specific patient. It is not intended to be medical advice or a substitute for the medical advice, diagnosis, or treatment of a health care provider based on the health care provider's examination and assessment of a patient's specific and unique circumstances. Patients must speak with a health care provider for complete information about their health, medical questions, and treatment options, including any risks or benefits regarding use of medications. This information does not endorse any treatments or medications as safe, effective, or approved for treating a specific patient. UpToDate, Inc. and its affiliates disclaim any warranty or liability relating to this information or the use thereof.The use of this information is governed by the Terms of Use, available at https://www.woltersArteaus Therapeuticsuwer.com/en/know/clinical-effectiveness-terms. 2024© UpToDate, Inc. and its affiliates and/or licensors. All rights reserved.  Copyright   © 2024 UpToDate, Inc. and/or its affiliates. All rights reserved.

## 2025-02-04 NOTE — PROGRESS NOTES
Name: Krzysztof Hylton      : 1950      MRN: [id number]  Encounter Provider: FREIDA Carlin  Encounter Date: 2025   Encounter department: [facility]    Assessment & Plan  Need for hepatitis C screening test         Chronic migraine without aura without status migrainosus, not intractable  Patient continues on magnesium with good control.       Screening for cardiovascular condition  Due for lipid panel.  Orders:    Lipid Panel with Direct LDL reflex; Future    CKD (chronic kidney disease) stage 2, GFR 60-89 ml/min  Lab Results   Component Value Date    EGFR 62 2024    EGFR 60 2023    EGFR 69 2021    CREATININE 1.17 2024    CREATININE 1.20 2023    CREATININE 1.10 2021   Patient's last blood work in 2024 showed stability of his kidney function.  Discussed    Orders:    CBC and differential; Future    Comprehensive metabolic panel; Future    Colon cancer screening  Fecal immunochemical testing performed in .  Patient agrees to have this done again.  Orders:    Occult Blood, Fecal Immunochemical; Future    Acute pain of left knee  Patient with pain of his left knee.  He does ambulate with a cane.  He did have 1 fall last year.  He has not had a x-ray of his knee.  X-ray ordered.  Referral to orthopedics.  Tylenol arthritis and topical preparations recommended.  Orders:    XR knee 3 vw left non injury; Future    Ambulatory Referral to Orthopedic Surgery; Future    Venous stasis dermatitis of right lower extremity  Follows with podiatry.  Compression stockings recommended in the past.         Gait instability  Ambulates with cane.  1 fall in 2024.           Depression Screening and Follow-up Plan: Patient was screened for depression during today's encounter. They screened negative with a PHQ-2 score of 0.    Falls Plan of Care: Recommended assistive device to help with gait and balance. Medications that increase falls were reviewed. Home  safety education provided.       Preventive health issues were discussed with patient, and age appropriate screening tests were ordered as noted in patient's After Visit Summary. Personalized health advice and appropriate referrals for health education or preventive services given if needed, as noted in patient's After Visit Summary.    History of Present Illness     Patient presents to the office today for Annual Medicare Wellness visit and follow up.  Patient with new abdominal hernia.  Was able to reduce on his own. Pain in left knee, injured in the past.  No recent falls. Uses cane for ambulation.  No knee x ray performed.  Continues to follow with podiatry.       Patient Care Team:  FREIDA Keyes as PCP - General (Internal Medicine)  Kay Del Real MD as PCP - PCP-[facility] ([facility])  Kay Del Real MD as PCP - PCP-[facility] ([facility])  Lalo Bernstein DPM (Podiatry)    Review of Systems   Constitutional: Negative.  Negative for fatigue.   HENT: Negative.  Negative for congestion, postnasal drip, rhinorrhea and trouble swallowing.    Eyes: Negative.  Negative for visual disturbance.   Respiratory: Negative.  Negative for choking and shortness of breath.    Cardiovascular: Negative.  Negative for chest pain.   Gastrointestinal: Negative.    Endocrine: Negative.    Genitourinary: Negative.    Musculoskeletal:  Positive for arthralgias and joint swelling (Left knee). Negative for back pain, myalgias and neck pain.   Skin: Negative.    Neurological:  Negative for dizziness and headaches.   Psychiatric/Behavioral: Negative.       Medical History Reviewed by provider this encounter:  Tobacco  Allergies  Meds  Problems  Med Hx  Surg Hx  Fam Hx       Annual Wellness Visit Questionnaire   Krzysztof is here for his Subsequent Wellness visit. Last Medicare Wellness visit information reviewed, patient interviewed, no change since last AWV.     Health Risk Assessment:   Patient rates overall health as  fair. Patient feels that their physical health rating is same. Patient is satisfied with their life. Eyesight was rated as same. Hearing was rated as same. Patient feels that their emotional and mental health rating is same. Patients states they are never, rarely angry. Patient states they are sometimes unusually tired/fatigued. Pain experienced in the last 7 days has been a lot. Patient's pain rating has been 9/10. Patient states that he has experienced no weight loss or gain in last 6 months.     Depression Screening:   PHQ-2 Score: 0      Fall Risk Screening:   In the past year, patient has experienced: history of falling in past year    Number of falls: 1    Home Safety:  Patient has trouble with stairs inside or outside of their home. Patient has working smoke alarms and has working carbon monoxide detector. Home safety hazards include: none.     Nutrition:   Current diet is Regular.     Medications:   Patient is not currently taking any over-the-counter supplements. Patient is able to manage medications.     Activities of Daily Living (ADLs)/Instrumental Activities of Daily Living (IADLs):   Walk and transfer into and out of bed and chair?: Yes  Dress and groom yourself?: Yes    Bathe or shower yourself?: Yes    Feed yourself? Yes  Do your laundry/housekeeping?: Yes  Manage your money, pay your bills and track your expenses?: Yes  Make your own meals?: Yes    Do your own shopping?: Yes    Previous Hospitalizations:   Any hospitalizations or ED visits within the last 12 months?: Yes    How many hospitalizations have you had in the last year?: 1-2    Hospitalization Comments: Fall at home     Advance Care Planning:   Living will: No    Durable POA for healthcare: No    Advanced directive: No    ACP document given: Yes      Cognitive Screening:   Provider or family/friend/caregiver concerned regarding cognition?: No    PREVENTIVE SCREENINGS      Cardiovascular Screening:      Due for: Lipid Panel      Diabetes  "Screening:     General: Screening Current      Colorectal Cancer Screening:       Due for: FOBT/FIT      Prostate Cancer Screening:    General: Screening Not Indicated      Osteoporosis Screening:    General: Screening Not Indicated      Abdominal Aortic Aneurysm (AAA) Screening:    Risk factors include: age between 65-76 yo and tobacco use        General: Screening Not Indicated      Lung Cancer Screening:     General: Screening Not Indicated      Hepatitis C Screening:    General: Screening Not Indicated    Screening, Brief Intervention, and Referral to Treatment (SBIRT)    Screening  Typical number of drinks in a day: 0  Typical number of drinks in a week: 0  Interpretation: Low risk drinking behavior.    Single Item Drug Screening:  How often have you used an illegal drug (including marijuana) or a prescription medication for non-medical reasons in the past year? never    Single Item Drug Screen Score: 0  Interpretation: Negative screen for possible drug use disorder    Social Drivers of Health     Food Insecurity: No Food Insecurity (2/4/2025)    Hunger Vital Sign     Worried About Running Out of Food in the Last Year: Never true     Ran Out of Food in the Last Year: Never true   Transportation Needs: No Transportation Needs (2/4/2025)    PRAPARE - Transportation     Lack of Transportation (Medical): No     Lack of Transportation (Non-Medical): No   Housing Stability: Unknown (2/4/2025)    Housing Stability Vital Sign     Unable to Pay for Housing in the Last Year: No   Utilities: Not At Risk (2/4/2025)    [facility] Utilities     Threatened with loss of utilities: No     No results found.    Objective   /72   Pulse 72   Temp 97.6 °F (36.4 °C) (Temporal)   Resp 18   Ht 5' 10\" (1.778 m)   Wt 82 kg (180 lb 12.8 oz)   SpO2 97%   BMI 25.94 kg/m²     Physical Exam  Vitals reviewed.   Constitutional:       Appearance: Normal appearance.   HENT:      Head: Normocephalic and atraumatic.      Nose: Nose normal.    "   Mouth/Throat:      Mouth: Mucous membranes are moist.   Eyes:      Extraocular Movements: Extraocular movements intact.      Pupils: Pupils are equal, round, and reactive to light.   Cardiovascular:      Rate and Rhythm: Normal rate and regular rhythm.      Pulses: Normal pulses.      Heart sounds: Normal heart sounds.   Pulmonary:      Effort: Pulmonary effort is normal.      Breath sounds: Normal breath sounds.   Musculoskeletal:         General: Normal range of motion.   Skin:     General: Skin is warm.   Neurological:      General: No focal deficit present.      Mental Status: He is alert and oriented to person, place, and time. Mental status is at baseline.   Psychiatric:         Mood and Affect: Mood normal.         Behavior: Behavior normal.         Thought Content: Thought content normal.         Judgment: Judgment normal.

## 2025-02-04 NOTE — ASSESSMENT & PLAN NOTE
Patient with umbilical hernia.  He is able to reduce this on his own.  No pain.  No problems with his bowels.  Will continue to monitor.

## 2025-03-26 ENCOUNTER — VBI (OUTPATIENT)
Dept: ADMINISTRATIVE | Facility: OTHER | Age: 75
End: 2025-03-26

## 2025-03-26 NOTE — TELEPHONE ENCOUNTER
03/26/25 8:22 AM     Chart reviewed for CRC: Colonoscopy was/were not submitted to the patient's insurance.     Maura Fierro MA   [facility]